# Patient Record
Sex: MALE | Race: OTHER | NOT HISPANIC OR LATINO | URBAN - METROPOLITAN AREA
[De-identification: names, ages, dates, MRNs, and addresses within clinical notes are randomized per-mention and may not be internally consistent; named-entity substitution may affect disease eponyms.]

---

## 2021-03-08 ENCOUNTER — INPATIENT (INPATIENT)
Facility: HOSPITAL | Age: 66
LOS: 8 days | Discharge: HOME | DRG: 234 | End: 2021-03-17
Attending: THORACIC SURGERY (CARDIOTHORACIC VASCULAR SURGERY) | Admitting: INTERNAL MEDICINE
Payer: COMMERCIAL

## 2021-03-08 VITALS
RESPIRATION RATE: 18 BRPM | OXYGEN SATURATION: 98 % | DIASTOLIC BLOOD PRESSURE: 83 MMHG | HEIGHT: 69 IN | SYSTOLIC BLOOD PRESSURE: 172 MMHG | TEMPERATURE: 98 F | WEIGHT: 184.97 LBS | HEART RATE: 81 BPM

## 2021-03-08 LAB
ALBUMIN SERPL ELPH-MCNC: 4.5 G/DL — SIGNIFICANT CHANGE UP (ref 3.3–5)
ALP SERPL-CCNC: 125 U/L — HIGH (ref 40–120)
ALT FLD-CCNC: 32 U/L — SIGNIFICANT CHANGE UP (ref 10–45)
ANION GAP SERPL CALC-SCNC: 12 MMOL/L — SIGNIFICANT CHANGE UP (ref 5–17)
APTT BLD: 31 SEC — SIGNIFICANT CHANGE UP (ref 27.5–35.5)
AST SERPL-CCNC: 22 U/L — SIGNIFICANT CHANGE UP (ref 10–40)
BASOPHILS # BLD AUTO: 0.06 K/UL — SIGNIFICANT CHANGE UP (ref 0–0.2)
BASOPHILS NFR BLD AUTO: 0.5 % — SIGNIFICANT CHANGE UP (ref 0–2)
BILIRUB SERPL-MCNC: 0.3 MG/DL — SIGNIFICANT CHANGE UP (ref 0.2–1.2)
BUN SERPL-MCNC: 12 MG/DL — SIGNIFICANT CHANGE UP (ref 7–23)
CALCIUM SERPL-MCNC: 10.2 MG/DL — SIGNIFICANT CHANGE UP (ref 8.4–10.5)
CHLORIDE SERPL-SCNC: 102 MMOL/L — SIGNIFICANT CHANGE UP (ref 96–108)
CO2 SERPL-SCNC: 27 MMOL/L — SIGNIFICANT CHANGE UP (ref 22–31)
CREAT SERPL-MCNC: 0.82 MG/DL — SIGNIFICANT CHANGE UP (ref 0.5–1.3)
EOSINOPHIL # BLD AUTO: 0.42 K/UL — SIGNIFICANT CHANGE UP (ref 0–0.5)
EOSINOPHIL NFR BLD AUTO: 3.5 % — SIGNIFICANT CHANGE UP (ref 0–6)
GLUCOSE SERPL-MCNC: 219 MG/DL — HIGH (ref 70–99)
HCT VFR BLD CALC: 46.8 % — SIGNIFICANT CHANGE UP (ref 39–50)
HGB BLD-MCNC: 15.1 G/DL — SIGNIFICANT CHANGE UP (ref 13–17)
IMM GRANULOCYTES NFR BLD AUTO: 0.4 % — SIGNIFICANT CHANGE UP (ref 0–1.5)
INR BLD: 0.93 RATIO — SIGNIFICANT CHANGE UP (ref 0.88–1.16)
LYMPHOCYTES # BLD AUTO: 35.4 % — SIGNIFICANT CHANGE UP (ref 13–44)
LYMPHOCYTES # BLD AUTO: 4.25 K/UL — HIGH (ref 1–3.3)
MAGNESIUM SERPL-MCNC: 2.3 MG/DL — SIGNIFICANT CHANGE UP (ref 1.6–2.6)
MCHC RBC-ENTMCNC: 27.3 PG — SIGNIFICANT CHANGE UP (ref 27–34)
MCHC RBC-ENTMCNC: 32.3 GM/DL — SIGNIFICANT CHANGE UP (ref 32–36)
MCV RBC AUTO: 84.6 FL — SIGNIFICANT CHANGE UP (ref 80–100)
MONOCYTES # BLD AUTO: 0.66 K/UL — SIGNIFICANT CHANGE UP (ref 0–0.9)
MONOCYTES NFR BLD AUTO: 5.5 % — SIGNIFICANT CHANGE UP (ref 2–14)
NEUTROPHILS # BLD AUTO: 6.55 K/UL — SIGNIFICANT CHANGE UP (ref 1.8–7.4)
NEUTROPHILS NFR BLD AUTO: 54.7 % — SIGNIFICANT CHANGE UP (ref 43–77)
NRBC # BLD: 0 /100 WBCS — SIGNIFICANT CHANGE UP (ref 0–0)
NT-PROBNP SERPL-SCNC: 150 PG/ML — SIGNIFICANT CHANGE UP (ref 0–300)
PHOSPHATE SERPL-MCNC: 3.5 MG/DL — SIGNIFICANT CHANGE UP (ref 2.5–4.5)
PLATELET # BLD AUTO: 279 K/UL — SIGNIFICANT CHANGE UP (ref 150–400)
POTASSIUM SERPL-MCNC: 4.5 MMOL/L — SIGNIFICANT CHANGE UP (ref 3.5–5.3)
POTASSIUM SERPL-SCNC: 4.5 MMOL/L — SIGNIFICANT CHANGE UP (ref 3.5–5.3)
PROT SERPL-MCNC: 7.7 G/DL — SIGNIFICANT CHANGE UP (ref 6–8.3)
PROTHROM AB SERPL-ACNC: 11.2 SEC — SIGNIFICANT CHANGE UP (ref 10.6–13.6)
RBC # BLD: 5.53 M/UL — SIGNIFICANT CHANGE UP (ref 4.2–5.8)
RBC # FLD: 13.3 % — SIGNIFICANT CHANGE UP (ref 10.3–14.5)
SODIUM SERPL-SCNC: 141 MMOL/L — SIGNIFICANT CHANGE UP (ref 135–145)
TROPONIN T, HIGH SENSITIVITY RESULT: 36 NG/L — SIGNIFICANT CHANGE UP (ref 0–51)
WBC # BLD: 11.99 K/UL — HIGH (ref 3.8–10.5)
WBC # FLD AUTO: 11.99 K/UL — HIGH (ref 3.8–10.5)

## 2021-03-08 PROCEDURE — 71045 X-RAY EXAM CHEST 1 VIEW: CPT | Mod: 26

## 2021-03-08 PROCEDURE — 99291 CRITICAL CARE FIRST HOUR: CPT

## 2021-03-08 PROCEDURE — 93010 ELECTROCARDIOGRAM REPORT: CPT

## 2021-03-08 RX ORDER — HEPARIN SODIUM 5000 [USP'U]/ML
5000 INJECTION INTRAVENOUS; SUBCUTANEOUS EVERY 6 HOURS
Refills: 0 | Status: DISCONTINUED | OUTPATIENT
Start: 2021-03-08 | End: 2021-03-09

## 2021-03-08 RX ORDER — TICAGRELOR 90 MG/1
180 TABLET ORAL ONCE
Refills: 0 | Status: COMPLETED | OUTPATIENT
Start: 2021-03-08 | End: 2021-03-08

## 2021-03-08 RX ORDER — FENTANYL CITRATE 50 UG/ML
50 INJECTION INTRAVENOUS ONCE
Refills: 0 | Status: DISCONTINUED | OUTPATIENT
Start: 2021-03-08 | End: 2021-03-08

## 2021-03-08 RX ORDER — NITROGLYCERIN 6.5 MG
0.4 CAPSULE, EXTENDED RELEASE ORAL ONCE
Refills: 0 | Status: COMPLETED | OUTPATIENT
Start: 2021-03-08 | End: 2021-03-08

## 2021-03-08 RX ORDER — HEPARIN SODIUM 5000 [USP'U]/ML
5000 INJECTION INTRAVENOUS; SUBCUTANEOUS ONCE
Refills: 0 | Status: COMPLETED | OUTPATIENT
Start: 2021-03-08 | End: 2021-03-08

## 2021-03-08 RX ORDER — ASPIRIN/CALCIUM CARB/MAGNESIUM 324 MG
243 TABLET ORAL ONCE
Refills: 0 | Status: COMPLETED | OUTPATIENT
Start: 2021-03-08 | End: 2021-03-08

## 2021-03-08 RX ORDER — HEPARIN SODIUM 5000 [USP'U]/ML
INJECTION INTRAVENOUS; SUBCUTANEOUS
Qty: 25000 | Refills: 0 | Status: DISCONTINUED | OUTPATIENT
Start: 2021-03-08 | End: 2021-03-09

## 2021-03-08 RX ADMIN — Medication 243 MILLIGRAM(S): at 22:50

## 2021-03-08 RX ADMIN — HEPARIN SODIUM 1000 UNIT(S)/HR: 5000 INJECTION INTRAVENOUS; SUBCUTANEOUS at 23:07

## 2021-03-08 RX ADMIN — HEPARIN SODIUM 5000 UNIT(S): 5000 INJECTION INTRAVENOUS; SUBCUTANEOUS at 23:07

## 2021-03-08 RX ADMIN — TICAGRELOR 180 MILLIGRAM(S): 90 TABLET ORAL at 23:07

## 2021-03-08 RX ADMIN — Medication 0.4 MILLIGRAM(S): at 22:50

## 2021-03-08 NOTE — CONSULT NOTE ADULT - SUBJECTIVE AND OBJECTIVE BOX
Patient seen and evaluated at bedside    Chief Complaint: chest pain    HPI: Patient is a 65y M w/ PMH HTN, HLD, DM, hypothyroidismm, chronic pancreatitis presenting w/ CP x 2wks. CP transient, assoc w/ exertion, described as a burning sensation, radiating to the jaw occasionally, assoc w/ occasional dyspnea. Pt went to PCP, on TTE, found w/ inferolateral hypokinesis and referred to ED. Denies f/c/n/v/d/c, dysuria, orthopnea, LE edema, palpitations, presyncope, syncope, sick contacts.    In the ED, VS T: 98, P: 81, BP: 172/83, RR: 18, O2: 98%RA. Patient currently reporting CP, denying dyspnea, palpitations, presyncope, syncope, HA, ab pain.    PMHx:   Chronic pancreatitis    Hypothyroidism    HLD (hyperlipidemia)    HTN (hypertension)    DM (diabetes mellitus)      PSHx:   No significant past surgical history      FAMILY HISTORY:    Allergies:  morphine (Urticaria)    Home Medications:    Current Medications:   heparin   Injectable 5000 Unit(s) IV Push once  heparin   Injectable 5000 Unit(s) IV Push every 6 hours PRN  heparin  Infusion.  Unit(s)/Hr IV Continuous <Continuous>  ticagrelor 180 milliGRAM(s) Oral Once    Social History  Smoking History: denies  Alcohol Use: denies  Drug Use: denies    REVIEW OF SYSTEMS:  Constitutional:     [X] negative [ ] fevers [ ] chills [ ] weight loss [ ] weight gain  HEENT:                  [X] negative [ ] dry eyes [ ] eye irritation [ ] postnasal drip [ ] nasal congestion  CV:                         [X] negative  [ ] chest pain [ ] orthopnea [ ] palpitations [ ] murmur  Resp:                     [X] negative [ ] cough [ ] shortness of breath [ ] dyspnea [ ] wheezing [ ] sputum [ ] hemoptysis  GI:                          [X] negative [ ] nausea [ ] vomiting [ ] diarrhea [ ] constipation [ ] abd pain [ ] dysphagia   :                        [X] negative [ ] dysuria [ ] nocturia [ ] hematuria [ ] increased urinary frequency  MSK:                      [X] negative [ ] back pain [ ] myalgias [ ] arthralgias [ ] fracture  Skin:                       [X] negative [ ] rash [ ] itch  Neuro:                   [X] negative [ ] headache [ ] dizziness [ ] syncope [ ] weakness [ ] numbness  Psych:                    [X] negative [ ] anxiety [ ] depression  Endo:                     [X] negative [ ] diabetes [ ] thyroid problem  Heme/Lymph:      [X] negative [ ] anemia [ ] bleeding problem  Allergic/Immune: [X] negative [ ] itchy eyes [ ] nasal discharge [ ] hives [ ] angioedema    [X] All other systems negative or otherwise described above.  [ ] Unable to assess ROS because ________.    ICU Vital Signs Last 24 Hrs  T(C): 36.4 (08 Mar 2021 21:01), Max: 36.7 (08 Mar 2021 19:11)  T(F): 97.5 (08 Mar 2021 21:01), Max: 98 (08 Mar 2021 19:11)  HR: 75 (08 Mar 2021 22:53) (75 - 81)  BP: 135/64 (08 Mar 2021 22:53) (135/64 - 172/83)  BP(mean): --  ABP: --  ABP(mean): --  RR: 17 (08 Mar 2021 22:53) (17 - 18)  SpO2: 100% (08 Mar 2021 22:53) (98% - 100%)    Daily Height in cm: 175.26 (08 Mar 2021 19:11)    Daily     Physical Exam:  GENERAL: No acute distress, well-developed  HEAD:  Atraumatic, Normocephalic  ENT: EOMI, conjunctiva and sclera clear, Neck supple, No JVD, moist mucosa  CHEST/LUNG: Clear to auscultation bilaterally; No wheeze, equal breath sounds bilaterally   BACK: No spinal tenderness  HEART: Regular rate and rhythm; No murmurs, rubs, or gallops, radial and DP 2+ b/l, euvolemic  ABDOMEN: Soft, Nontender, Nondistended  EXTREMITIES:  No clubbing, cyanosis, or edema  PSYCH: Nl behavior, nl affect  NEUROLOGY: AAOx3, non-focal  SKIN: Normal color, No rashes or lesions  LINES: no central lines present    Cardiovascular Diagnostic Testing    ECG: Personally reviewed    Echo: Personally reviewed    Stress Testing: none    Cath: none    Imaging: none    CXR: Personally reviewed    Labs: Personally reviewed                        15.1   11.99 )-----------( 279      ( 08 Mar 2021 21:31 )             46.8     03-08    141  |  102  |  12  ----------------------------<  219<H>  4.5   |  27  |  0.82    Ca    10.2      08 Mar 2021 21:31  Phos  3.5     03-08  Mg     2.3     03-08    TPro  7.7  /  Alb  4.5  /  TBili  0.3  /  DBili  x   /  AST  22  /  ALT  32  /  AlkPhos  125<H>  03-08    PT/INR - ( 08 Mar 2021 21:31 )   PT: 11.2 sec;   INR: 0.93 ratio         PTT - ( 08 Mar 2021 21:31 )  PTT:31.0 sec  Serum Pro-Brain Natriuretic Peptide: 150 pg/mL (03-08 @ 21:31)         Patient seen and evaluated at bedside    Chief Complaint: chest pain    HPI: Patient is a 65y M w/ PMH HTN, HLD, DM, hypothyroidismm, chronic pancreatitis presenting w/ CP x 2wks. CP transient, assoc w/ exertion, described as a burning sensation, radiating to the jaw occasionally, assoc w/ occasional dyspnea. Pt went to PCP, on TTE, found w/ inferolateral hypokinesis and referred to ED. Denies f/c/n/v/d/c, dysuria, orthopnea, LE edema, palpitations, presyncope, syncope, sick contacts.    In the ED, VS T: 98, P: 81, BP: 172/83, RR: 18, O2: 98%RA. Patient currently reporting CP, ab pain, denying dyspnea, palpitations, presyncope, syncope, HA.    PMHx:   Chronic pancreatitis    Hypothyroidism    HLD (hyperlipidemia)    HTN (hypertension)    DM (diabetes mellitus)      PSHx:   No significant past surgical history      FAMILY HISTORY: noncontributory    Allergies:  morphine (Urticaria)    Home Medications: unable to report    Current Medications:   heparin   Injectable 5000 Unit(s) IV Push once  heparin   Injectable 5000 Unit(s) IV Push every 6 hours PRN  heparin  Infusion.  Unit(s)/Hr IV Continuous <Continuous>  ticagrelor 180 milliGRAM(s) Oral Once    Social History  Smoking History: former  Alcohol Use: denies  Drug Use: denies    REVIEW OF SYSTEMS:  Constitutional:     [X] negative [ ] fevers [ ] chills [ ] weight loss [ ] weight gain  HEENT:                  [X] negative [ ] dry eyes [ ] eye irritation [ ] postnasal drip [ ] nasal congestion  CV:                         [ ] negative  [X ] chest pain [ ] orthopnea [ ] palpitations [ ] murmur  Resp:                     [ ] negative [ ] cough [X] shortness of breath [ ] wheezing [ ] sputum [ ] hemoptysis  GI:                          [X] negative [ ] nausea [ ] vomiting [ ] diarrhea [ ] constipation [ ] abd pain [ ] dysphagia   :                        [X] negative [ ] dysuria [ ] nocturia [ ] hematuria [ ] increased urinary frequency  MSK:                      [X] negative [ ] back pain [ ] myalgias [ ] arthralgias [ ] fracture  Skin:                       [X] negative [ ] rash [ ] itch  Neuro:                   [X] negative [ ] headache [ ] dizziness [ ] syncope [ ] weakness [ ] numbness  Psych:                    [X] negative [ ] anxiety [ ] depression  Endo:                     [X] negative [ ] diabetes [ ] thyroid problem  Heme/Lymph:      [X] negative [ ] anemia [ ] bleeding problem  Allergic/Immune: [X] negative [ ] itchy eyes [ ] nasal discharge [ ] hives [ ] angioedema    [X] All other systems negative or otherwise described above.  [ ] Unable to assess ROS because ________.    ICU Vital Signs Last 24 Hrs  T(C): 36.4 (08 Mar 2021 21:01), Max: 36.7 (08 Mar 2021 19:11)  T(F): 97.5 (08 Mar 2021 21:01), Max: 98 (08 Mar 2021 19:11)  HR: 75 (08 Mar 2021 22:53) (75 - 81)  BP: 135/64 (08 Mar 2021 22:53) (135/64 - 172/83)  BP(mean): --  ABP: --  ABP(mean): --  RR: 17 (08 Mar 2021 22:53) (17 - 18)  SpO2: 100% (08 Mar 2021 22:53) (98% - 100%)    Daily Height in cm: 175.26 (08 Mar 2021 19:11)    Daily     Physical Exam:  GENERAL: No acute distress, well-developed  HEAD:  Atraumatic, Normocephalic  ENT: EOMI, conjunctiva and sclera clear, Neck supple, No JVD, moist mucosa  CHEST/LUNG: Clear to auscultation bilaterally; No wheeze, equal breath sounds bilaterally   BACK: No spinal tenderness  HEART: Regular rate and rhythm; No murmurs, rubs, or gallops, radial and DP 2+ b/l, euvolemic  ABDOMEN: TTP, distended  EXTREMITIES:  No clubbing, cyanosis, or edema  PSYCH: Nl behavior, nl affect  NEUROLOGY: AAOx3, non-focal  SKIN: Normal color, No rashes or lesions  LINES: no central lines present    Cardiovascular Diagnostic Testing    ECG: Personally reviewed; inferior TWI, TW flattening in V5-V6    Echo: none    Stress Testing: none    Cath: none    Imaging: none    CXR: Personally reviewed    Labs: Personally reviewed                        15.1   11.99 )-----------( 279      ( 08 Mar 2021 21:31 )             46.8     03-08    141  |  102  |  12  ----------------------------<  219<H>  4.5   |  27  |  0.82    Ca    10.2      08 Mar 2021 21:31  Phos  3.5     03-08  Mg     2.3     03-08    TPro  7.7  /  Alb  4.5  /  TBili  0.3  /  DBili  x   /  AST  22  /  ALT  32  /  AlkPhos  125<H>  03-08    PT/INR - ( 08 Mar 2021 21:31 )   PT: 11.2 sec;   INR: 0.93 ratio         PTT - ( 08 Mar 2021 21:31 )  PTT:31.0 sec  Serum Pro-Brain Natriuretic Peptide: 150 pg/mL (03-08 @ 21:31)    Troponin T, High Sensitivity (03.08.21 @ 21:31)    Troponin T, High Sensitivity Result: 36: Specimen not hemolyzed  *  *  Rapid upward or downward changes in high-sensitivity troponin levels  suggest acute myocardial injury. Renal impairment may cause sustained  troponin elevations.  Normal: <6 - 14 ng/L  Indeterminate: 15-51 ng/L  Elevated: > 51 ng/L  See [email or web address]/test/[email or web address] on the [facility] intranet for more  information ng/L

## 2021-03-08 NOTE — ED PROVIDER NOTE - ST/T WAVE
TWI in III, aVF, TW flat in V5, V6 TWI in III and aVF, TW upright in V5-V6 pseudonormalization T waves laterally

## 2021-03-08 NOTE — ED PROVIDER NOTE - PROGRESS NOTE DETAILS
Candie Robbins MD: pseudonormalization of T waves in V5 to V6 in setting of persistent CP currently. Cardiology consulted. Will give asa, nitro, start heparin. Attending MD Llanos: cardiology fellow has evaluated patient, recommends DAPT loading with aspirin (given) and ticagrelor (ordered). Heparin gtt infusing. Will follow pt's response to medical therapies for now.

## 2021-03-08 NOTE — ED PROVIDER NOTE - RAPID ASSESSMENT
65y M c/o CP, SOB x 1 week that got worse today. He saw his cardiologist today and was told that he has a blockage and to go to the ED. On ASA. No Plavix or Brilinta. Denies any h/o heart disease.   Cardiologist: Gideon Marx (Scribe), have documented this rapid assessment note under the dictation of Dr. Alex Valencia, which has been reviewed and affirmed to be accurate.  Patient was seen as a tele QDOC patient. The patient will be seen and further worked up in the main emergency department and their care will be completed by the main emergency department team along with a thorough physical exam. Receiving team will follow up on labs, analgesia, any clinical imaging, reassess and disposition as clinically indicated, all decisions regarding the progression of care will be made at their discretion.    Scribe Statement: Gideon SHAIKH, attest that this documentation has been prepared under the direction and in the presence of Alex Preciado (MD) 65y M c/o CP, SOB x 1 week that got worse today. He saw his cardiologist today and was told that he has a blockage and to go to the ED. On ASA. No Plavix or Brilinta. Denies any h/o heart disease.   Cardiologist: Dr. Hitesh Rodriguez I, Gideon Gonzalez (Scribe), have documented this rapid assessment note under the dictation of Dr. Alex Valencia, which has been reviewed and affirmed to be accurate.  Patient was seen as a tele QDOC patient. The patient will be seen and further worked up in the main emergency department and their care will be completed by the main emergency department team along with a thorough physical exam. Receiving team will follow up on labs, analgesia, any clinical imaging, reassess and disposition as clinically indicated, all decisions regarding the progression of care will be made at their discretion.    Scribe Statement: I, Gideon Gonzalez, attest that this documentation has been prepared under the direction and in the presence of Alex Preciado (MD)    RENATE Valencia: I saw this patient as quick assessment doc, I did not perform a physical examination. I may have ordered labs/imaging/low acuity meds with the understanding that the patient is to be fully assessed in the main area of the emergency department by a certified emergency medicine provider. A scribe performed the documentation for me.

## 2021-03-08 NOTE — ED PROVIDER NOTE - NS ED ROS FT
General: denies fever, chills  HENT: denies nasal congestion, sore throat, rhinorrhea  Eyes: denies vision changes  CV: +chest pain  Resp: + difficulty breathing, denies cough  Abdominal: denies nausea, vomiting, diarrhea, abdominal pain, blood in stool, dark stool  : denies pain with urination  MSK: denies recent trauma  Neuro: denies headaches, numbness, tingling, dizziness, lightheadedness.  Skin: denies new rashes  Endocrine: denies recent weight loss

## 2021-03-08 NOTE — ED ADULT NURSE NOTE - OBJECTIVE STATEMENT
65 year old Male, PMH: DM,. HTN, HLD, hypothyroid, chronic pancreatitis. Patient comes in from home for x2 weeks of chest pain. Patient reports dyspnea on exertion, but more recently sob and chest pain while sitting down watching tv. Pain is described as burning radiating up to throat regardless of PO intake. Patient reports 7/10 pain upon arrival. A&Ox4, breathing spontaneous and unlabored, ambulates independently, sinus rhythm on cardiac monitor, well appearing, palpable pulses, skin color normal for ethnicity, no swelling in bilateral lower extremities. Denies sob at this time, fever, chills, nausea, vomiting, diarrhea. Bed locked and in lowest position for safety, call bell at bedside.

## 2021-03-08 NOTE — ED PROVIDER NOTE - PMH
Chronic pancreatitis    DM (diabetes mellitus)    HLD (hyperlipidemia)    HTN (hypertension)    Hypothyroidism

## 2021-03-08 NOTE — CONSULT NOTE ADULT - ASSESSMENT
Patient is a 65y M w/ PMH HTN, HLD, DM, hypothyroidismm, chronic pancreatitis presenting w/ CP x 2wks. Found w/ inferolateral hypokinesis by PCP, concern for ACS in the setting of ongoing CP.    Plan  - Admit to tele  - SLN prn for CP for now; will continue to reevaulate CP   - trend hsTrop to peak; can add on CKMB; repeat ECG   - c/w heparin gtt  - s/p ASA and brilinta load; c/w ASA 81mg qd, brilinta 90mg bid  - start atorvastatin 80mg qd, lopressor 25mg bid  - obtain lipid panel, TSH, a1c  - obtain TTE  - DASH/DM/low fat diet  - NPO for likely cath tomorrow  - ab pain present of exam; likely 2/2 chronic pancreatitis but may consider abdominal imaging. If indicated, caution w/ IV contrast as patient to likely undergo cath tomorrow    Mode Herrera MD  Cardiology Fellow - F1  Text or Call: [phone]  For all New Consults and Questions:  [email or web address]   Login: [email or web address]         Patient is a 65y M w/ PMH HTN, HLD, DM, hypothyroidismm, chronic pancreatitis presenting w/ CP x 2wks. Found w/ inferolateral hypokinesis by PCP, concern for ACS in the setting of ongoing CP.    Plan  - Admit to tele  - SLN prn for CP for now; will continue to reevaulate CP   - trend hsTrop to peak; can add on CKMB; repeat ECG   - c/w heparin gtt  - s/p ASA and brilinta load; c/w ASA 81mg qd, brilinta 90mg bid  - start atorvastatin 80mg qd, lopressor 25mg bid  - obtain lipid panel, TSH, a1c  - obtain TTE  - DASH/DM/low fat diet  - NPO for likely cath tomorrow  - ab pain present of exam; likely 2/2 chronic pancreatitis but may consider abdominal imaging. If indicated, caution w/ IV contrast as patient to likely undergo cath tomorrow  - discussed w/ interventionist; will continue to monitor CP, trops, and ECG    Mode Herrera MD  Cardiology Fellow - F1  Text or Call: [phone]  For all New Consults and Questions:  [email or web address]   Login: [email or web address]         Patient is a 65y M w/ PMH HTN, HLD, DM, hypothyroidismm, chronic pancreatitis presenting w/ CP x 2wks. Found w/ inferolateral hypokinesis by PCP, concern for ACS in the setting of ongoing CP.    Plan  - Admit to tele  - SLN prn for CP for now; will continue to reevaulate CP   - trend hsTrop to peak; can add on CKMB; repeat ECG   - c/w heparin gtt  - s/p ASA and brilinta load; c/w ASA 81mg qd, brilinta 90mg bid  - start atorvastatin 80mg qd, lopressor 25mg bid  - obtain lipid panel, TSH, a1c  - obtain TTE  - DASH/DM/low fat diet  - NPO for likely cath tomorrow  - ab pain present of exam; likely 2/2 chronic pancreatitis but may consider abdominal imaging. If indicated, caution w/ IV contrast as patient to likely undergo cath tomorrow  - discussed w/ interventionist; will continue to monitor CP, trops, and ECG  - MAIRA score: 72pts 1.6 % Probability of death from admission to 6 months     Mode Herrera MD  Cardiology Fellow - F1  Text or Call: [phone]  For all New Consults and Questions:  [email or web address]   Login: [email or web address]

## 2021-03-08 NOTE — ED PROVIDER NOTE - OBJECTIVE STATEMENT
Candie Robbins MD: Agree with rapid assessment above. Sent in by Dr. Rodriguez for planned cardiac cath tomorrow. Echo report shows inferior lateral hypokinesis, EKG with inferior lateral T wave changes. Pt states he is still having burning L sided chest pain with exertional dyspnea. Took 81mg asa today. No family hx of cardiac disease.

## 2021-03-08 NOTE — ED PROVIDER NOTE - CLINICAL SUMMARY MEDICAL DECISION MAKING FREE TEXT BOX
Attending MD Llanos:  65M with DM presenting with exertional chest burning x 2 weeks, paperwork with patient states new inferolateral TWI on outpatient ECG and TTE with inferolateral hypokinesis (presumed new). ECG from triage with no diagnostic ischemic changes but patient developed recurrent chest pain, will repeat ECG, load with aspirin, repeat ECG, admission for unstable angina    *The above represents an initial assessment/impression. Please refer to progress notes for potential changes in patient clinical course*

## 2021-03-08 NOTE — ED ADULT TRIAGE NOTE - CHIEF COMPLAINT QUOTE
as per grandmother , child was complaining of abdominal pain and has not been eating since yesterday
pt c/o "intermittent CP x 3-4 weeks"

## 2021-03-09 DIAGNOSIS — R09.89 OTHER SPECIFIED SYMPTOMS AND SIGNS INVOLVING THE CIRCULATORY AND RESPIRATORY SYSTEMS: ICD-10-CM

## 2021-03-09 DIAGNOSIS — I20.0 UNSTABLE ANGINA: ICD-10-CM

## 2021-03-09 DIAGNOSIS — E03.9 HYPOTHYROIDISM, UNSPECIFIED: ICD-10-CM

## 2021-03-09 DIAGNOSIS — E78.5 HYPERLIPIDEMIA, UNSPECIFIED: ICD-10-CM

## 2021-03-09 DIAGNOSIS — K86.1 OTHER CHRONIC PANCREATITIS: ICD-10-CM

## 2021-03-09 DIAGNOSIS — I10 ESSENTIAL (PRIMARY) HYPERTENSION: ICD-10-CM

## 2021-03-09 DIAGNOSIS — Z79.899 OTHER LONG TERM (CURRENT) DRUG THERAPY: ICD-10-CM

## 2021-03-09 DIAGNOSIS — E11.65 TYPE 2 DIABETES MELLITUS WITH HYPERGLYCEMIA: ICD-10-CM

## 2021-03-09 DIAGNOSIS — E11.9 TYPE 2 DIABETES MELLITUS WITHOUT COMPLICATIONS: ICD-10-CM

## 2021-03-09 LAB
A1C WITH ESTIMATED AVERAGE GLUCOSE RESULT: 8.2 % — HIGH (ref 4–5.6)
ANION GAP SERPL CALC-SCNC: 11 MMOL/L — SIGNIFICANT CHANGE UP (ref 5–17)
APTT BLD: 70.1 SEC — HIGH (ref 27.5–35.5)
BLD GP AB SCN SERPL QL: NEGATIVE — SIGNIFICANT CHANGE UP
BUN SERPL-MCNC: 12 MG/DL — SIGNIFICANT CHANGE UP (ref 7–23)
CALCIUM SERPL-MCNC: 9.9 MG/DL — SIGNIFICANT CHANGE UP (ref 8.4–10.5)
CHLORIDE SERPL-SCNC: 104 MMOL/L — SIGNIFICANT CHANGE UP (ref 96–108)
CHOLEST SERPL-MCNC: 111 MG/DL — SIGNIFICANT CHANGE UP
CK MB BLD-MCNC: 2 % — SIGNIFICANT CHANGE UP (ref 0–3.5)
CK MB BLD-MCNC: 2 % — SIGNIFICANT CHANGE UP (ref 0–3.5)
CK MB CFR SERPL CALC: 2.4 NG/ML — SIGNIFICANT CHANGE UP (ref 0–6.7)
CK MB CFR SERPL CALC: 2.5 NG/ML — SIGNIFICANT CHANGE UP (ref 0–6.7)
CK SERPL-CCNC: 118 U/L — SIGNIFICANT CHANGE UP (ref 30–200)
CK SERPL-CCNC: 127 U/L — SIGNIFICANT CHANGE UP (ref 30–200)
CO2 SERPL-SCNC: 28 MMOL/L — SIGNIFICANT CHANGE UP (ref 22–31)
CREAT SERPL-MCNC: 0.83 MG/DL — SIGNIFICANT CHANGE UP (ref 0.5–1.3)
ESTIMATED AVERAGE GLUCOSE: 189 MG/DL — HIGH (ref 68–114)
GLUCOSE BLDC GLUCOMTR-MCNC: 128 MG/DL — HIGH (ref 70–99)
GLUCOSE BLDC GLUCOMTR-MCNC: 154 MG/DL — HIGH (ref 70–99)
GLUCOSE BLDC GLUCOMTR-MCNC: 278 MG/DL — HIGH (ref 70–99)
GLUCOSE BLDC GLUCOMTR-MCNC: 315 MG/DL — HIGH (ref 70–99)
GLUCOSE BLDC GLUCOMTR-MCNC: 97 MG/DL — SIGNIFICANT CHANGE UP (ref 70–99)
GLUCOSE SERPL-MCNC: 78 MG/DL — SIGNIFICANT CHANGE UP (ref 70–99)
HCT VFR BLD CALC: 45.3 % — SIGNIFICANT CHANGE UP (ref 39–50)
HCV AB S/CO SERPL IA: 0.4 S/CO — SIGNIFICANT CHANGE UP (ref 0–0.99)
HCV AB SERPL-IMP: SIGNIFICANT CHANGE UP
HDLC SERPL-MCNC: 36 MG/DL — LOW
HGB BLD-MCNC: 14.5 G/DL — SIGNIFICANT CHANGE UP (ref 13–17)
LIPID PNL WITH DIRECT LDL SERPL: 55 MG/DL — SIGNIFICANT CHANGE UP
MCHC RBC-ENTMCNC: 27.1 PG — SIGNIFICANT CHANGE UP (ref 27–34)
MCHC RBC-ENTMCNC: 32 GM/DL — SIGNIFICANT CHANGE UP (ref 32–36)
MCV RBC AUTO: 84.5 FL — SIGNIFICANT CHANGE UP (ref 80–100)
NON HDL CHOLESTEROL: 75 MG/DL — SIGNIFICANT CHANGE UP
NRBC # BLD: 0 /100 WBCS — SIGNIFICANT CHANGE UP (ref 0–0)
PLATELET # BLD AUTO: 275 K/UL — SIGNIFICANT CHANGE UP (ref 150–400)
POTASSIUM SERPL-MCNC: 3.9 MMOL/L — SIGNIFICANT CHANGE UP (ref 3.5–5.3)
POTASSIUM SERPL-SCNC: 3.9 MMOL/L — SIGNIFICANT CHANGE UP (ref 3.5–5.3)
RBC # BLD: 5.36 M/UL — SIGNIFICANT CHANGE UP (ref 4.2–5.8)
RBC # FLD: 13.4 % — SIGNIFICANT CHANGE UP (ref 10.3–14.5)
RH IG SCN BLD-IMP: POSITIVE — SIGNIFICANT CHANGE UP
SARS-COV-2 IGG SERPL QL IA: NEGATIVE — SIGNIFICANT CHANGE UP
SARS-COV-2 IGM SERPL IA-ACNC: 0.07 INDEX — SIGNIFICANT CHANGE UP
SARS-COV-2 RNA SPEC QL NAA+PROBE: SIGNIFICANT CHANGE UP
SODIUM SERPL-SCNC: 143 MMOL/L — SIGNIFICANT CHANGE UP (ref 135–145)
TRIGL SERPL-MCNC: 101 MG/DL — SIGNIFICANT CHANGE UP
TROPONIN T, HIGH SENSITIVITY RESULT: 43 NG/L — SIGNIFICANT CHANGE UP (ref 0–51)
TROPONIN T, HIGH SENSITIVITY RESULT: 51 NG/L — SIGNIFICANT CHANGE UP (ref 0–51)
TSH SERPL-MCNC: 3.45 UIU/ML — SIGNIFICANT CHANGE UP (ref 0.27–4.2)
WBC # BLD: 11.3 K/UL — HIGH (ref 3.8–10.5)
WBC # FLD AUTO: 11.3 K/UL — HIGH (ref 3.8–10.5)

## 2021-03-09 PROCEDURE — 33967 INSERT I-AORT PERCUT DEVICE: CPT

## 2021-03-09 PROCEDURE — 93010 ELECTROCARDIOGRAM REPORT: CPT | Mod: 76

## 2021-03-09 PROCEDURE — 93458 L HRT ARTERY/VENTRICLE ANGIO: CPT | Mod: 26

## 2021-03-09 PROCEDURE — 71045 X-RAY EXAM CHEST 1 VIEW: CPT | Mod: 26

## 2021-03-09 PROCEDURE — 99255 IP/OBS CONSLTJ NEW/EST HI 80: CPT

## 2021-03-09 PROCEDURE — 99223 1ST HOSP IP/OBS HIGH 75: CPT

## 2021-03-09 PROCEDURE — 99152 MOD SED SAME PHYS/QHP 5/>YRS: CPT

## 2021-03-09 PROCEDURE — 99291 CRITICAL CARE FIRST HOUR: CPT

## 2021-03-09 RX ORDER — NITROGLYCERIN 6.5 MG
0.4 CAPSULE, EXTENDED RELEASE ORAL
Refills: 0 | Status: DISCONTINUED | OUTPATIENT
Start: 2021-03-09 | End: 2021-03-09

## 2021-03-09 RX ORDER — NITROGLYCERIN 6.5 MG
20 CAPSULE, EXTENDED RELEASE ORAL
Qty: 50 | Refills: 0 | Status: DISCONTINUED | OUTPATIENT
Start: 2021-03-09 | End: 2021-03-11

## 2021-03-09 RX ORDER — ASPIRIN/CALCIUM CARB/MAGNESIUM 324 MG
81 TABLET ORAL DAILY
Refills: 0 | Status: DISCONTINUED | OUTPATIENT
Start: 2021-03-09 | End: 2021-03-09

## 2021-03-09 RX ORDER — CHLORHEXIDINE GLUCONATE 213 G/1000ML
1 SOLUTION TOPICAL
Refills: 0 | Status: DISCONTINUED | OUTPATIENT
Start: 2021-03-09 | End: 2021-03-12

## 2021-03-09 RX ORDER — DEXTROSE 50 % IN WATER 50 %
25 SYRINGE (ML) INTRAVENOUS ONCE
Refills: 0 | Status: DISCONTINUED | OUTPATIENT
Start: 2021-03-09 | End: 2021-03-10

## 2021-03-09 RX ORDER — LEVOTHYROXINE SODIUM 125 MCG
2 TABLET ORAL
Qty: 0 | Refills: 0 | DISCHARGE

## 2021-03-09 RX ORDER — ACETAMINOPHEN 500 MG
1000 TABLET ORAL ONCE
Refills: 0 | Status: COMPLETED | OUTPATIENT
Start: 2021-03-09 | End: 2021-03-09

## 2021-03-09 RX ORDER — INSULIN NPH HUM/REG INSULIN HM 70-30/ML
60 VIAL (ML) SUBCUTANEOUS
Qty: 0 | Refills: 0 | DISCHARGE

## 2021-03-09 RX ORDER — DEXTROSE 50 % IN WATER 50 %
15 SYRINGE (ML) INTRAVENOUS ONCE
Refills: 0 | Status: DISCONTINUED | OUTPATIENT
Start: 2021-03-09 | End: 2021-03-10

## 2021-03-09 RX ORDER — HEPARIN SODIUM 5000 [USP'U]/ML
1000 INJECTION INTRAVENOUS; SUBCUTANEOUS
Qty: 25000 | Refills: 0 | Status: DISCONTINUED | OUTPATIENT
Start: 2021-03-09 | End: 2021-03-12

## 2021-03-09 RX ORDER — HEPARIN SODIUM 5000 [USP'U]/ML
INJECTION INTRAVENOUS; SUBCUTANEOUS
Qty: 25000 | Refills: 0 | Status: DISCONTINUED | OUTPATIENT
Start: 2021-03-09 | End: 2021-03-09

## 2021-03-09 RX ORDER — ACETAMINOPHEN 500 MG
650 TABLET ORAL ONCE
Refills: 0 | Status: DISCONTINUED | OUTPATIENT
Start: 2021-03-09 | End: 2021-03-09

## 2021-03-09 RX ORDER — DEXTROSE 50 % IN WATER 50 %
12.5 SYRINGE (ML) INTRAVENOUS ONCE
Refills: 0 | Status: DISCONTINUED | OUTPATIENT
Start: 2021-03-09 | End: 2021-03-10

## 2021-03-09 RX ORDER — ATORVASTATIN CALCIUM 80 MG/1
80 TABLET, FILM COATED ORAL AT BEDTIME
Refills: 0 | Status: DISCONTINUED | OUTPATIENT
Start: 2021-03-09 | End: 2021-03-12

## 2021-03-09 RX ORDER — LATANOPROST 0.05 MG/ML
1 SOLUTION/ DROPS OPHTHALMIC; TOPICAL AT BEDTIME
Refills: 0 | Status: DISCONTINUED | OUTPATIENT
Start: 2021-03-09 | End: 2021-03-12

## 2021-03-09 RX ORDER — LEVOTHYROXINE SODIUM 125 MCG
100 TABLET ORAL DAILY
Refills: 0 | Status: DISCONTINUED | OUTPATIENT
Start: 2021-03-09 | End: 2021-03-12

## 2021-03-09 RX ORDER — INSULIN LISPRO 100/ML
VIAL (ML) SUBCUTANEOUS
Refills: 0 | Status: DISCONTINUED | OUTPATIENT
Start: 2021-03-09 | End: 2021-03-10

## 2021-03-09 RX ORDER — GLUCAGON INJECTION, SOLUTION 0.5 MG/.1ML
1 INJECTION, SOLUTION SUBCUTANEOUS ONCE
Refills: 0 | Status: DISCONTINUED | OUTPATIENT
Start: 2021-03-09 | End: 2021-03-10

## 2021-03-09 RX ORDER — FENTANYL CITRATE 50 UG/ML
25 INJECTION INTRAVENOUS ONCE
Refills: 0 | Status: DISCONTINUED | OUTPATIENT
Start: 2021-03-09 | End: 2021-03-09

## 2021-03-09 RX ORDER — ACETAMINOPHEN WITH CODEINE 300MG-30MG
1 TABLET ORAL EVERY 4 HOURS
Refills: 0 | Status: DISCONTINUED | OUTPATIENT
Start: 2021-03-09 | End: 2021-03-09

## 2021-03-09 RX ORDER — INSULIN GLARGINE 100 [IU]/ML
40 INJECTION, SOLUTION SUBCUTANEOUS AT BEDTIME
Refills: 0 | Status: DISCONTINUED | OUTPATIENT
Start: 2021-03-09 | End: 2021-03-10

## 2021-03-09 RX ORDER — SODIUM CHLORIDE 9 MG/ML
1000 INJECTION, SOLUTION INTRAVENOUS
Refills: 0 | Status: DISCONTINUED | OUTPATIENT
Start: 2021-03-09 | End: 2021-03-10

## 2021-03-09 RX ORDER — ASPIRIN/CALCIUM CARB/MAGNESIUM 324 MG
81 TABLET ORAL DAILY
Refills: 0 | Status: DISCONTINUED | OUTPATIENT
Start: 2021-03-09 | End: 2021-03-12

## 2021-03-09 RX ORDER — LEVOTHYROXINE SODIUM 125 MCG
1 TABLET ORAL
Qty: 0 | Refills: 0 | DISCHARGE

## 2021-03-09 RX ORDER — INSULIN LISPRO 100/ML
10 VIAL (ML) SUBCUTANEOUS
Refills: 0 | Status: DISCONTINUED | OUTPATIENT
Start: 2021-03-09 | End: 2021-03-10

## 2021-03-09 RX ORDER — INSULIN LISPRO 100/ML
VIAL (ML) SUBCUTANEOUS AT BEDTIME
Refills: 0 | Status: DISCONTINUED | OUTPATIENT
Start: 2021-03-09 | End: 2021-03-10

## 2021-03-09 RX ORDER — LOSARTAN POTASSIUM 100 MG/1
100 TABLET, FILM COATED ORAL DAILY
Refills: 0 | Status: DISCONTINUED | OUTPATIENT
Start: 2021-03-09 | End: 2021-03-09

## 2021-03-09 RX ORDER — METOPROLOL TARTRATE 50 MG
25 TABLET ORAL
Refills: 0 | Status: DISCONTINUED | OUTPATIENT
Start: 2021-03-09 | End: 2021-03-10

## 2021-03-09 RX ORDER — TICAGRELOR 90 MG/1
90 TABLET ORAL EVERY 12 HOURS
Refills: 0 | Status: DISCONTINUED | OUTPATIENT
Start: 2021-03-09 | End: 2021-03-09

## 2021-03-09 RX ADMIN — Medication 25 MILLIGRAM(S): at 16:15

## 2021-03-09 RX ADMIN — Medication 400 MILLIGRAM(S): at 06:32

## 2021-03-09 RX ADMIN — INSULIN GLARGINE 40 UNIT(S): 100 INJECTION, SOLUTION SUBCUTANEOUS at 22:41

## 2021-03-09 RX ADMIN — LOSARTAN POTASSIUM 100 MILLIGRAM(S): 100 TABLET, FILM COATED ORAL at 17:18

## 2021-03-09 RX ADMIN — FENTANYL CITRATE 25 MICROGRAM(S): 50 INJECTION INTRAVENOUS at 02:22

## 2021-03-09 RX ADMIN — Medication 1 TABLET(S): at 16:14

## 2021-03-09 RX ADMIN — HEPARIN SODIUM 1000 UNIT(S)/HR: 5000 INJECTION INTRAVENOUS; SUBCUTANEOUS at 06:33

## 2021-03-09 RX ADMIN — Medication 4: at 22:43

## 2021-03-09 RX ADMIN — Medication 81 MILLIGRAM(S): at 08:04

## 2021-03-09 RX ADMIN — CHLORHEXIDINE GLUCONATE 1 APPLICATION(S): 213 SOLUTION TOPICAL at 22:44

## 2021-03-09 RX ADMIN — Medication 6: at 17:14

## 2021-03-09 RX ADMIN — ATORVASTATIN CALCIUM 80 MILLIGRAM(S): 80 TABLET, FILM COATED ORAL at 22:43

## 2021-03-09 RX ADMIN — Medication 25 MILLIGRAM(S): at 05:32

## 2021-03-09 RX ADMIN — Medication 0.4 MILLIGRAM(S): at 16:16

## 2021-03-09 RX ADMIN — HEPARIN SODIUM 1000 UNIT(S)/HR: 5000 INJECTION INTRAVENOUS; SUBCUTANEOUS at 20:01

## 2021-03-09 RX ADMIN — FENTANYL CITRATE 50 MICROGRAM(S): 50 INJECTION INTRAVENOUS at 00:03

## 2021-03-09 RX ADMIN — Medication 6 MICROGRAM(S)/MIN: at 20:01

## 2021-03-09 RX ADMIN — TICAGRELOR 90 MILLIGRAM(S): 90 TABLET ORAL at 05:32

## 2021-03-09 RX ADMIN — LATANOPROST 1 DROP(S): 0.05 SOLUTION/ DROPS OPHTHALMIC; TOPICAL at 22:45

## 2021-03-09 RX ADMIN — Medication 0.4 MILLIGRAM(S): at 05:32

## 2021-03-09 RX ADMIN — Medication 30 MILLILITER(S): at 00:03

## 2021-03-09 NOTE — H&P ADULT - PROBLEM SELECTOR PLAN 2
resume home medications   monitor routinely clarify home medications in AM  start lopressor 25mg BID for now   monitor routinely

## 2021-03-09 NOTE — H&P ADULT - PROBLEM SELECTOR PROBLEM 2
Medication is being filled for 1 time refill only due to:  Patient needs to be seen because needs follow up and phq-9.   Je Lagunas RN       Essential hypertension

## 2021-03-09 NOTE — H&P ADULT - NSICDXPASTMEDICALHX_GEN_ALL_CORE_FT
PAST MEDICAL HISTORY:  Chronic pancreatitis     DM (diabetes mellitus)     HLD (hyperlipidemia)     HTN (hypertension)     Hypothyroidism

## 2021-03-09 NOTE — H&P ADULT - NSHPPHYSICALEXAM_GEN_ALL_CORE
Vital Signs Last 24 Hrs  T(C): 36.4 (08 Mar 2021 21:01), Max: 36.7 (08 Mar 2021 19:11)  T(F): 97.5 (08 Mar 2021 21:01), Max: 98 (08 Mar 2021 19:11)  HR: 75 (08 Mar 2021 22:53) (75 - 81)  BP: 135/64 (08 Mar 2021 22:53) (135/64 - 172/83)  BP(mean): --  RR: 17 (08 Mar 2021 22:53) (17 - 18)  SpO2: 100% (08 Mar 2021 22:53) (98% - 100%)    PHYSICAL EXAM:  GENERAL: NAD, well-developed  HEAD:  Atraumatic, normocephalic  EYES: EOMI, PERRLA, conjunctiva and sclera clear  NECK: Supple, no JVD  CHEST/LUNG: Clear to auscultation bilaterally; no wheezing or rales  HEART: Regular rate and rhythm; no murmurs  ABDOMEN: Soft, nontender, nondistended; bowel sounds present  EXTREMITIES:  2+ Peripheral Pulses, no clubbing, cyanosis, or edema  PSYCH: calm affect, not anxious  NEUROLOGY: non-focal, AAOx3  SKIN: No rashes or lesions  MUSCULOSKELETAL: no back pain, moving all extremities Vital Signs Last 24 Hrs  T(C): 36.4 (08 Mar 2021 21:01), Max: 36.7 (08 Mar 2021 19:11)  T(F): 97.5 (08 Mar 2021 21:01), Max: 98 (08 Mar 2021 19:11)  HR: 75 (08 Mar 2021 22:53) (75 - 81)  BP: 135/64 (08 Mar 2021 22:53) (135/64 - 172/83)  BP(mean): --  RR: 17 (08 Mar 2021 22:53) (17 - 18)  SpO2: 100% (08 Mar 2021 22:53) (98% - 100%)    PHYSICAL EXAM:  GENERAL: NAD, well-developed  HEAD:  Atraumatic, normocephalic  EYES: EOMI, PERRLA, conjunctiva and sclera clear  NECK: Supple, no JVD  CHEST/LUNG: Clear to auscultation bilaterally; no wheezing or rales  HEART: Regular rate and rhythm; no murmurs  ABDOMEN: Soft, +epigastric tenderness, +midline surgical scar, nondistended; bowel sounds present  EXTREMITIES:  2+ Peripheral Pulses, no edema  PSYCH: calm affect, not anxious  NEUROLOGY: non-focal, AAOx3  SKIN: No rashes or lesions  MUSCULOSKELETAL: no back pain, moving all extremities

## 2021-03-09 NOTE — CONSULT NOTE ADULT - SUBJECTIVE AND OBJECTIVE BOX
CHIEF COMPLAINT: cp    HISTORY OF PRESENT ILLNESS:  65M with PMH of HTN, T2DM, HLD, hypothyroidism, chronic pancreatitis p/w chest pain. Pt states he has been having CP for past 2 weeks - pain was initially only with exertion, located midsternal/epigastric region, burning pain with occasional radiation to the jaw and associated with DUARTE. Pain would improve after about 5 minutes of rest. However, in past few days pt has started having pain even at rest. Denies any associated nausea/vomiting, diaphoresis, palpitations, syncope/lightheadedness. Pt went to see cardiologist for these symptoms and TTE showed inferolateral wall hypokinesis and referred to ED for possible cath. Pt had a normal stress test 2 years ago for pore-op for RLE arthrocentesis. ROS positive for chronic abd pain 2/2 chronic pancreatitis. Prior smoker, quit 1992.     Allergies    morphine (Urticaria)    Intolerances    	    MEDICATIONS:  aspirin  chewable 81 milliGRAM(s) Oral daily  heparin   Injectable 5000 Unit(s) IV Push every 6 hours PRN  heparin  Infusion.  Unit(s)/Hr IV Continuous <Continuous>  metoprolol tartrate 25 milliGRAM(s) Oral two times a day  nitroglycerin     SubLingual 0.4 milliGRAM(s) SubLingual every 5 minutes PRN  ticagrelor 90 milliGRAM(s) Oral every 12 hours  acetaminophen 300 mG/codeine 30 mG 1 Tablet(s) Oral every 4 hours PRN  atorvastatin 80 milliGRAM(s) Oral at bedtime  dextrose 40% Gel 15 Gram(s) Oral once  dextrose 50% Injectable 25 Gram(s) IV Push once  dextrose 50% Injectable 12.5 Gram(s) IV Push once  dextrose 50% Injectable 25 Gram(s) IV Push once  glucagon  Injectable 1 milliGRAM(s) IntraMuscular once  insulin glargine Injectable (LANTUS) 40 Unit(s) SubCutaneous at bedtime  insulin lispro (ADMELOG) corrective regimen sliding scale   SubCutaneous three times a day before meals  insulin lispro (ADMELOG) corrective regimen sliding scale   SubCutaneous at bedtime    dextrose 5%. 1000 milliLiter(s) IV Continuous <Continuous>  dextrose 5%. 1000 milliLiter(s) IV Continuous <Continuous>      PAST MEDICAL & SURGICAL HISTORY:  Chronic pancreatitis    Hypothyroidism    HLD (hyperlipidemia)    HTN (hypertension)    DM (diabetes mellitus)    No significant past surgical history        FAMILY HISTORY:  No pertinent family history in first degree relatives        SOCIAL HISTORY:    former smoker. quit 1992      REVIEW OF SYSTEMS:  See HPI, otherwise complete 10 point review of systems negative    [ ] All others negative	      PHYSICAL EXAM:  T(C): 36.6 (03-09-21 @ 05:35), Max: 36.7 (03-08-21 @ 19:11)  HR: 60 (03-09-21 @ 08:05) (60 - 81)  BP: 135/70 (03-09-21 @ 08:05) (135/64 - 172/83)  RR: 18 (03-09-21 @ 08:05) (17 - 20)  SpO2: 98% (03-09-21 @ 08:05) (97% - 100%)  Wt(kg): --  I&O's Summary      Appearance: No Acute Distress	  HEENT:  Normal oral mucosa, PERRL, EOMI	  Cardiovascular: Normal S1 S2, No JVD, No murmurs/rubs/gallops  Respiratory: Lungs clear to auscultation bilaterally  Gastrointestinal:  Soft, Non-tender, + BS	  Skin: No rashes, No ecchymoses, No cyanosis	  Neurologic: Non-focal  Extremities: No clubbing, cyanosis or edema  Vascular: Peripheral pulses palpable 2+ bilaterally  Psychiatry: A & O x 3, Mood & affect appropriate    Laboratory Data:	 	    CBC Full  -  ( 09 Mar 2021 05:18 )  WBC Count : 11.30 K/uL  Hemoglobin : 14.5 g/dL  Hematocrit : 45.3 %  Platelet Count - Automated : 275 K/uL  Mean Cell Volume : 84.5 fl  Mean Cell Hemoglobin : 27.1 pg  Mean Cell Hemoglobin Concentration : 32.0 gm/dL  Auto Neutrophil # : x  Auto Lymphocyte # : x  Auto Monocyte # : x  Auto Eosinophil # : x  Auto Basophil # : x  Auto Neutrophil % : x  Auto Lymphocyte % : x  Auto Monocyte % : x  Auto Eosinophil % : x  Auto Basophil % : x    03-09    143  |  104  |  12  ----------------------------<  78  3.9   |  28  |  0.83  03-08    141  |  102  |  12  ----------------------------<  219<H>  4.5   |  27  |  0.82    Ca    9.9      09 Mar 2021 05:15  Ca    10.2      08 Mar 2021 21:31  Phos  3.5     03-08  Mg     2.3     03-08    TPro  7.7  /  Alb  4.5  /  TBili  0.3  /  DBili  x   /  AST  22  /  ALT  32  /  AlkPhos  125<H>  03-08      proBNP: Serum Pro-Brain Natriuretic Peptide: 150 pg/mL (03-08 @ 21:31)        Interpretation of Telemetry: nsr 	    ECG:  	nsr inferolateral twi      Assessment:  -unstable angina  -htn  -hld  -dm  -chronic pancreatitis    Recs:  remains on asa, brilinta, statin and hep gtt for unstable angina. awaiting LHC with dr villagomez today. c/w sl nt prn. no role for gp2b3a inhibitor at present  beta blockers as tolerates  trend cardiac enzymes. serial ecgs  tele monitoring          Greater than 60 minutes spent on total encounter; more than 50% of the visit was spent counseling and/or coordinating care by the attending physician.   	  Alexei Fraga MD   Cardiovascular Diseases  [phone]

## 2021-03-09 NOTE — CHART NOTE - NSCHARTNOTEFT_GEN_A_CORE
Reference #: [id number]    Others' Prescriptions  Patient Name: Guillermo Camargo Date: 1955  Address: [address] [place] [id number]: Male  Rx Written	Rx Dispensed	Drug	Quantity	Days Supply	Prescriber Name	Payment Method	Dispenser  12/11/2020	12/14/2020	acetaminophen-cod #3 tablet	150	25	RakelAlex MD	Insurance	[facility] [facility]  11/13/2020	11/16/2020	acetaminophen-cod #3 tablet	150	25	RakelAlex MD	Insurance	[employer] [facility]  10/14/2020	10/16/2020	acetaminophen-cod #3 tablet	150	25	RakelAlex MD	Insurance	[employer] [facility]  09/11/2020	09/14/2020	acetaminophen-cod #3 tablet	150	25	RakelAlex MD	Insurance	[employer] [facility]  08/10/2020	08/11/2020	acetaminophen-cod #3 tablet	150	25	RakelAlex MD	Insurance	[employer] [facility]  07/10/2020	07/13/2020	acetaminophen-cod #3 tablet	150	25	RakelAlex MD	Insurance	[employer] [facility]  06/10/2020	06/11/2020	acetaminophen-cod #3 tablet	150	25	RakelAlex MD	Insurance	[employer] [facility]

## 2021-03-09 NOTE — CHART NOTE - NSCHARTNOTEFT_GEN_A_CORE
Admitted overnight by hospitalist service   chart reviewed/ patient examined  Cath pending   Sriram Flores MD pager [phone]

## 2021-03-09 NOTE — CHART NOTE - NSCHARTNOTEFT_GEN_A_CORE
CC: Chest pain    HPI: Called by RN to evaluate patient for . Patient seen and examined at the bedside. Denies fevers/chills, weakness, diaphoresis, headaches, dizziness, syncope, paresthesias, chest pain, palpitations, dyspnea, abdominal pain, N/V/D.     Vital Signs Last 24 Hrs  T(C): 36.4 (09 Mar 2021 02:30), Max: 36.7 (08 Mar 2021 19:11)  T(F): 97.6 (09 Mar 2021 02:30), Max: 98 (08 Mar 2021 19:11)  HR: 75 (09 Mar 2021 04:20) (72 - 81)  BP: 152/85 (09 Mar 2021 04:20) (135/64 - 172/83)  BP(mean): --  RR: 18 (09 Mar 2021 04:20) (17 - 18)  SpO2: 97% (09 Mar 2021 04:20) (97% - 100%)    PHYSICAL EXAM:  General: NAD, A&Ox3  Respiratory: Lungs clear to auscultation bilaterally. No wheezes, rales, rhonchi. Normal respiratory effort.   Cardiovascular: S1, S2 present. Regular rate and rhythm. No murmurs, rubs, or gallops  Gastrointestinal: BS x4 normoactive. Soft, non-tender, non-distended.   Extremities: 2+ peripheral pulses. Warm to touch. No edema, cyanosis.    A/P  HPI:  65M with PMH of HTN, T2DM, HLD, hypothyroidism, chronic pancreatitis p/w chest pain. Pt states he has been having CP for past 2 weeks - pain was initially only with exertion, located midsternal/epigastric region, burning pain with occasional radiation to the jaw and associated with DUARTE. Pain would improve after about 5 minutes of rest. However, in past few days pt has started having pain even at rest. Denies any associated nausea/vomiting, diaphoresis, palpitations, syncope/lightheadedness. Pt went to see cardiologist for these symptoms and TTE showed inferolateral wall hypokinesis and referred to ED for possible cath. Pt had a normal stress test 2 years ago for pore-op for RLE arthrocentesis. ROS positive for chronic abd pain 2/2 chronic pancreatitis. Prior smoker, quit 1992.     Pt does not know home medications.  (09 Mar 2021 02:17)      #  -Repeat vitals were stable  -Discussed with RN  -Will continue to monitor  -Will endorse to AM team      Cheri Parker PA-C  # CC: Chest pain    HPI: Called by RN to evaluate patient for chest pain. Cardiology is following, and is planning for cath in AM  Patient seen and examined at the bedside. Patient reports chest pain is similar to what he originally presented with. Chest pain is described as a sharp 8/10 pain that radiates to the left arm and abdomen. Denies weakness, diaphoresis, headaches, dizziness, syncope, visual changes, paresthesias, palpitations, dyspnea, abdominal pain, N/V/D.     Vital Signs Last 24 Hrs  T(C): 36.4 (09 Mar 2021 02:30), Max: 36.7 (08 Mar 2021 19:11)  T(F): 97.6 (09 Mar 2021 02:30), Max: 98 (08 Mar 2021 19:11)  HR: 75 (09 Mar 2021 04:20) (72 - 81)  BP: 152/85 (09 Mar 2021 04:20) (135/64 - 172/83)  RR: 18 (09 Mar 2021 04:20) (17 - 18)  SpO2: 97% (09 Mar 2021 04:20) (97% - 100%)    PHYSICAL EXAM:  General: NAD, A&Ox3  Respiratory: Lungs clear to auscultation bilaterally. No wheezes, rales, rhonchi. Normal respiratory effort.   Cardiovascular: S1, S2 present. Regular rate and rhythm. No murmurs, rubs, or gallops  Gastrointestinal: BS x4 normoactive. Distended. Soft, non-tender  Extremities: 2+ peripheral pulses. Warm to touch. No edema, cyanosis.    LABS:  Troponin T, High Sensitivity (03.09.21 @ 02:12)    Troponin T, High Sensitivity Result: 43: Specimen not hemolyzed  *  *  Rapid upward or downward changes in high-sensitivity troponin levels  suggest acute myocardial injury. Renal impairment may cause sustained  troponin elevations.  Normal: <6 - 14 ng/L  Indeterminate: 15-51 ng/L  Elevated: > 51 ng/L  See http://labs/test/[email or web address] on the [facility] intranet for more  information ng/L    Troponin T, High Sensitivity (03.08.21 @ 21:31)    Troponin T, High Sensitivity Result: 36: Specimen not hemolyzed  *  *  Rapid upward or downward changes in high-sensitivity troponin levels  suggest acute myocardial injury. Renal impairment may cause sustained  troponin elevations.  Normal: <6 - 14 ng/L  Indeterminate: 15-51 ng/L  Elevated: > 51 ng/L  See http://labs/test/[email or web address] on the [facility] intranet for more  information ng/L    CARDIAC MARKERS ( 09 Mar 2021 02:12 )  x     / x     / 127 U/L / x     / 2.5 ng/mL      A/P  65M with PMH of HTN, T2DM, HLD, hypothyroidism, chronic pancreatitis p/w chest pain x 2 weeks, found with inferolateral wall hypokinesis with inferior TWI on EKG as outpt, admitted for c/f ACS.   Now, patient with chest pain, similar to what he originally presented with. Chest pain is described as a sharp 8/10 pain that radiates to the left arm and abdomen. Denies weakness, diaphoresis, headaches, dizziness, syncope, visual changes, paresthesias, palpitations, dyspnea, abdominal pain, N/V/D.  Cardiology is following, and is planning for cath in AM    #Chest pain, likely ACS  -Repeat vitals were stable  -STAT EKG with NSR (HR: 65) and known TWI in II-III  -F/u STAT cardiac enzymes  -PRN SL nitro given  -Cath in AM  -Discussed with RN  -Will continue to monitor  -Will endorse to AM team      Cheri Parker PA-C  #[id number]        **ADDENDUM @ 06:07**  Troponin T, High Sensitivity (03.09.21 @ 05:15)    Troponin T, High Sensitivity Result: 51: Specimen not hemolyzed  *  *  Rapid upward or downward changes in high-sensitivity troponin levels  suggest acute myocardial injury. Renal impairment may cause sustained  troponin elevations.  Normal: <6 - 14 ng/L  Indeterminate: 15-51 ng/L  Elevated: > 51 ng/L  See http://labs/test/[email or web address] on the [facility] intranet for more  information ng/L    CARDIAC MARKERS ( 09 Mar 2021 05:15 )  x     / x     / 118 U/L / x     / 2.4 ng/mL  CARDIAC MARKERS ( 09 Mar 2021 02:12 )  x     / x     / 127 U/L / x     / 2.5 ng/mL      -Up-trending trops (36 -> 43 -> 51)  -Chest pain still present despite SL nitro -> IV tylenol 1gm x 1 STAT given  -Cath in AM  -Discussed with RN  -Will continue to monitor  -Will endorse to AM team      Cheri Parker PA-C  #[id number]

## 2021-03-09 NOTE — H&P ADULT - ASSESSMENT
65M with PMH of HTN, T2DM, HLD, hypothyroidism, chronic pancreatitis p/w chest pain x 2 weeks, found with inferolateral wall hypokinesis with inferior TWI on EKG as outpt, admitted for c/f ACS.

## 2021-03-09 NOTE — CONSULT NOTE ADULT - ASSESSMENT
65M with PMH of HTN, T2DM, HLD, hypothyroidism, chronic pancreatitis p/w chest pain. S/p LHC revealing oLAD 90%, mLAD 60%, OM1 70%, OM2 90%, mRCA 95%, LV gram 45%, EDP 14. s/p Brillinta load 3/8/21. CT surgery consulted for CABG eval. Pt with unstable angina, pending IABP and tx to CCU.     - hep gtt  - asa , statin, betablockers as tolerated  - hold brillinta in anticipation of cardiac surgery  - check p2y12 in AM   - VA carotid dopplers   - TTE  - OR date TBD

## 2021-03-09 NOTE — PROVIDER CONTACT NOTE (OTHER) - BACKGROUND
Pt with HX: HTN, T2DM, HLD, hypothyroidism, chronic pancreatitis p/w chest pain.
s/p diagnostic cath 3/9 +TVD

## 2021-03-09 NOTE — H&P ADULT - PROBLEM SELECTOR PROBLEM 3
DM (diabetes mellitus) Type 2 diabetes mellitus with hyperglycemia, with long-term current use of insulin

## 2021-03-09 NOTE — PROVIDER CONTACT NOTE (OTHER) - ACTION/TREATMENT ORDERED:
tylenol #3 PO, Nitro as ordered x 2 with some relief, EKG done stat, NP to clarify Heparin gtt. will cont to monitor tylenol #3 PO, Nitro as ordered x 1 with some relief, EKG done stat, NP to clarify Heparin gtt. will cont to monitor

## 2021-03-09 NOTE — H&P ADULT - PROBLEM SELECTOR PLAN 1
CP x 2 weeks, inferolateral wall hypokinesis with inferior TWI on EKG. High risk given hx of HTN, T2DM, HLD.   - s/p ASA and Brilinta load, started on heparin gtt   - hs trop 36; will repeat trop/CK/CKMB, monitor on tele   - c/w ASA 81mg and Brilinta 90mg BID  - start on high dose lipitor and lopressor 25mg BID   - check TSH, lipid, A1c  - check TTE  - monitor on tele   - possible cath in AM    - SLN for CP as needed CP x 2 weeks, inferolateral wall hypokinesis with inferior TWI on EKG. High risk given hx of HTN, T2DM, HLD, former smoker.    - s/p ASA and Brilinta load, started on heparin gtt   - hs trop 36; will repeat trop/CK/CKMB, monitor on tele   - c/w ASA 81mg and Brilinta 90mg BID, c/w hep gtt   - start on high dose lipitor and lopressor 25mg BID   - check TSH, lipid, A1c  - check TTE  - monitor on tele   - possible cath in AM    - SLN for CP as needed

## 2021-03-09 NOTE — H&P ADULT - NSHPREVIEWOFSYSTEMS_GEN_ALL_CORE
REVIEW OF SYSTEMS:    CONSTITUTIONAL: No fevers, chills  EYES/ENT: No visual changes;  no throat pain   NECK: No pain or stiffness  RESPIRATORY: No cough, + occ shortness of breath  CARDIOVASCULAR: +chest pain, no palpitations  GASTROINTESTINAL: no nausea, vomiting, no abdominal pain, no BRBPR  GENITOURINARY: no polyuria, no dysuria  NEUROLOGICAL: no numbness, no headaches, no confusion   MUSCULOSKELETAL: no back pain, no weakness   SKIN: No itching, burning, rashes, or lesions   PSYCH: no anxiety, depression  HEME: no gum bleeding, no bruising REVIEW OF SYSTEMS:    CONSTITUTIONAL: No fevers, chills  EYES/ENT: No visual changes;  no throat pain   NECK: No pain or stiffness  RESPIRATORY: No cough, + occ shortness of breath  CARDIOVASCULAR: +chest pain, no palpitations  GASTROINTESTINAL: no nausea, vomiting, +chronic abdominal pain, no BRBPR  GENITOURINARY: no polyuria, no dysuria  NEUROLOGICAL: no numbness, no headaches, no confusion   MUSCULOSKELETAL: no back pain, no weakness   SKIN: No itching, burning, rashes, or lesions   PSYCH: no anxiety, depression  HEME: no gum bleeding, no bruising

## 2021-03-09 NOTE — PROVIDER CONTACT NOTE (OTHER) - SITUATION
pt. c/o of chest pain level 7 and rt groin pain
pt. with continued CP, NP SHAILA Hoskins from CTS at bedside
Pt c/o CP 8/10

## 2021-03-09 NOTE — H&P ADULT - NSHPLABSRESULTS_GEN_ALL_CORE
Labs, imaging and EKG personally reviewed and interpreted by me.                           15.1   11.99 )-----------( 279      ( 08 Mar 2021 21:31 )             46.8     03-08    141  |  102  |  12  ----------------------------<  219<H>  4.5   |  27  |  0.82    Ca    10.2      08 Mar 2021 21:31  Phos  3.5     03-08  Mg     2.3     03-08    TPro  7.7  /  Alb  4.5  /  TBili  0.3  /  DBili  x   /  AST  22  /  ALT  32  /  AlkPhos  125<H>  03-08        PT/INR - ( 08 Mar 2021 21:31 )   PT: 11.2 sec;   INR: 0.93 ratio         PTT - ( 08 Mar 2021 21:31 )  PTT:31.0 sec          < from: Xray Chest 1 View- PORTABLE-Urgent (Xray Chest 1 View- PORTABLE-Urgent .) (03.08.21 @ 22:54) >  ******PRELIMINARY REPORT******    ******PRELIMINARY REPORT******          INTERPRETATION:  Clear lungs    < end of copied text > Labs, imaging and EKG personally reviewed and interpreted by me. EKG sinus, TWI in inferior leads, no HANSEL.                           15.1   11.99 )-----------( 279      ( 08 Mar 2021 21:31 )             46.8     03-08    141  |  102  |  12  ----------------------------<  219<H>  4.5   |  27  |  0.82    Ca    10.2      08 Mar 2021 21:31  Phos  3.5     03-08  Mg     2.3     03-08    TPro  7.7  /  Alb  4.5  /  TBili  0.3  /  DBili  x   /  AST  22  /  ALT  32  /  AlkPhos  125<H>  03-08        PT/INR - ( 08 Mar 2021 21:31 )   PT: 11.2 sec;   INR: 0.93 ratio         PTT - ( 08 Mar 2021 21:31 )  PTT:31.0 sec          < from: Xray Chest 1 View- PORTABLE-Urgent (Xray Chest 1 View- PORTABLE-Urgent .) (03.08.21 @ 22:54) >  ******PRELIMINARY REPORT******    ******PRELIMINARY REPORT******          INTERPRETATION:  Clear lungs    < end of copied text >

## 2021-03-09 NOTE — H&P ADULT - HISTORY OF PRESENT ILLNESS
65M with PMH of HTN, T2DM, HLD, hypothyroidism, chronic pancreatitis p/w chest pain. Pt states he has been having CP for past 2 weeks, midsternal, burning pain, with occasional radiation to the jaw, occasionally associated with DUARTE. Pt went to his cardiologist for these symptoms and TTE showed inferolateral wall hypokinesis and referred to ED for possible cath.  65M with PMH of HTN, T2DM, HLD, hypothyroidism, chronic pancreatitis p/w chest pain. Pt states he has been having CP for past 2 weeks - pain was initially only with exertion, located midsternal/epigastric region, burning pain with occasional radiation to the jaw and associated with DUARTE. Pain would improve after about 5 minutes of rest. However, in past few days pt has started having pain even at rest. Denies any associated nausea/vomiting, diaphoresis, palpitations, syncope/lightheadedness. Pt went to see cardiologist for these symptoms and TTE showed inferolateral wall hypokinesis and referred to ED for possible cath. Pt had a normal stress test 2 years ago for pore-op for RLE arthrocentesis. ROS positive for chronic abd pain 2/2 chronic pancreatitis. Prior smoker, quit 1992.     Pt does not know home medications.

## 2021-03-09 NOTE — PROVIDER CONTACT NOTE (OTHER) - ASSESSMENT
Pt denies SOB, BP: 152/85, HR: 75, RR: 20, O2 sat: 97% on RA.
/79 ST tele 
pt. A+O x 4 V/S [facility] -

## 2021-03-09 NOTE — CONSULT NOTE ADULT - SUBJECTIVE AND OBJECTIVE BOX
Consult NOTE  CTS     History of Present Illness:  per HPI:  65M with PMH of HTN, T2DM, HLD, hypothyroidism, chronic pancreatitis p/w chest pain. Pt states he has been having CP for past 2 weeks - pain was initially only with exertion, located midsternal/epigastric region, burning pain with occasional radiation to the jaw and associated with DUARTE. Pain would improve after about 5 minutes of rest. However, in past few days pt has started having pain even at rest. Denies any associated nausea/vomiting, diaphoresis, palpitations, syncope/lightheadedness. Pt went to see cardiologist for these symptoms and TTE showed inferolateral wall hypokinesis and referred to ED for possible cath. Pt had a normal stress test 2 years ago for pore-op for RLE arthrocentesis. ROS positive for chronic abd pain 2/2 chronic pancreatitis. Prior smoker, quit 1992.     Pt does not know home medications.  (09 Mar 2021 02:17)    CT Surgery consulted for chest pain and cabg eval     Past Medical History  Chronic pancreatitis    Hypothyroidism    HLD (hyperlipidemia)    HTN (hypertension)    DM (diabetes mellitus)        Past Surgical History  No significant past surgical history        MEDICATIONS  (STANDING):  aspirin  chewable 81 milliGRAM(s) Oral daily  atorvastatin 80 milliGRAM(s) Oral at bedtime  chlorhexidine 2% Cloths 1 Application(s) Topical <User Schedule>  dextrose 40% Gel 15 Gram(s) Oral once  dextrose 5%. 1000 milliLiter(s) (50 mL/Hr) IV Continuous <Continuous>  dextrose 5%. 1000 milliLiter(s) (100 mL/Hr) IV Continuous <Continuous>  dextrose 50% Injectable 25 Gram(s) IV Push once  dextrose 50% Injectable 12.5 Gram(s) IV Push once  dextrose 50% Injectable 25 Gram(s) IV Push once  glucagon  Injectable 1 milliGRAM(s) IntraMuscular once  heparin  Infusion. 1000 Unit(s)/Hr (10 mL/Hr) IV Continuous <Continuous>  insulin glargine Injectable (LANTUS) 40 Unit(s) SubCutaneous at bedtime  insulin lispro (ADMELOG) corrective regimen sliding scale   SubCutaneous three times a day before meals  insulin lispro (ADMELOG) corrective regimen sliding scale   SubCutaneous at bedtime  latanoprost 0.005% Ophthalmic Solution 1 Drop(s) Both EYES at bedtime  levothyroxine 100 MICROGram(s) Oral daily  losartan 100 milliGRAM(s) Oral daily  metoprolol tartrate 25 milliGRAM(s) Oral two times a day    MEDICATIONS  (PRN):  acetaminophen 300 mG/codeine 30 mG 1 Tablet(s) Oral every 4 hours PRN moderate and severe pain      Vital Signs Last 24 Hrs  T(C): 36.8 (03-09-21 @ 16:02), Max: 36.8 (03-09-21 @ 16:02)  T(F): 98.3 (03-09-21 @ 16:02), Max: 98.3 (03-09-21 @ 16:02)  HR: 93 (03-09-21 @ 16:54) (58 - 100)  BP: 148/72 (03-09-21 @ 16:54) (126/79 - 194/79)  RR: 16 (03-09-21 @ 16:02) (15 - 20)  SpO2: 98% (03-09-21 @ 16:02) (94% - 100%)           Daily Height in cm: 175.26 (09 Mar 2021 08:27)    Daily   Admit Wt: Drug Dosing Weight  Height (cm): 175.3 (09 Mar 2021 08:27)  Weight (kg): 88.5 (09 Mar 2021 08:27)  BMI (kg/m2): 28.8 (09 Mar 2021 08:27)  BSA (m2): 2.04 (09 Mar 2021 08:27)    Allergies: morphine (Urticaria)      SOCIAL HISTORY:  Smoker: [ ] Yes  [ x] No        PACK YEARS:                         WHEN QUIT? quit 1992   ETOH use: [ ] Yes  [ x] No              FREQUENCY / QUANTITY:  Ilicit Drug use:  [ ] Yes  [x ] No      Relevant Family History  FAMILY HISTORY:  No pertinent family history in first degree relatives        Review of Systems  GENERAL:  no weakness, fatigue, fevers or chills  NEURO: no dizziness, numbness, tingling or weakness  SKIN: no itching, burning, rashes, or lesions   HEENT: no visual changes;  no headache, no vertigo, no recent colds  RESPIRATORY: no shortness of breath, no cough, sputum, wheezing, hemoptysis  CARDIOVASCULAR:  +chest pain   GI: +chronic abd pain   PERIPHERAL VASCULAR: no swelling, no tenderness, no erythema, no varicose veins.     PHYSICAL EXAM  General: Well nourished, well developed, NAD.                                              Neuro: Normal exam oriented to person/place & time with no focal motor or sensory  deficits                   Chest: Normal lung exam with good air movement absence of wheezes, rales, or rhonchi                                                                       CV:  Auscultation: normal S1S2, regular  NO  Murmurs                                                                     GI: Normal exam of abdomen with no noted masses or tenderness. +[id number]                                                                                            Extremities: Normal no evidence of cyanosis or deformity, Edema: none  Lower Extremity Pulses: Right[x ] Left[x ]  SKIN : Normal exam to inspection & palpation                                                          LABS:                        14.5   11.30 )-----------( 275      ( 09 Mar 2021 05:18 )             45.3     03-09    143  |  104  |  12  ----------------------------<  78  3.9   |  28  |  0.83    Ca    9.9      09 Mar 2021 05:15  Phos  3.5     03-08  Mg     2.3     03-08    TPro  7.7  /  Alb  4.5  /  TBili  0.3  /  DBili  x   /  AST  22  /  ALT  32  /  AlkPhos  125<H>  03-08    PT/INR - ( 08 Mar 2021 21:31 )   PT: 11.2 sec;   INR: 0.93 ratio         PTT - ( 09 Mar 2021 05:15 )  PTT:70.1 sec      Cardiac Cath:  < from: Cardiac Cath Lab - Adult (03.09.21 @ 11:22) >  VENTRICLES: Analysis of regional contractile function demonstrated mild  diaphragmatic hypokinesis and severe posterobasal hypokinesis. EF  estimated was 45 %.  VALVES: MITRAL VALVE: The mitral valve exhibited no regurgitation.  CORONARY VESSELS: The coronary circulation is right dominant.  LM:   --  LM: Angiography showed minor luminal irregularities with no flow  limiting lesions.  LAD:   --  Ostial LAD: There was a 90 % stenosis. e ccentric  --  Mid LAD: There was a 60 % stenosis.  CX:   --  OM1: There was a 70 % stenosis.  --  OM2: There was a 90 % stenosis in the proximal third of the vessel  segment.  RCA: --  Mid RCA: There was a tubular 95 % stenosis. The lesion was  eccentric and moderately calcified. Tortuous  COMPLICATIONS: There were no complications.  DIAGNOSTIC IMPRESSIONS: Patient has inferobasal hypokinesia with severe mid  RCA disease and severe ostial LAD disease and severe disease of om and om  DIAGNOSTIC RECOMMENDATIONS: CT surgical consultation re: urgent CABG  Prepared and signed by  Butch Hines M.D.  Signed 03/09/2021 12:10:39    < end of copied text >      TTE / ROSIO:  pending

## 2021-03-09 NOTE — PROGRESS NOTE ADULT - SUBJECTIVE AND OBJECTIVE BOX
SELENE BE  MRN-[id number]  Patient is a 65y old  Male who presents with a chief complaint of chest pain (09 Mar 2021 18:10)    HPI:  65M with PMH of HTN, T2DM, HLD, hypothyroidism, chronic pancreatitis p/w chest pain. Pt states he has been having CP for past 2 weeks - pain was initially only with exertion, located midsternal/epigastric region, burning pain with occasional radiation to the jaw and associated with DUARTE. Pain would improve after about 5 minutes of rest. However, in past few days pt has started having pain even at rest. Denies any associated nausea/vomiting, diaphoresis, palpitations, syncope/lightheadedness. Pt went to see cardiologist for these symptoms and TTE showed inferolateral wall hypokinesis and referred to ED for possible cath. Pt had a normal stress test 2 years ago for pore-op for RLE arthrocentesis. ROS positive for chronic abd pain 2/2 chronic pancreatitis. Prior smoker, quit 1992.     Pt does not know home medications.  (09 Mar 2021 02:17)      Hospital Course:  3/9 Admitted to CICU for ACS. S/P LHC: multivessel CAD.    24 HOUR EVENTS:    REVIEW OF SYSTEMS:   Gen: No fever  EYES/ENT: No visual changes;  No vertigo or throat pain   NECK: No pain   RES:  No shortness of breath or Cough  CARD: No chest pain   GI: No abdominal pain  : No dysuria  NEURO: No weakness  SKIN: No itching, rashes     ICU Vital Signs Last 24 Hrs  T(C): 36.7 (09 Mar 2021 19:30), Max: 36.8 (09 Mar 2021 16:02)  T(F): 98.1 (09 Mar 2021 19:30), Max: 98.3 (09 Mar 2021 16:02)  HR: 74 (09 Mar 2021 21:00) (58 - 100)  BP: 150/84 (09 Mar 2021 19:30) (92/49 - 194/79)  BP(mean): 115 (09 Mar 2021 19:30) (87 - 115)  ABP: --  ABP(mean): --  RR: 20 (09 Mar 2021 21:00) (14 - 22)  SpO2: 96% (09 Mar 2021 21:00) (94% - 100%)    POCT Blood Glucose.: 278 mg/dL (09 Mar 2021 16:39)      PHYSICAL EXAM:   General: No acute distress  Eyes: EOMI, PERRLA, conjunctiva and sclera clear  Chest/Lung: CTAB, no wheezes, rales, or rhonchi  Heart: Regular rate, regular rhythm. Normal S1/S2. No murmurs, rubs, or gallops.  Abdomen: Soft, nontender, nondistended. Normal bowel sounds.  Extremites: 2+ peripheral pulses B/L. No clubbing, cyanosis, or edema.  Neurology: A&O x3, no focal deficits  Skin: No rashes or lesions  ============================I/O===========================   I&O's Detail    ============================ LABS =========================                        14.5   11.30 )-----------( 275      ( 09 Mar 2021 05:18 )             45.3     03-09    143  |  104  |  12  ----------------------------<  78  3.9   |  28  |  0.83    Ca    9.9      09 Mar 2021 05:15  Phos  3.5     03-08  Mg     2.3     03-08    TPro  7.7  /  Alb  4.5  /  TBili  0.3  /  DBili  x   /  AST  22  /  ALT  32  /  AlkPhos  125<H>  03-08    Troponin T, High Sensitivity Result: 51 ng/L (03-09-21 @ 05:15)  Troponin T, High Sensitivity Result: 43 ng/L (03-09-21 @ 02:12)  Troponin T, High Sensitivity Result: 36 ng/L (03-08-21 @ 21:31)    CKMB Units: 2.4 ng/mL (03-09-21 @ 05:15)  CKMB Units: 2.5 ng/mL (03-09-21 @ 02:12)    Creatine Kinase, Serum: 118 U/L (03-09-21 @ 05:15)  Creatine Kinase, Serum: 127 U/L (03-09-21 @ 02:12)    CPK Mass Assay %: 2.0 % (03-09-21 @ 05:15)  CPK Mass Assay %: 2.0 % (03-09-21 @ 02:12)        LIVER FUNCTIONS - ( 08 Mar 2021 21:31 )  Alb: 4.5 g/dL / Pro: 7.7 g/dL / ALK PHOS: 125 U/L / ALT: 32 U/L / AST: 22 U/L / GGT: x           PT/INR - ( 08 Mar 2021 21:31 )   PT: 11.2 sec;   INR: 0.93 ratio         PTT - ( 09 Mar 2021 05:15 )  PTT:70.1 sec        ======================Micro/Rad/Cardio=================  Telemetry: Reviewed   EKG: Reviewed  CXR: Reviewed  Echo: Reviewed  Cath: Reviewed  ======================================================  PAST MEDICAL & SURGICAL HISTORY:  Chronic pancreatitis    Hypothyroidism    HLD (hyperlipidemia)    HTN (hypertension)    DM (diabetes mellitus)    No significant past surgical history      ====================ASSESSMENT ==============  S/P LHC on 3/9: Multivessel CAD  DMT2 w/ Hyperglycemia  Hypothyroidism      Plan:  ====================== NEUROLOGY=====================  A&O x3  - Nonfocal, continue to monitor neuro status per ICU protocol.   - Tylenol PRN for analgesia     acetaminophen 300 mG/codeine 30 mG 1 Tablet(s) Oral every 4 hours PRN moderate and severe pain  ==================== RESPIRATORY======================  Comfortable on room air, SpO2 96%  - Continue to monitor SpO2 via pulse oximetry  - Encourage bedside spirometry     ====================CARDIOVASCULAR==================  Multivessel CAD  - S/P ASA and Brilinta load, started on heparin gtt   - S/P LHC on 3/9, CTS consulted for possible CABG  - C/w afterload reduction with Lopressor   - C/w NTG gtt for CP  - C/w ASA 81mg and Brilinta 90mg BID, c/w hep gtt   - Start on high dose lipitor   - check TSH, lipid, A1c  - check TTE  - monitor on tele    metoprolol tartrate 25 milliGRAM(s) Oral two times a day  nitroglycerin  Infusion 20 MICROgram(s)/Min (6 mL/Hr) IV Continuous <Continuous>    ===================HEMATOLOGIC/ONC ===================  H/H stable.   - Continue to monitor hemoglobin and hematocrit levels.     VTE prophylaxis   - Continue Heparin for venous thromboembolism prophylaxis.     heparin  Infusion. 1000 Unit(s)/Hr (10 mL/Hr) IV Continuous <Continuous>  ===================== RENAL =========================  No active issues   - Continue to monitor I/Os, BUN/Creatinine, and urine output.   - Goal net negative fluid balance. Replete lytes PRN. Keep K> 4 and Mg >2.   ==================== GASTROINTESTINAL===================  Tolerating PO DASH/TLC diet.     dextrose 5%. 1000 milliLiter(s) (50 mL/Hr) IV Continuous <Continuous>  dextrose 5%. 1000 milliLiter(s) (100 mL/Hr) IV Continuous <Continuous>  =======================    ENDOCRINE  =====================  Hx of DMT2  - Glycemic control with Admelog sliding scale, Lantus, Glucagon PRN.   - Monitor blood glucose levels.     Hypothyroidism  - C/w Synthroid  - Monitor TFTs    dextrose 40% Gel 15 Gram(s) Oral once  dextrose 50% Injectable 25 Gram(s) IV Push once  dextrose 50% Injectable 12.5 Gram(s) IV Push once  dextrose 50% Injectable 25 Gram(s) IV Push once  glucagon  Injectable 1 milliGRAM(s) IntraMuscular once  insulin glargine Injectable (LANTUS) 40 Unit(s) SubCutaneous at bedtime  insulin lispro (ADMELOG) corrective regimen sliding scale   SubCutaneous three times a day before meals  insulin lispro (ADMELOG) corrective regimen sliding scale   SubCutaneous at bedtime  levothyroxine 100 MICROGram(s) Oral daily    ========================INFECTIOUS DISEASE================  WBC 11.30, afebrile   - Likely reactive  - Monitor temperature and trend WBC.   - Monitor off abx.     Patient requires continuous monitoring with bedside rhythm monitoring, pulse ox monitoring, and intermittent blood gas analysis. Care plan discussed with ICU care team. Patient remained critical and at risk for life threatening decompensation.  Patient seen, examined and plan discussed with CCU team during rounds.     I have personally provided 35 minutes of critical care time excluding time spent on separate procedures.    By signing my name below, I, Landy Bryant, attest that this documentation has been prepared under the direction and in the presence of KRISTINE Salazar.  Electronically signed: Alonso Duarte, 03-09-21 @ 21:10    I, KRISTINE Salazar, personally performed the services described in this documentation. all medical record entries made by the behzadibjosh were at my direction and in my presence. I have reviewed the chart and agree that the record reflects my personal performance and is accurate and complete  Electronically signed: KRISTINE Salazar.       SELENE BE  MRN-[id number]  Patient is a 65y old  Male who presents with a chief complaint of chest pain (09 Mar 2021 18:10)    HPI:  65M with PMH of HTN, T2DM, HLD, hypothyroidism, chronic pancreatitis p/w chest pain. Pt states he has been having CP for past 2 weeks - pain was initially only with exertion, located midsternal/epigastric region, burning pain with occasional radiation to the jaw and associated with DUARTE. Pain would improve after about 5 minutes of rest. However, in past few days pt has started having pain even at rest. Denies any associated nausea/vomiting, diaphoresis, palpitations, syncope/lightheadedness. Pt went to see cardiologist for these symptoms and TTE showed inferolateral wall hypokinesis and referred to ED for possible cath. Pt had a normal stress test 2 years ago for pore-op for RLE arthrocentesis. ROS positive for chronic abd pain 2/2 chronic pancreatitis. Prior smoker, quit 1992.     Pt does not know home medications.  (09 Mar 2021 02:17)      Hospital Course:  3/9 Admitted to CICU for ACS. S/P LHC: multivessel CAD.    24 HOUR EVENTS:    REVIEW OF SYSTEMS:   Gen: No fever  EYES/ENT: No visual changes;  No vertigo or throat pain   NECK: No pain   RES:  No shortness of breath or Cough  CARD: No chest pain   GI: No abdominal pain  : No dysuria  NEURO: No weakness  SKIN: No itching, rashes     ICU Vital Signs Last 24 Hrs  T(C): 36.7 (09 Mar 2021 19:30), Max: 36.8 (09 Mar 2021 16:02)  T(F): 98.1 (09 Mar 2021 19:30), Max: 98.3 (09 Mar 2021 16:02)  HR: 74 (09 Mar 2021 21:00) (58 - 100)  BP: 150/84 (09 Mar 2021 19:30) (92/49 - 194/79)  BP(mean): 115 (09 Mar 2021 19:30) (87 - 115)  ABP: --  ABP(mean): --  RR: 20 (09 Mar 2021 21:00) (14 - 22)  SpO2: 96% (09 Mar 2021 21:00) (94% - 100%)    POCT Blood Glucose.: 278 mg/dL (09 Mar 2021 16:39)      PHYSICAL EXAM:   General: No acute distress  Eyes: EOMI, PERRLA, conjunctiva and sclera clear  Chest/Lung: CTAB, no wheezes, rales, or rhonchi  Heart: Regular rate, regular rhythm. Normal S1/S2. No murmurs, rubs, or gallops.  Abdomen: Soft, nontender, nondistended. Normal bowel sounds.  Extremites: 2+ peripheral pulses B/L. No clubbing, cyanosis, or edema.  Neurology: A&O x3, no focal deficits  Skin: No rashes or lesions  ============================I/O===========================   I&O's Detail    ============================ LABS =========================                        14.5   11.30 )-----------( 275      ( 09 Mar 2021 05:18 )             45.3     03-09    143  |  104  |  12  ----------------------------<  78  3.9   |  28  |  0.83    Ca    9.9      09 Mar 2021 05:15  Phos  3.5     03-08  Mg     2.3     03-08    TPro  7.7  /  Alb  4.5  /  TBili  0.3  /  DBili  x   /  AST  22  /  ALT  32  /  AlkPhos  125<H>  03-08    Troponin T, High Sensitivity Result: 51 ng/L (03-09-21 @ 05:15)  Troponin T, High Sensitivity Result: 43 ng/L (03-09-21 @ 02:12)  Troponin T, High Sensitivity Result: 36 ng/L (03-08-21 @ 21:31)    CKMB Units: 2.4 ng/mL (03-09-21 @ 05:15)  CKMB Units: 2.5 ng/mL (03-09-21 @ 02:12)    Creatine Kinase, Serum: 118 U/L (03-09-21 @ 05:15)  Creatine Kinase, Serum: 127 U/L (03-09-21 @ 02:12)    CPK Mass Assay %: 2.0 % (03-09-21 @ 05:15)  CPK Mass Assay %: 2.0 % (03-09-21 @ 02:12)        LIVER FUNCTIONS - ( 08 Mar 2021 21:31 )  Alb: 4.5 g/dL / Pro: 7.7 g/dL / ALK PHOS: 125 U/L / ALT: 32 U/L / AST: 22 U/L / GGT: x           PT/INR - ( 08 Mar 2021 21:31 )   PT: 11.2 sec;   INR: 0.93 ratio         PTT - ( 09 Mar 2021 05:15 )  PTT:70.1 sec        ======================Micro/Rad/Cardio=================  Telemetry: Reviewed   EKG: Reviewed  CXR: Reviewed  Cath: Reviewed  ======================================================  PAST MEDICAL & SURGICAL HISTORY:  Chronic pancreatitis    Hypothyroidism    HLD (hyperlipidemia)    HTN (hypertension)    DM (diabetes mellitus)    No significant past surgical history      ====================ASSESSMENT ==============  S/P LHC on 3/9: Multivessel CAD  DMT2 w/ Hyperglycemia  Hypothyroidism      Plan:  ====================== NEUROLOGY=====================  A&O x3  - Nonfocal, continue to monitor neuro status per ICU protocol.   - Tylenol PRN for analgesia     acetaminophen 300 mG/codeine 30 mG 1 Tablet(s) Oral every 4 hours PRN moderate and severe pain  ==================== RESPIRATORY======================  Comfortable on room air, SpO2 96%  - Continue to monitor SpO2 via pulse oximetry  - Encourage bedside spirometry     ====================CARDIOVASCULAR==================  Multivessel CAD  - S/P ASA and Brilinta load, started on heparin gtt   - S/P LHC on 3/9, CTS consulted for possible CABG  - C/w afterload reduction with Lopressor   - C/w NTG gtt for CP  - C/w ASA 81mg and Brilinta 90mg BID, c/w hep gtt   - Start on high dose lipitor   - check TSH, lipid, A1c  - check TTE  - monitor on tele    metoprolol tartrate 25 milliGRAM(s) Oral two times a day  nitroglycerin  Infusion 20 MICROgram(s)/Min (6 mL/Hr) IV Continuous <Continuous>    ===================HEMATOLOGIC/ONC ===================  H/H stable.   - Continue to monitor hemoglobin and hematocrit levels.     VTE prophylaxis   - Continue Heparin for venous thromboembolism prophylaxis.     heparin  Infusion. 1000 Unit(s)/Hr (10 mL/Hr) IV Continuous <Continuous>  ===================== RENAL =========================  No active issues   - Continue to monitor I/Os, BUN/Creatinine, and urine output.   - Goal net negative fluid balance. Replete lytes PRN. Keep K> 4 and Mg >2.   ==================== GASTROINTESTINAL===================  Tolerating PO DASH/TLC diet.     dextrose 5%. 1000 milliLiter(s) (50 mL/Hr) IV Continuous <Continuous>  dextrose 5%. 1000 milliLiter(s) (100 mL/Hr) IV Continuous <Continuous>  =======================    ENDOCRINE  =====================  Hx of DMT2  - Glycemic control with Admelog sliding scale, Lantus, Glucagon PRN.   - Monitor blood glucose levels.     Hypothyroidism  - C/w Synthroid  - Monitor TFTs    dextrose 40% Gel 15 Gram(s) Oral once  dextrose 50% Injectable 25 Gram(s) IV Push once  dextrose 50% Injectable 12.5 Gram(s) IV Push once  dextrose 50% Injectable 25 Gram(s) IV Push once  glucagon  Injectable 1 milliGRAM(s) IntraMuscular once  insulin glargine Injectable (LANTUS) 40 Unit(s) SubCutaneous at bedtime  insulin lispro (ADMELOG) corrective regimen sliding scale   SubCutaneous three times a day before meals  insulin lispro (ADMELOG) corrective regimen sliding scale   SubCutaneous at bedtime  levothyroxine 100 MICROGram(s) Oral daily    ========================INFECTIOUS DISEASE================  WBC 11.30, afebrile   - Likely reactive  - Monitor temperature and trend WBC.   - Monitor off abx.     Patient requires continuous monitoring with bedside rhythm monitoring, pulse ox monitoring, and intermittent blood gas analysis. Care plan discussed with ICU care team. Patient remained critical and at risk for life threatening decompensation.  Patient seen, examined and plan discussed with CCU team during rounds.     I have personally provided 35 minutes of critical care time excluding time spent on separate procedures.    By signing my name below, I, Landy Bryant, attest that this documentation has been prepared under the direction and in the presence of KRISTINE Salazar.  Electronically signed: Alonso Duarte, 03-09-21 @ 21:10    I, KRISTINE Salazar, personally performed the services described in this documentation. all medical record entries made by the behzadibjosh were at my direction and in my presence. I have reviewed the chart and agree that the record reflects my personal performance and is accurate and complete  Electronically signed: KRISTINE Salazar.       SELENE BE  MRN-[id number]  Patient is a 65y old  Male who presents with a chief complaint of chest pain (09 Mar 2021 18:10)    HPI:  65M with PMH of HTN, T2DM, HLD, hypothyroidism, chronic pancreatitis p/w chest pain. Pt states he has been having CP for past 2 weeks - pain was initially only with exertion, located midsternal/epigastric region, burning pain with occasional radiation to the jaw and associated with DUARTE. Pain would improve after about 5 minutes of rest. However, in past few days pt has started having pain even at rest. Denies any associated nausea/vomiting, diaphoresis, palpitations, syncope/lightheadedness. Pt went to see cardiologist for these symptoms and TTE showed inferolateral wall hypokinesis and referred to ED for possible cath. Pt had a normal stress test 2 years ago for pore-op for RLE arthrocentesis. ROS positive for chronic abd pain 2/2 chronic pancreatitis. Prior smoker, quit 1992.     Pt does not know home medications.  (09 Mar 2021 02:17)      Hospital Course:  3/9 Admitted to CICU for S/P LHC: multivessel CAD w/ IABP     24 HOUR EVENTS:    REVIEW OF SYSTEMS:   Gen: No fever  EYES/ENT: No visual changes;  No vertigo or throat pain   NECK: No pain   RES:  No shortness of breath or Cough  CARD: No chest pain   GI: No abdominal pain  : No dysuria  NEURO: No weakness  SKIN: No itching, rashes     ICU Vital Signs Last 24 Hrs  T(C): 36.7 (09 Mar 2021 19:30), Max: 36.8 (09 Mar 2021 16:02)  T(F): 98.1 (09 Mar 2021 19:30), Max: 98.3 (09 Mar 2021 16:02)  HR: 74 (09 Mar 2021 21:00) (58 - 100)  BP: 150/84 (09 Mar 2021 19:30) (92/49 - 194/79)  BP(mean): 115 (09 Mar 2021 19:30) (87 - 115)  ABP: --  ABP(mean): --  RR: 20 (09 Mar 2021 21:00) (14 - 22)  SpO2: 96% (09 Mar 2021 21:00) (94% - 100%)    POCT Blood Glucose.: 278 mg/dL (09 Mar 2021 16:39)      PHYSICAL EXAM:   General: No acute distress  Eyes: EOMI, PERRLA, conjunctiva and sclera clear  Chest/Lung: CTAB, no wheezes, rales, or rhonchi  Heart: Regular rate, regular rhythm. Normal S1/S2. No murmurs, rubs, or gallops.  Abdomen: Soft, nontender, nondistended. Normal bowel sounds.  Extremites: 2+ peripheral pulses B/L. No clubbing, cyanosis, or edema.  Neurology: A&O x3, no focal deficits  Skin: No rashes or lesions  Lines: L Fem A. IABP 3/9 c/d/i no hematoma   ============================I/O===========================   I&O's Detail    ============================ LABS =========================                        14.5   11.30 )-----------( 275      ( 09 Mar 2021 05:18 )             45.3     03-09    143  |  104  |  12  ----------------------------<  78  3.9   |  28  |  0.83    Ca    9.9      09 Mar 2021 05:15  Phos  3.5     03-08  Mg     2.3     03-08    TPro  7.7  /  Alb  4.5  /  TBili  0.3  /  DBili  x   /  AST  22  /  ALT  32  /  AlkPhos  125<H>  03-08    Troponin T, High Sensitivity Result: 51 ng/L (03-09-21 @ 05:15)  Troponin T, High Sensitivity Result: 43 ng/L (03-09-21 @ 02:12)  Troponin T, High Sensitivity Result: 36 ng/L (03-08-21 @ 21:31)    CKMB Units: 2.4 ng/mL (03-09-21 @ 05:15)  CKMB Units: 2.5 ng/mL (03-09-21 @ 02:12)    Creatine Kinase, Serum: 118 U/L (03-09-21 @ 05:15)  Creatine Kinase, Serum: 127 U/L (03-09-21 @ 02:12)    CPK Mass Assay %: 2.0 % (03-09-21 @ 05:15)  CPK Mass Assay %: 2.0 % (03-09-21 @ 02:12)        LIVER FUNCTIONS - ( 08 Mar 2021 21:31 )  Alb: 4.5 g/dL / Pro: 7.7 g/dL / ALK PHOS: 125 U/L / ALT: 32 U/L / AST: 22 U/L / GGT: x           PT/INR - ( 08 Mar 2021 21:31 )   PT: 11.2 sec;   INR: 0.93 ratio         PTT - ( 09 Mar 2021 05:15 )  PTT:70.1 sec        ======================Micro/Rad/Cardio=================  Telemetry: Reviewed   EKG: Reviewed  CXR: Reviewed  Cath: Reviewed  ======================================================  PAST MEDICAL & SURGICAL HISTORY:  Chronic pancreatitis    Hypothyroidism    HLD (hyperlipidemia)    HTN (hypertension)    DM (diabetes mellitus)    No significant past surgical history      ====================ASSESSMENT ==============  S/P LHC on 3/9: Multivessel CAD  DMT2 w/ Hyperglycemia  Hypothyroidism      Plan:  ====================== NEUROLOGY=====================  A&O x3  - Nonfocal, continue to monitor neuro status per ICU protocol.     ==================== RESPIRATORY======================  Comfortable on room air, SpO2 96%  - Continue to monitor SpO2 via pulse oximetry  - Encourage bedside spirometry     ====================CARDIOVASCULAR==================  Multivessel CAD  - S/P ASA and Brilinta load, started on heparin gtt- Brilinta being held for CTS w/u for CABG  - S/P LHC on 3/9: oLAD 90%, mLAD 60%, OM1 70%, OM2 90%, mRCA 95%. No PCI + IABP  - Pt with active CP and HTN augmented diastolic pressures 160s   - C/w NTG gtt for CP resolution and maintain ADp > 90  - Lopressor 25 BID   - Lipitor 80 bedtime   - c/w Hep gtt on IABP   - check TTE, pre-CABG     metoprolol tartrate 25 milliGRAM(s) Oral two times a day  nitroglycerin  Infusion 20 MICROgram(s)/Min (6 mL/Hr) IV Continuous <Continuous>    ===================HEMATOLOGIC/ONC ===================  H/H stable.   - Continue to monitor hemoglobin and hematocrit levels.     VTE prophylaxis   - Continue Heparin for venous thromboembolism prophylaxis and for IABP     heparin  Infusion. 1000 Unit(s)/Hr (10 mL/Hr) IV Continuous <Continuous>  ===================== RENAL =========================  No active issues   - Continue to monitor I/Os, BUN/Creatinine, and urine output.   - Goal net negative fluid balance. Replete lytes PRN. Keep K> 4 and Mg >2.   ==================== GASTROINTESTINAL===================  Tolerating PO DASH/TLC diet.     dextrose 5%. 1000 milliLiter(s) (50 mL/Hr) IV Continuous <Continuous>  dextrose 5%. 1000 milliLiter(s) (100 mL/Hr) IV Continuous <Continuous>  =======================    ENDOCRINE  =====================  Hx of DMT2  - Glycemic control with Admelog sliding scale, Lantus  - Monitor blood glucose levels.     Hypothyroidism  - C/w Synthroid  - Monitor TFTs    dextrose 40% Gel 15 Gram(s) Oral once  dextrose 50% Injectable 25 Gram(s) IV Push once  dextrose 50% Injectable 12.5 Gram(s) IV Push once  dextrose 50% Injectable 25 Gram(s) IV Push once  glucagon  Injectable 1 milliGRAM(s) IntraMuscular once  insulin glargine Injectable (LANTUS) 40 Unit(s) SubCutaneous at bedtime  insulin lispro (ADMELOG) corrective regimen sliding scale   SubCutaneous three times a day before meals  insulin lispro (ADMELOG) corrective regimen sliding scale   SubCutaneous at bedtime  levothyroxine 100 MICROGram(s) Oral daily    ========================INFECTIOUS DISEASE================  WBC 11.30, afebrile   - Likely reactive  - Monitor temperature and trend WBC.   - Monitor off abx.     Patient requires continuous monitoring with bedside rhythm monitoring, pulse ox monitoring, and intermittent blood gas analysis. Care plan discussed with ICU care team. Patient remained critical and at risk for life threatening decompensation.  Patient seen, examined and plan discussed with CCU team during rounds.     I have personally provided 35 minutes of critical care time excluding time spent on separate procedures.    By signing my name below, I, Landy Bryant, attest that this documentation has been prepared under the direction and in the presence of KRISTINE Salazar.  Electronically signed: Alonso Duarte, 03-09-21 @ 21:10    I, KRISTINE Salazar, personally performed the services described in this documentation. all medical record entries made by the behzadibjosh were at my direction and in my presence. I have reviewed the chart and agree that the record reflects my personal performance and is accurate and complete  Electronically signed: KRISTINE Salazar.

## 2021-03-09 NOTE — H&P ADULT - ATTENDING COMMENTS
Patient assigned to me by night hospitalist in charge for management and care for patient for this evening only. Care to be resumed by day hospitalist (Dr Flores) in the morning and thereafter.

## 2021-03-09 NOTE — H&P ADULT - PROBLEM SELECTOR PLAN 5
pt with chronic abd pain, lipase mildly elevated  c/w monitor pt with chronic abd pain, lipase mildly elevated  c/t monitor  c/w pain control

## 2021-03-09 NOTE — CHART NOTE - NSCHARTNOTEFT_GEN_A_CORE
CCU Accept Note    Transfer from: (  ) Medicine    ( X ) Telemetry     (   ) RCU        (    ) Palliative         (   ) Stroke Unit          (   ) __________________    Accepting physician: Dr. Santamaria    HPI: 65M with PMH of HTN, T2DM, HLD, hypothyroidism, chronic pancreatitis p/w chest pain. Pt states he has been having CP for past 2 weeks - pain was initially only with exertion, located midsternal/epigastric region, burning pain with occasional radiation to the jaw and associated with DUARTE. Pain would improve after about 5 minutes of rest. However, in past few days pt has started having pain even at rest. Denies any associated nausea/vomiting, diaphoresis, palpitations, syncope/lightheadedness. Pt went to see cardiologist for these symptoms and TTE showed inferolateral wall hypokinesis and referred to ED for possible cath. Pt had a normal stress test 2 years ago for pore-op for RLE arthrocentesis. ROS positive for chronic abd pain 2/2 chronic pancreatitis. Prior smoker, quit 1992.     Hospital course  3/9 LHC: TVD pending CABG c/b persistent chest pain s/p IABP, admit to CICU    Vital Signs Last 24 Hrs  T(C): 36.8 (09 Mar 2021 16:02), Max: 36.8 (09 Mar 2021 16:02)  T(F): 98.3 (09 Mar 2021 16:02), Max: 98.3 (09 Mar 2021 16:02)  HR: 93 (09 Mar 2021 16:54) (58 - 100)  BP: 148/72 (09 Mar 2021 16:54) (126/79 - 194/79)  RR: 16 (09 Mar 2021 16:02) (15 - 20)  SpO2: 98% (09 Mar 2021 16:02) (94% - 100%)    Allergies: Morphine     MEDICATIONS  (STANDING):  aspirin  chewable 81 milliGRAM(s) Oral daily  atorvastatin 80 milliGRAM(s) Oral at bedtime  chlorhexidine 2% Cloths 1 Application(s) Topical <User Schedule>  dextrose 40% Gel 15 Gram(s) Oral once  dextrose 5%. 1000 milliLiter(s) (50 mL/Hr) IV Continuous <Continuous>  dextrose 5%. 1000 milliLiter(s) (100 mL/Hr) IV Continuous <Continuous>  dextrose 50% Injectable 25 Gram(s) IV Push once  dextrose 50% Injectable 12.5 Gram(s) IV Push once  dextrose 50% Injectable 25 Gram(s) IV Push once  glucagon  Injectable 1 milliGRAM(s) IntraMuscular once  heparin  Infusion. 1000 Unit(s)/Hr (10 mL/Hr) IV Continuous <Continuous>  insulin glargine Injectable (LANTUS) 40 Unit(s) SubCutaneous at bedtime  insulin lispro (ADMELOG) corrective regimen sliding scale   SubCutaneous three times a day before meals  insulin lispro (ADMELOG) corrective regimen sliding scale   SubCutaneous at bedtime  latanoprost 0.005% Ophthalmic Solution 1 Drop(s) Both EYES at bedtime  levothyroxine 100 MICROGram(s) Oral daily  losartan 100 milliGRAM(s) Oral daily  metoprolol tartrate 25 milliGRAM(s) Oral two times a day    MEDICATIONS  (PRN):  acetaminophen 300 mG/codeine 30 mG 1 Tablet(s) Oral every 4 hours PRN moderate and severe pain    LABS                                            14.5                  Neurophils% (auto):   x      (03-09 @ 05:18):    11.30)-----------(275          Lymphocytes% (auto):  x                                             45.3                   Eosinphils% (auto):   x        Manual%: Neutrophils x    ; Lymphocytes x    ; Eosinophils x    ; Bands%: x    ; Blasts x                                    143    |  104    |  12                  Calcium: 9.9   / iCa: x      (03-09 @ 05:15)    ----------------------------<  78        Magnesium: x                                3.9     |  28     |  0.83             Phosphorous: x        TPro  7.7    /  Alb  4.5    /  TBili  0.3    /  DBili  x      /  AST  22     /  ALT  32     /  AlkPhos  125    08 Mar 2021 21:31    ( 03-09 @ 05:15 )   PT: x    ;   INR: x      aPTT: 70.1 sec      A/P: 65M with PMH of HTN, T2DM, HLD, hypothyroidism, chronic pancreatitis p/w NSTEMI s/p LHC revealing TVD pending CABG c/b persistent chest pain requiring IABP.    #NSTEMI s/p LHC: TVD pending CABG  - LHC (3/9): oLAD 90%, mLAD 60%, OM1 70%, OM2 90%, mRCA 95%, LV gram 45%, EDP 14  - S/P IABP for mechanical support given persistent chest pain post LHC  - IABP 1:1 on systemic AC, Heparin gtt per protocol, trend coags   - c/w ASA, trend P2y12 (holding Brilinta)  - c/w high intensity statin, BB, ARB  - Obtain TTE, PFTs, Caroid duplex   - Pending CABG, f/u CTS team      Cayla Moralez [facility] [phone]

## 2021-03-09 NOTE — H&P ADULT - NSHPSOCIALHISTORY_GEN_ALL_CORE
former smoker and etoh abuse - quit 1992, no drugs   lives with his wife  independent ADLs/ambulation, works as a

## 2021-03-09 NOTE — H&P ADULT - PROBLEM SELECTOR PLAN 3
check A1c  hold oral home medications  low ISS and monitor FS ac and hs check A1c  start on lantus 40units and 10units with meals   titrate based on FS   low ISS and monitor FS ac and hs

## 2021-03-10 LAB
ALBUMIN SERPL ELPH-MCNC: 3.8 G/DL — SIGNIFICANT CHANGE UP (ref 3.3–5)
ALP SERPL-CCNC: 106 U/L — SIGNIFICANT CHANGE UP (ref 40–120)
ALT FLD-CCNC: 33 U/L — SIGNIFICANT CHANGE UP (ref 10–45)
ANION GAP SERPL CALC-SCNC: 11 MMOL/L — SIGNIFICANT CHANGE UP (ref 5–17)
APTT BLD: 44.8 SEC — HIGH (ref 27.5–35.5)
APTT BLD: 59.7 SEC — HIGH (ref 27.5–35.5)
APTT BLD: 72.5 SEC — HIGH (ref 27.5–35.5)
AST SERPL-CCNC: 26 U/L — SIGNIFICANT CHANGE UP (ref 10–40)
BASOPHILS # BLD AUTO: 0.06 K/UL — SIGNIFICANT CHANGE UP (ref 0–0.2)
BASOPHILS NFR BLD AUTO: 0.6 % — SIGNIFICANT CHANGE UP (ref 0–2)
BILIRUB SERPL-MCNC: 0.5 MG/DL — SIGNIFICANT CHANGE UP (ref 0.2–1.2)
BUN SERPL-MCNC: 18 MG/DL — SIGNIFICANT CHANGE UP (ref 7–23)
CALCIUM SERPL-MCNC: 9.2 MG/DL — SIGNIFICANT CHANGE UP (ref 8.4–10.5)
CHLORIDE SERPL-SCNC: 102 MMOL/L — SIGNIFICANT CHANGE UP (ref 96–108)
CO2 SERPL-SCNC: 22 MMOL/L — SIGNIFICANT CHANGE UP (ref 22–31)
CREAT SERPL-MCNC: 0.93 MG/DL — SIGNIFICANT CHANGE UP (ref 0.5–1.3)
EOSINOPHIL # BLD AUTO: 0.24 K/UL — SIGNIFICANT CHANGE UP (ref 0–0.5)
EOSINOPHIL NFR BLD AUTO: 2.2 % — SIGNIFICANT CHANGE UP (ref 0–6)
GLUCOSE BLDC GLUCOMTR-MCNC: 105 MG/DL — HIGH (ref 70–99)
GLUCOSE BLDC GLUCOMTR-MCNC: 120 MG/DL — HIGH (ref 70–99)
GLUCOSE BLDC GLUCOMTR-MCNC: 121 MG/DL — HIGH (ref 70–99)
GLUCOSE BLDC GLUCOMTR-MCNC: 126 MG/DL — HIGH (ref 70–99)
GLUCOSE BLDC GLUCOMTR-MCNC: 133 MG/DL — HIGH (ref 70–99)
GLUCOSE BLDC GLUCOMTR-MCNC: 144 MG/DL — HIGH (ref 70–99)
GLUCOSE BLDC GLUCOMTR-MCNC: 163 MG/DL — HIGH (ref 70–99)
GLUCOSE BLDC GLUCOMTR-MCNC: 212 MG/DL — HIGH (ref 70–99)
GLUCOSE BLDC GLUCOMTR-MCNC: 244 MG/DL — HIGH (ref 70–99)
GLUCOSE BLDC GLUCOMTR-MCNC: 252 MG/DL — HIGH (ref 70–99)
GLUCOSE BLDC GLUCOMTR-MCNC: 277 MG/DL — HIGH (ref 70–99)
GLUCOSE BLDC GLUCOMTR-MCNC: 96 MG/DL — SIGNIFICANT CHANGE UP (ref 70–99)
GLUCOSE SERPL-MCNC: 315 MG/DL — HIGH (ref 70–99)
HCT VFR BLD CALC: 43.2 % — SIGNIFICANT CHANGE UP (ref 39–50)
HGB BLD-MCNC: 14.2 G/DL — SIGNIFICANT CHANGE UP (ref 13–17)
IMM GRANULOCYTES NFR BLD AUTO: 0.5 % — SIGNIFICANT CHANGE UP (ref 0–1.5)
INR BLD: 0.98 RATIO — SIGNIFICANT CHANGE UP (ref 0.88–1.16)
LIDOCAIN IGE QN: 24 U/L — SIGNIFICANT CHANGE UP (ref 7–60)
LYMPHOCYTES # BLD AUTO: 2.9 K/UL — SIGNIFICANT CHANGE UP (ref 1–3.3)
LYMPHOCYTES # BLD AUTO: 27.1 % — SIGNIFICANT CHANGE UP (ref 13–44)
MAGNESIUM SERPL-MCNC: 2.2 MG/DL — SIGNIFICANT CHANGE UP (ref 1.6–2.6)
MCHC RBC-ENTMCNC: 27.9 PG — SIGNIFICANT CHANGE UP (ref 27–34)
MCHC RBC-ENTMCNC: 32.9 GM/DL — SIGNIFICANT CHANGE UP (ref 32–36)
MCV RBC AUTO: 84.9 FL — SIGNIFICANT CHANGE UP (ref 80–100)
MONOCYTES # BLD AUTO: 0.77 K/UL — SIGNIFICANT CHANGE UP (ref 0–0.9)
MONOCYTES NFR BLD AUTO: 7.2 % — SIGNIFICANT CHANGE UP (ref 2–14)
NEUTROPHILS # BLD AUTO: 6.7 K/UL — SIGNIFICANT CHANGE UP (ref 1.8–7.4)
NEUTROPHILS NFR BLD AUTO: 62.4 % — SIGNIFICANT CHANGE UP (ref 43–77)
NRBC # BLD: 0 /100 WBCS — SIGNIFICANT CHANGE UP (ref 0–0)
PA ADP PRP-ACNC: 100 PRU — LOW (ref 194–417)
PA ADP PRP-ACNC: 192 PRU — LOW (ref 194–417)
PHOSPHATE SERPL-MCNC: 2.8 MG/DL — SIGNIFICANT CHANGE UP (ref 2.5–4.5)
PLATELET # BLD AUTO: 250 K/UL — SIGNIFICANT CHANGE UP (ref 150–400)
POTASSIUM SERPL-MCNC: 4.4 MMOL/L — SIGNIFICANT CHANGE UP (ref 3.5–5.3)
POTASSIUM SERPL-SCNC: 4.4 MMOL/L — SIGNIFICANT CHANGE UP (ref 3.5–5.3)
PROT SERPL-MCNC: 6.7 G/DL — SIGNIFICANT CHANGE UP (ref 6–8.3)
PROTHROM AB SERPL-ACNC: 11.8 SEC — SIGNIFICANT CHANGE UP (ref 10.6–13.6)
RBC # BLD: 5.09 M/UL — SIGNIFICANT CHANGE UP (ref 4.2–5.8)
RBC # FLD: 13.7 % — SIGNIFICANT CHANGE UP (ref 10.3–14.5)
SODIUM SERPL-SCNC: 135 MMOL/L — SIGNIFICANT CHANGE UP (ref 135–145)
WBC # BLD: 10.72 K/UL — HIGH (ref 3.8–10.5)
WBC # FLD AUTO: 10.72 K/UL — HIGH (ref 3.8–10.5)

## 2021-03-10 PROCEDURE — 71045 X-RAY EXAM CHEST 1 VIEW: CPT | Mod: 26

## 2021-03-10 PROCEDURE — 94010 BREATHING CAPACITY TEST: CPT | Mod: 26

## 2021-03-10 PROCEDURE — 93010 ELECTROCARDIOGRAM REPORT: CPT

## 2021-03-10 PROCEDURE — 93880 EXTRACRANIAL BILAT STUDY: CPT | Mod: 26

## 2021-03-10 PROCEDURE — 93010 ELECTROCARDIOGRAM REPORT: CPT | Mod: 77

## 2021-03-10 PROCEDURE — 99291 CRITICAL CARE FIRST HOUR: CPT | Mod: 25

## 2021-03-10 PROCEDURE — 99291 CRITICAL CARE FIRST HOUR: CPT

## 2021-03-10 PROCEDURE — 93306 TTE W/DOPPLER COMPLETE: CPT | Mod: 26

## 2021-03-10 PROCEDURE — 99292 CRITICAL CARE ADDL 30 MIN: CPT | Mod: 25

## 2021-03-10 RX ORDER — HYDROMORPHONE HYDROCHLORIDE 2 MG/ML
0.5 INJECTION INTRAMUSCULAR; INTRAVENOUS; SUBCUTANEOUS ONCE
Refills: 0 | Status: DISCONTINUED | OUTPATIENT
Start: 2021-03-10 | End: 2021-03-10

## 2021-03-10 RX ORDER — INSULIN GLARGINE 100 [IU]/ML
40 INJECTION, SOLUTION SUBCUTANEOUS AT BEDTIME
Refills: 0 | Status: DISCONTINUED | OUTPATIENT
Start: 2021-03-10 | End: 2021-03-12

## 2021-03-10 RX ORDER — DEXTROSE 50 % IN WATER 50 %
15 SYRINGE (ML) INTRAVENOUS ONCE
Refills: 0 | Status: DISCONTINUED | OUTPATIENT
Start: 2021-03-10 | End: 2021-03-12

## 2021-03-10 RX ORDER — METOPROLOL TARTRATE 50 MG
25 TABLET ORAL EVERY 8 HOURS
Refills: 0 | Status: DISCONTINUED | OUTPATIENT
Start: 2021-03-10 | End: 2021-03-11

## 2021-03-10 RX ORDER — DEXTROSE 50 % IN WATER 50 %
50 SYRINGE (ML) INTRAVENOUS
Refills: 0 | Status: DISCONTINUED | OUTPATIENT
Start: 2021-03-10 | End: 2021-03-12

## 2021-03-10 RX ORDER — SODIUM CHLORIDE 9 MG/ML
1000 INJECTION, SOLUTION INTRAVENOUS
Refills: 0 | Status: DISCONTINUED | OUTPATIENT
Start: 2021-03-10 | End: 2021-03-12

## 2021-03-10 RX ORDER — BACITRACIN ZINC 500 UNIT/G
1 OINTMENT IN PACKET (EA) TOPICAL EVERY 12 HOURS
Refills: 0 | Status: DISCONTINUED | OUTPATIENT
Start: 2021-03-10 | End: 2021-03-12

## 2021-03-10 RX ORDER — INSULIN HUMAN 100 [IU]/ML
3 INJECTION, SOLUTION SUBCUTANEOUS ONCE
Refills: 0 | Status: COMPLETED | OUTPATIENT
Start: 2021-03-10 | End: 2021-03-10

## 2021-03-10 RX ORDER — INSULIN LISPRO 100/ML
10 VIAL (ML) SUBCUTANEOUS
Refills: 0 | Status: DISCONTINUED | OUTPATIENT
Start: 2021-03-10 | End: 2021-03-12

## 2021-03-10 RX ORDER — INSULIN HUMAN 100 [IU]/ML
3 INJECTION, SOLUTION SUBCUTANEOUS
Qty: 100 | Refills: 0 | Status: DISCONTINUED | OUTPATIENT
Start: 2021-03-10 | End: 2021-03-10

## 2021-03-10 RX ORDER — INSULIN LISPRO 100/ML
VIAL (ML) SUBCUTANEOUS
Refills: 0 | Status: DISCONTINUED | OUTPATIENT
Start: 2021-03-10 | End: 2021-03-12

## 2021-03-10 RX ORDER — GLUCAGON INJECTION, SOLUTION 0.5 MG/.1ML
1 INJECTION, SOLUTION SUBCUTANEOUS ONCE
Refills: 0 | Status: DISCONTINUED | OUTPATIENT
Start: 2021-03-10 | End: 2021-03-12

## 2021-03-10 RX ORDER — INSULIN LISPRO 100/ML
VIAL (ML) SUBCUTANEOUS AT BEDTIME
Refills: 0 | Status: DISCONTINUED | OUTPATIENT
Start: 2021-03-10 | End: 2021-03-12

## 2021-03-10 RX ORDER — ACETAMINOPHEN WITH CODEINE 300MG-30MG
1 TABLET ORAL EVERY 4 HOURS
Refills: 0 | Status: DISCONTINUED | OUTPATIENT
Start: 2021-03-10 | End: 2021-03-12

## 2021-03-10 RX ADMIN — INSULIN HUMAN 3 UNIT(S)/HR: 100 INJECTION, SOLUTION SUBCUTANEOUS at 09:25

## 2021-03-10 RX ADMIN — Medication 1 TABLET(S): at 17:01

## 2021-03-10 RX ADMIN — HYDROMORPHONE HYDROCHLORIDE 0.5 MILLIGRAM(S): 2 INJECTION INTRAMUSCULAR; INTRAVENOUS; SUBCUTANEOUS at 08:19

## 2021-03-10 RX ADMIN — HYDROMORPHONE HYDROCHLORIDE 0.5 MILLIGRAM(S): 2 INJECTION INTRAMUSCULAR; INTRAVENOUS; SUBCUTANEOUS at 19:57

## 2021-03-10 RX ADMIN — HEPARIN SODIUM 1200 UNIT(S)/HR: 5000 INJECTION INTRAVENOUS; SUBCUTANEOUS at 05:57

## 2021-03-10 RX ADMIN — Medication 6 MICROGRAM(S)/MIN: at 19:11

## 2021-03-10 RX ADMIN — HEPARIN SODIUM 1200 UNIT(S)/HR: 5000 INJECTION INTRAVENOUS; SUBCUTANEOUS at 14:10

## 2021-03-10 RX ADMIN — INSULIN GLARGINE 40 UNIT(S): 100 INJECTION, SOLUTION SUBCUTANEOUS at 21:16

## 2021-03-10 RX ADMIN — Medication 1: at 21:20

## 2021-03-10 RX ADMIN — LATANOPROST 1 DROP(S): 0.05 SOLUTION/ DROPS OPHTHALMIC; TOPICAL at 21:22

## 2021-03-10 RX ADMIN — INSULIN HUMAN 3 UNIT(S): 100 INJECTION, SOLUTION SUBCUTANEOUS at 09:27

## 2021-03-10 RX ADMIN — Medication 100 MICROGRAM(S): at 05:57

## 2021-03-10 RX ADMIN — Medication 25 MILLIGRAM(S): at 21:21

## 2021-03-10 RX ADMIN — Medication 25 MILLIGRAM(S): at 05:57

## 2021-03-10 RX ADMIN — ATORVASTATIN CALCIUM 80 MILLIGRAM(S): 80 TABLET, FILM COATED ORAL at 21:21

## 2021-03-10 RX ADMIN — CHLORHEXIDINE GLUCONATE 1 APPLICATION(S): 213 SOLUTION TOPICAL at 05:59

## 2021-03-10 RX ADMIN — HYDROMORPHONE HYDROCHLORIDE 0.5 MILLIGRAM(S): 2 INJECTION INTRAMUSCULAR; INTRAVENOUS; SUBCUTANEOUS at 20:15

## 2021-03-10 RX ADMIN — Medication 25 MILLIGRAM(S): at 17:01

## 2021-03-10 RX ADMIN — HEPARIN SODIUM 1200 UNIT(S)/HR: 5000 INJECTION INTRAVENOUS; SUBCUTANEOUS at 20:21

## 2021-03-10 RX ADMIN — Medication 25 MILLIGRAM(S): at 12:25

## 2021-03-10 RX ADMIN — Medication 81 MILLIGRAM(S): at 12:25

## 2021-03-10 RX ADMIN — HYDROMORPHONE HYDROCHLORIDE 0.5 MILLIGRAM(S): 2 INJECTION INTRAMUSCULAR; INTRAVENOUS; SUBCUTANEOUS at 08:49

## 2021-03-10 RX ADMIN — Medication 10 UNIT(S): at 20:10

## 2021-03-10 RX ADMIN — Medication 1 APPLICATION(S): at 21:21

## 2021-03-10 NOTE — PROGRESS NOTE ADULT - ASSESSMENT
A/P: 65M with PMH of HTN, T2DM, HLD, hypothyroidism, chronic pancreatitis p/w NSTEMI s/p LHC revealing TVD pending CABG c/b persistent chest pain requiring IABP.    NEUROLOGY  A&O x3  - Nonfocal, continue to monitor neuro status per ICU protocol.     RESPIRATORY  Comfortable on room air, SpO2 96%  - Continue to monitor SpO2 via pulse oximetry  - Encourage bedside spirometry     CARDIOVASCULAR  Multivessel CAD  - S/P ASA and Brilinta load, started on heparin gtt- Brilinta being held for CTS w/u for CABG  - S/P LHC on 3/9: oLAD 90%, mLAD 60%, OM1 70%, OM2 90%, mRCA 95%. No PCI + IABP  - Pt with active CP and HTN augmented diastolic pressures 160s   - C/w NTG gtt for CP resolution and maintain ADp > 90  - Lopressor 25 BID   - Lipitor 80 bedtime   - c/w Hep gtt on IABP   - check TTE, pre-CABG     metoprolol tartrate 25 milliGRAM(s) Oral two times a day  nitroglycerin  Infusion 20 MICROgram(s)/Min (6 mL/Hr) IV Continuous <Continuous>    HEMATOLOGIC/ONC  H/H stable.   - Continue to monitor hemoglobin and hematocrit levels.     VTE prophylaxis   - Continue Heparin for venous thromboembolism prophylaxis and for IABP     heparin  Infusion. 1000 Unit(s)/Hr (10 mL/Hr) IV Continuous <Continuous>    RENAL   No active issues   - Continue to monitor I/Os, BUN/Creatinine, and urine output.   - Goal net negative fluid balance. Replete lytes PRN. Keep K> 4 and Mg >2.     GASTROINTESTINAL  Tolerating PO DASH/TLC diet.     dextrose 5%. 1000 milliLiter(s) (50 mL/Hr) IV Continuous <Continuous>  dextrose 5%. 1000 milliLiter(s) (100 mL/Hr) IV Continuous <Continuous>    ENDOCRINE   Hx of DMT2  - Glycemic control with Admelog sliding scale, Lantus  - Monitor blood glucose levels.     Hypothyroidism  - C/w Synthroid  - Monitor TFTs    dextrose 40% Gel 15 Gram(s) Oral once  dextrose 50% Injectable 25 Gram(s) IV Push once  dextrose 50% Injectable 12.5 Gram(s) IV Push once  dextrose 50% Injectable 25 Gram(s) IV Push once  glucagon  Injectable 1 milliGRAM(s) IntraMuscular once  insulin glargine Injectable (LANTUS) 40 Unit(s) SubCutaneous at bedtime  insulin lispro (ADMELOG) corrective regimen sliding scale   SubCutaneous three times a day before meals  insulin lispro (ADMELOG) corrective regimen sliding scale   SubCutaneous at bedtime  levothyroxine 100 MICROGram(s) Oral daily    INFECTIOUS DISEASE  WBC 11.30, afebrile   - Likely reactive  - Monitor temperature and trend WBC.   - Monitor off abx.    A/P: 65M with PMH of HTN, T2DM, HLD, hypothyroidism, chronic pancreatitis p/w NSTEMI s/p LHC revealing TVD pending CABG c/b persistent chest pain requiring IABP.    NEUROLOGY  A&O x3  - Nonfocal, continue to monitor neuro status per ICU protocol.     RESPIRATORY  Comfortable on room air, SpO2 96%  - Continue to monitor SpO2 via pulse oximetry  - Encourage bedside spirometry     CARDIOVASCULAR  Multivessel CAD  - S/P ASA and Brilinta load, started on heparin gtt- Brilinta being held for CTS w/u for CABG  - S/P LHC on 3/9: oLAD 90%, mLAD 60%, OM1 70%, OM2 90%, mRCA 95%. No PCI + IABP  - Pt with active CP and HTN augmented diastolic pressures 160s   - C/w NTG gtt for CP resolution and maintain ADp > 90  - Lopressor 25 TID to optimize cardiac demand and output mismatch with goals to minimize angina   - Lipitor 80 bedtime   - c/w Hep gtt on IABP   - check TTE, pre-CABG   - per CT surgery, obtain PFTs and carotid duplex prior to op     HEMATOLOGIC/ONC  H/H stable.   - Continue to monitor hemoglobin and hematocrit levels.     VTE prophylaxis   - Continue Heparin for venous thromboembolism prophylaxis and for IABP     RENAL   No active issues   - Continue to monitor I/Os, BUN/Creatinine, and urine output.   - Goal net negative fluid balance. Replete lytes PRN. Keep K> 4 and Mg >2.     GASTROINTESTINAL  Tolerating PO DASH/TLC diet.     ENDOCRINE   Hx of DMT2  - Glycemic control with Admelog sliding scale, Lantus  - Monitor blood glucose levels  - F/u A1C     Hypothyroidism  - C/w Synthroid  - Monitor TFTs    INFECTIOUS DISEASE  WBC 11.30, afebrile   - Likely reactive  - Monitor temperature and trend WBC.   - Monitor off abx.

## 2021-03-10 NOTE — PROGRESS NOTE ADULT - SUBJECTIVE AND OBJECTIVE BOX
Cardiovascular Disease Progress Note    Overnight events: No acute events overnight.  sp iabp. still with ongoing chest pains at times. no sob/palps/dizziness  Otherwise review of systems negative    Objective Findings:  T(C): 37 (03-10-21 @ 05:00), Max: 37 (03-10-21 @ 05:00)  HR: 84 (03-10-21 @ 06:00) (58 - 100)  BP: 150/84 (03-09-21 @ 19:30) (92/49 - 194/79)  RR: 15 (03-10-21 @ 06:00) (10 - 25)  SpO2: 93% (03-10-21 @ 06:00) (91% - 98%)  Wt(kg): --  Daily Height in cm: 175.26 (09 Mar 2021 08:27)    Daily       Physical Exam:  Gen: NAD  HEENT: EOMI  CV: RRR, normal S1 + S2, no m/r/g  Lungs: CTAB  Abd: soft, non-tender  Ext: No edema. groin c/d/i    Telemetry: nsr    Laboratory Data:                        14.2   10.72 )-----------( 250      ( 10 Mar 2021 03:57 )             43.2     03-10    135  |  102  |  18  ----------------------------<  315<H>  4.4   |  22  |  0.93    Ca    9.2      10 Mar 2021 03:57  Phos  2.8     03-10  Mg     2.2     03-10    TPro  6.7  /  Alb  3.8  /  TBili  0.5  /  DBili  x   /  AST  26  /  ALT  33  /  AlkPhos  106  03-10    PT/INR - ( 08 Mar 2021 21:31 )   PT: 11.2 sec;   INR: 0.93 ratio         PTT - ( 10 Mar 2021 03:57 )  PTT:44.8 sec  CARDIAC MARKERS ( 09 Mar 2021 05:15 )  x     / x     / 118 U/L / x     / 2.4 ng/mL  CARDIAC MARKERS ( 09 Mar 2021 02:12 )  x     / x     / 127 U/L / x     / 2.5 ng/mL          Inpatient Medications:  MEDICATIONS  (STANDING):  aspirin  chewable 81 milliGRAM(s) Oral daily  atorvastatin 80 milliGRAM(s) Oral at bedtime  chlorhexidine 2% Cloths 1 Application(s) Topical <User Schedule>  dextrose 40% Gel 15 Gram(s) Oral once  dextrose 5%. 1000 milliLiter(s) (50 mL/Hr) IV Continuous <Continuous>  dextrose 5%. 1000 milliLiter(s) (100 mL/Hr) IV Continuous <Continuous>  dextrose 50% Injectable 25 Gram(s) IV Push once  dextrose 50% Injectable 12.5 Gram(s) IV Push once  dextrose 50% Injectable 25 Gram(s) IV Push once  glucagon  Injectable 1 milliGRAM(s) IntraMuscular once  heparin  Infusion. 1000 Unit(s)/Hr (10 mL/Hr) IV Continuous <Continuous>  insulin glargine Injectable (LANTUS) 40 Unit(s) SubCutaneous at bedtime  insulin lispro (ADMELOG) corrective regimen sliding scale   SubCutaneous three times a day before meals  insulin lispro (ADMELOG) corrective regimen sliding scale   SubCutaneous at bedtime  insulin lispro Injectable (ADMELOG) 10 Unit(s) SubCutaneous three times a day before meals  latanoprost 0.005% Ophthalmic Solution 1 Drop(s) Both EYES at bedtime  levothyroxine 100 MICROGram(s) Oral daily  metoprolol tartrate 25 milliGRAM(s) Oral two times a day  nitroglycerin  Infusion 20 MICROgram(s)/Min (6 mL/Hr) IV Continuous <Continuous>      Assessment:  -unstable angina  -mvd  -htn  -hld  -dm  -chronic pancreatitis    Recs:  s/p lhc with mvd, awating cabg with dr martinez  iabp 1:1 for coronary perfusion, would start low dose nitro gtt given ongoing chest pains  hold brilinta, f/u p2y12 assay  cw asa, statin beta blockers and hep gtt  f/u tte  fu cts recs  care per ccu        Over 35 minutes spent on total encounter; more than 50% of the visit was spent counseling and/or coordinating care by the attending physician. Case discussed with CCU resident     Alexei Fraga MD   Cardiovascular Disease  [phone] Cardiovascular Disease Progress Note    Overnight events: No acute events overnight.  sp iabp. still with ongoing chest pains at times. no sob/palps/dizziness  Otherwise review of systems negative    Objective Findings:  T(C): 37 (03-10-21 @ 05:00), Max: 37 (03-10-21 @ 05:00)  HR: 84 (03-10-21 @ 06:00) (58 - 100)  BP: 150/84 (03-09-21 @ 19:30) (92/49 - 194/79)  RR: 15 (03-10-21 @ 06:00) (10 - 25)  SpO2: 93% (03-10-21 @ 06:00) (91% - 98%)  Wt(kg): --  Daily Height in cm: 175.26 (09 Mar 2021 08:27)    Daily       Physical Exam:  Gen: NAD  HEENT: EOMI  CV: RRR, normal S1 + S2, no m/r/g  Lungs: CTAB  Abd: soft, non-tender  Ext: No edema. groin c/d/i    Telemetry: nsr    Laboratory Data:                        14.2   10.72 )-----------( 250      ( 10 Mar 2021 03:57 )             43.2     03-10    135  |  102  |  18  ----------------------------<  315<H>  4.4   |  22  |  0.93    Ca    9.2      10 Mar 2021 03:57  Phos  2.8     03-10  Mg     2.2     03-10    TPro  6.7  /  Alb  3.8  /  TBili  0.5  /  DBili  x   /  AST  26  /  ALT  33  /  AlkPhos  106  03-10    PT/INR - ( 08 Mar 2021 21:31 )   PT: 11.2 sec;   INR: 0.93 ratio         PTT - ( 10 Mar 2021 03:57 )  PTT:44.8 sec  CARDIAC MARKERS ( 09 Mar 2021 05:15 )  x     / x     / 118 U/L / x     / 2.4 ng/mL  CARDIAC MARKERS ( 09 Mar 2021 02:12 )  x     / x     / 127 U/L / x     / 2.5 ng/mL          Inpatient Medications:  MEDICATIONS  (STANDING):  aspirin  chewable 81 milliGRAM(s) Oral daily  atorvastatin 80 milliGRAM(s) Oral at bedtime  chlorhexidine 2% Cloths 1 Application(s) Topical <User Schedule>  dextrose 40% Gel 15 Gram(s) Oral once  dextrose 5%. 1000 milliLiter(s) (50 mL/Hr) IV Continuous <Continuous>  dextrose 5%. 1000 milliLiter(s) (100 mL/Hr) IV Continuous <Continuous>  dextrose 50% Injectable 25 Gram(s) IV Push once  dextrose 50% Injectable 12.5 Gram(s) IV Push once  dextrose 50% Injectable 25 Gram(s) IV Push once  glucagon  Injectable 1 milliGRAM(s) IntraMuscular once  heparin  Infusion. 1000 Unit(s)/Hr (10 mL/Hr) IV Continuous <Continuous>  insulin glargine Injectable (LANTUS) 40 Unit(s) SubCutaneous at bedtime  insulin lispro (ADMELOG) corrective regimen sliding scale   SubCutaneous three times a day before meals  insulin lispro (ADMELOG) corrective regimen sliding scale   SubCutaneous at bedtime  insulin lispro Injectable (ADMELOG) 10 Unit(s) SubCutaneous three times a day before meals  latanoprost 0.005% Ophthalmic Solution 1 Drop(s) Both EYES at bedtime  levothyroxine 100 MICROGram(s) Oral daily  metoprolol tartrate 25 milliGRAM(s) Oral two times a day  nitroglycerin  Infusion 20 MICROgram(s)/Min (6 mL/Hr) IV Continuous <Continuous>      Assessment:  -unstable angina  -mvd  -htn  -hld  -dm  -chronic pancreatitis    Recs:  s/p lhc with mvd, awating cabg with dr martinez  iabp 1:1 for coronary perfusion, uptitrate nitro gtt as hemos allow given ongoing chest pains  hold brilinta, f/u p2y12 assay  cw asa, statin beta blockers and hep gtt  f/u tte  fu cts recs  care per ccu        Over 35 minutes spent on total encounter; more than 50% of the visit was spent counseling and/or coordinating care by the attending physician. Case discussed with CCU resident     Alexei Fraga MD   Cardiovascular Disease  [phone]

## 2021-03-10 NOTE — PROGRESS NOTE ADULT - ASSESSMENT
65M with PMH of HTN, T2DM, HLD, hypothyroidism, chronic pancreatitis p/w chest pain x 2 weeks, found with inferolateral wall hypokinesis with inferior TWI on EKG as outpt, admitted for c/f ACS.     CAD/  Unstable angina.   - s/p ASA and Brilinta load, started on heparin gtt Hold Brilinta for CABG   - BB  - Lipitor  - SLN for CP as needed.- TTE  - IABP    Essential hypertension.   - start lopressor 25mg BID for now   - monitor routinely.    Type 2 diabetes mellitus with hyperglycemia, with long-term current use of insulin.  - start on lantus 40units and 10units with meals   - titrate based on FS   - low ISS and monitor FS ac and hs.  Hypothyroidism.    - continue Synthroid  - follow TSH.  Chronic pancreatitis.   - chronic abd pain, lipase mildly elevated  - c/t monitor  - c/w pain control.  HLD (hyperlipidemia).   - high intensity statin.    CCU care    Sriram Flores MD pager [phone]

## 2021-03-10 NOTE — PROGRESS NOTE ADULT - ATTENDING COMMENTS
I have personally seen, examined and participated in the care of this patient. I have reviewed all pertinent clinical information, including history, physical exam, plan and the resident's note. I agree with the resident's note with the following additions:    Chronic pancreatitis was found to have multi vessel coronary disease with ongoing chest pain requiring IABP  Nitroglycerin infusion for chest pain, now resolved, and hypertension  ASA, holding P2Y12 inhibitor pending CABG  Slightly hypertensive, HR 60s-70s on beta blocker - titrate up as tolerated  O2 sats mid to high 90s on nasal cannula  DASH/diabetic diet  Normal renal function, compensated on exam  H/H acceptable on Heparin drip  Afebrile, no antibiotics  Sugars poorly controlled - start Insulin drip  IABP 3/9    The patient required critical care management and I personally provided 35 minutes of non-continuous care to the patient concurrently with the resident/fellow/nurse practitioner, excluding separate procedures and time spent teaching, in addition to discussing the patient and plan at length with the CICU staff and helping coordinate care. I have personally seen, examined and participated in the care of this patient. I have reviewed all pertinent clinical information, including history, physical exam, plan and the resident's note. I agree with the resident's note with the following additions:    Chronic pancreatitis was found to have multi vessel coronary disease with ongoing chest pain requiring IABP  Nitroglycerin infusion for chest pain, now resolved, and hypertension  ASA, holding P2Y12 inhibitor pending CABG  Slightly hypertensive, HR 60s-70s on beta blocker - titrate up as tolerated  O2 sats mid to high 90s on nasal cannula  DASH/diabetic diet  Normal renal function, compensated on exam  H/H acceptable on Heparin drip  Afebrile, no antibiotics  Sugars poorly controlled - start Insulin drip  Restart outpatient Tylenol with Codeine for chronic pancreatitis  IABP 3/9    The patient required critical care management and I personally provided 35 minutes of non-continuous care to the patient concurrently with the resident/fellow/nurse practitioner, excluding separate procedures and time spent teaching, in addition to discussing the patient and plan at length with the CICU staff and helping coordinate care. I have personally seen, examined and participated in the care of this patient. I have reviewed all pertinent clinical information, including history, physical exam, plan and the resident's note. I agree with the resident's note with the following additions:    Chronic pancreatitis was found to have multi vessel coronary disease with ongoing chest pain requiring IABP  Nitroglycerin infusion for chest pain, now resolved, and hypertension  ASA, holding P2Y12 inhibitor pending CABG  CT Surgery has been consulted  Slightly hypertensive, HR 60s-70s on beta blocker - titrate up as tolerated  O2 sats mid to high 90s on nasal cannula  DASH/diabetic diet  Normal renal function, compensated on exam  H/H acceptable on Heparin drip  Afebrile, no antibiotics  Sugars poorly controlled - start Insulin drip  Restart outpatient Tylenol with Codeine for chronic pancreatitis  IABP 3/9    The patient required critical care management and I personally provided 75 minutes of non-continuous care to the patient concurrently with the resident/fellow/nurse practitioner, excluding separate procedures and time spent teaching, in addition to discussing the patient and plan at length with the CICU staff and helping coordinate care.

## 2021-03-10 NOTE — PROGRESS NOTE ADULT - SUBJECTIVE AND OBJECTIVE BOX
SELENE BE  MRN-[id number]  Patient is a 65y old  Male who presents with a chief complaint of chest pain (10 Mar 2021 07:12)    HPI:  65M with PMH of HTN, T2DM, HLD, hypothyroidism, chronic pancreatitis p/w chest pain. Pt states he has been having CP for past 2 weeks - pain was initially only with exertion, located midsternal/epigastric region, burning pain with occasional radiation to the jaw and associated with DUARTE. Pain would improve after about 5 minutes of rest. However, in past few days pt has started having pain even at rest. Denies any associated nausea/vomiting, diaphoresis, palpitations, syncope/lightheadedness. Pt went to see cardiologist for these symptoms and TTE showed inferolateral wall hypokinesis and referred to ED for possible cath. Pt had a normal stress test 2 years ago for pore-op for RLE arthrocentesis. ROS positive for chronic abd pain 2/2 chronic pancreatitis. Prior smoker, quit 1992.     Pt does not know home medications.  (09 Mar 2021 02:17)      Hospital Course:  3/9 Admitted to CICU for S/P LHC: multivessel CAD w/ IABP     24 HOUR EVENTS:    REVIEW OF SYSTEMS:   Constitutional: No weakness, fevers, or chills  Eyes/ENT: No visual changes  Respiratory: No cough, wheezing, hemoptysis  Cardiovascular: No chest pain, no palpitations  Gastrointestinal: No abdominal pain. No nausea, vomiting, hematemesis.   Genitourinary: No dysuria  Neurological: No numbness, no weakness  Skin: No itching, rashes    ICU Vital Signs Last 24 Hrs  T(C): 36.8 (10 Mar 2021 19:00), Max: 37 (10 Mar 2021 05:00)  T(F): 98.2 (10 Mar 2021 19:00), Max: 98.6 (10 Mar 2021 05:00)  HR: 92 (10 Mar 2021 20:00) (66 - 92)  BP: --  BP(mean): --  ABP: --  ABP(mean): --  RR: 29 (10 Mar 2021 20:00) (10 - 29)  SpO2: 94% (10 Mar 2021 20:00) (91% - 97%)      I&O's Summary    09 Mar 2021 07:01  -  10 Mar 2021 07:00  --------------------------------------------------------  IN: 228 mL / OUT: 275 mL / NET: -47 mL    10 Mar 2021 07:01  -  10 Mar 2021 20:40  --------------------------------------------------------  IN: 960 mL / OUT: 730 mL / NET: 230 mL      POCT Blood Glucose.: 144 mg/dL (10 Mar 2021 20:03)      PHYSICAL EXAM:   General: No acute distress  Eyes: EOMI, PERRLA, conjunctiva and sclera clear  Chest/Lung: CTAB, no wheezes, rales, or rhonchi  Heart: Regular rate, regular rhythm. Normal S1/S2. No murmurs, rubs, or gallops.  Abdomen: Soft, nontender, nondistended. Normal bowel sounds.  Extremites: 2+ peripheral pulses B/L. No clubbing, cyanosis, or edema.  Neurology: A&O x3, no focal deficits  Skin: No rashes or lesions  Lines: L Fem A. IABP 3/9 c/d/i no hematoma     ============================I/O===========================   I&O's Detail    09 Mar 2021 07:01  -  10 Mar 2021 07:00  --------------------------------------------------------  IN:    Heparin Infusion: 120 mL    Nitroglycerin: 108 mL  Total IN: 228 mL    OUT:    Voided (mL): 275 mL  Total OUT: 275 mL    Total NET: -47 mL      10 Mar 2021 07:01  -  10 Mar 2021 20:40  --------------------------------------------------------  IN:    Heparin Infusion: 156 mL    Insulin: 34 mL    Nitroglycerin: 60 mL    Oral Fluid: 710 mL  Total IN: 960 mL    OUT:    Voided (mL): 730 mL  Total OUT: 730 mL    Total NET: 230 mL        ============================ LABS =========================                        14.2   10.72 )-----------( 250      ( 10 Mar 2021 03:57 )             43.2     03-10    135  |  102  |  18  ----------------------------<  315<H>  4.4   |  22  |  0.93    Ca    9.2      10 Mar 2021 03:57  Phos  2.8     03-10  Mg     2.2     03-10    TPro  6.7  /  Alb  3.8  /  TBili  0.5  /  DBili  x   /  AST  26  /  ALT  33  /  AlkPhos  106  03-10    Troponin T, High Sensitivity Result: 51 ng/L (03-09-21 @ 05:15)  Troponin T, High Sensitivity Result: 43 ng/L (03-09-21 @ 02:12)  Troponin T, High Sensitivity Result: 36 ng/L (03-08-21 @ 21:31)    CKMB Units: 2.4 ng/mL (03-09-21 @ 05:15)  CKMB Units: 2.5 ng/mL (03-09-21 @ 02:12)    Creatine Kinase, Serum: 118 U/L (03-09-21 @ 05:15)  Creatine Kinase, Serum: 127 U/L (03-09-21 @ 02:12)    CPK Mass Assay %: 2.0 % (03-09-21 @ 05:15)  CPK Mass Assay %: 2.0 % (03-09-21 @ 02:12)      P2Y12 Plt Reactivity: 192 PRU (03-10-21 @ 16:21)  P2Y12 Plt Reactivity: 100 PRU (03-10-21 @ 03:57)    LIVER FUNCTIONS - ( 10 Mar 2021 03:57 )  Alb: 3.8 g/dL / Pro: 6.7 g/dL / ALK PHOS: 106 U/L / ALT: 33 U/L / AST: 26 U/L / GGT: x           PT/INR - ( 10 Mar 2021 19:54 )   PT: 11.8 sec;   INR: 0.98 ratio         PTT - ( 10 Mar 2021 19:54 )  PTT:59.7 sec        ======================Micro/Rad/Cardio=================  Telemetry: Reviewed   EKG: Reviewed  CXR: Reviewed  Echo: Reviewed  Cath: Reviewed  ======================================================  PAST MEDICAL & SURGICAL HISTORY:  Chronic pancreatitis    Hypothyroidism    HLD (hyperlipidemia)    HTN (hypertension)    DM (diabetes mellitus)    No significant past surgical history      ====================ASSESSMENT ==============  S/P LHC on 3/9: Multivessel CAD  DMT2 w/ Hyperglycemia  Hypothyroidism    Plan:  ====================== NEUROLOGY=====================  A&O x3  - Nonfocal, continue to monitor neuro status per ICU protocol.   - Tylenol/Codeine for analgesia     acetaminophen 300 mG/codeine 30 mG 1 Tablet(s) Oral every 4 hours PRN Moderate Pain (4 - 6)  ==================== RESPIRATORY======================  Comfortable on room air, SpO2 96%  - Continue to monitor SpO2 via pulse oximetry  - Encourage bedside spirometry     ====================CARDIOVASCULAR==================  Multivessel CAD  - S/P ASA and Brilinta load, started on heparin gtt- Brilinta being held for CTS w/u for CABG  - S/P LHC on 3/9: oLAD 90%, mLAD 60%, OM1 70%, OM2 90%, mRCA 95%. No PCI + IABP  - Pt with active CP and HTN augmented diastolic pressures 160s   - C/w NTG gtt for CP resolution and maintain ADp > 90   - C/w Lipitor 80mg QHS w/ ASA  - Lopressor 25 BID  - c/w Hep gtt on IABP   - check TTE, pre-CABG     metoprolol tartrate 25 milliGRAM(s) Oral every 8 hours  nitroglycerin  Infusion 20 MICROgram(s)/Min (6 mL/Hr) IV Continuous <Continuous>  aspirin  chewable 81 milliGRAM(s) Oral daily  ===================HEMATOLOGIC/ONC ===================  H/H stable.   - Continue to monitor hemoglobin and hematocrit levels.     VTE prophylaxis   - Continue Heparin for venous thromboembolism prophylaxis and for IABP     heparin  Infusion. 1000 Unit(s)/Hr (10 mL/Hr) IV Continuous <Continuous>  ===================== RENAL =========================  No active issues   - Continue to monitor I/Os, BUN/Creatinine, and urine output.   - Goal net negative fluid balance. Replete lytes PRN. Keep K> 4 and Mg >2.     ==================== GASTROINTESTINAL===================  Tolerating PO DASH/TLC diet.     dextrose 5%. 1000 milliLiter(s) (50 mL/Hr) IV Continuous <Continuous>  dextrose 5%. 1000 milliLiter(s) (100 mL/Hr) IV Continuous <Continuous>  =======================    ENDOCRINE  =====================  Hx of DMT2  - Glycemic control with Admelog sliding scale, Lantus, and Glucagon PRN.  - Monitor blood glucose levels.     Hypothyroidism  - C/w Synthroid  - Monitor TFTs    atorvastatin 80 milliGRAM(s) Oral at bedtime  dextrose 40% Gel 15 Gram(s) Oral once  dextrose 50% Injectable 50 milliLiter(s) IV Push every 15 minutes  glucagon  Injectable 1 milliGRAM(s) IntraMuscular once  insulin glargine Injectable (LANTUS) 40 Unit(s) SubCutaneous at bedtime  insulin lispro (ADMELOG) corrective regimen sliding scale   SubCutaneous three times a day before meals  insulin lispro (ADMELOG) corrective regimen sliding scale   SubCutaneous at bedtime  insulin lispro Injectable (ADMELOG) 10 Unit(s) SubCutaneous three times a day before meals  levothyroxine 100 MICROGram(s) Oral daily  ========================INFECTIOUS DISEASE================  Afebrile, WBC within normal limits.   - Monitor temperature and trend WBC. Monitor off abx.       Patient requires continuous monitoring with bedside rhythm monitoring, pulse ox monitoring, and intermittent blood gas analysis. Care plan discussed with ICU care team. Patient remained critical and at risk for life threatening decompensation.  Patient seen, examined and plan discussed with CCU team during rounds.     I have personally provided 35 minutes of critical care time excluding time spent on separate procedures.    By signing my name below, I, Landy Bryant, attest that this documentation has been prepared under the direction and in the presence of KRISTINE Coulter.  Electronically signed: Alonso Duarte, 03-10-21 @ 20:40    I, KRISTINE Coulter, personally performed the services described in this documentation. all medical record entries made by the behzadibjosh were at my direction and in my presence. I have reviewed the chart and agree that the record reflects my personal performance and is accurate and complete  Electronically signed: KRISTINE Coulter.     SELENE BE  MRN-[id number]  Patient is a 65y old  Male who presents with a chief complaint of chest pain (10 Mar 2021 07:12)    HPI:  65M with PMH of HTN, T2DM, HLD, hypothyroidism, chronic pancreatitis p/w chest pain. Pt states he has been having CP for past 2 weeks - pain was initially only with exertion, located midsternal/epigastric region, burning pain with occasional radiation to the jaw and associated with DUARTE. Pain would improve after about 5 minutes of rest. However, in past few days pt has started having pain even at rest. Denies any associated nausea/vomiting, diaphoresis, palpitations, syncope/lightheadedness. Pt went to see cardiologist for these symptoms and TTE showed inferolateral wall hypokinesis and referred to ED for possible cath. Pt had a normal stress test 2 years ago for pore-op for RLE arthrocentesis. ROS positive for chronic abd pain 2/2 chronic pancreatitis. Prior smoker, quit 1992.     Pt does not know home medications.  (09 Mar 2021 02:17)      Hospital Course:  3/9 Admitted to CICU for S/P LHC: multivessel CAD w/ IABP     24 HOUR EVENTS:  Insulin gtt transitioned to lantus and admelog   surg consulted for abd pain (patient states feels like a flare of his chronic pancreatitis)- CT with contrast ordered  Increased lopressore 25 to 50 tid  Start hydral 10 tid  Dilaudid 0.5 given for pain x2, zofran x1 given for nausea     REVIEW OF SYSTEMS:   Constitutional: No weakness, fevers, or chills  Eyes/ENT: No visual changes  Respiratory: No cough, wheezing, hemoptysis  Cardiovascular: No chest pain, no palpitations  Gastrointestinal: No abdominal pain. No nausea, vomiting, hematemesis.   Genitourinary: No dysuria  Neurological: No numbness, no weakness  Skin: No itching, rashes    ICU Vital Signs Last 24 Hrs  T(C): 36.8 (10 Mar 2021 19:00), Max: 37 (10 Mar 2021 05:00)  T(F): 98.2 (10 Mar 2021 19:00), Max: 98.6 (10 Mar 2021 05:00)  HR: 92 (10 Mar 2021 20:00) (66 - 92)  RR: 29 (10 Mar 2021 20:00) (10 - 29)  SpO2: 94% (10 Mar 2021 20:00) (91% - 97%)      I&O's Summary    09 Mar 2021 07:01  -  10 Mar 2021 07:00  --------------------------------------------------------  IN: 228 mL / OUT: 275 mL / NET: -47 mL    10 Mar 2021 07:01  -  10 Mar 2021 20:40  --------------------------------------------------------  IN: 960 mL / OUT: 730 mL / NET: 230 mL      POCT Blood Glucose.: 144 mg/dL (10 Mar 2021 20:03)      PHYSICAL EXAM:   General: No acute distress  Eyes: EOMI, PERRLA, conjunctiva and sclera clear  Chest/Lung: CTAB, no wheezes, rales, or rhonchi  Heart: Regular rate, regular rhythm. Normal S1/S2. No murmurs, rubs, or gallops.  Abdomen: Soft, mildly tender to palpation in epigastric and RUQ, nondistended. Normal bowel sounds.  Extremites: 2+ peripheral pulses B/L. No clubbing, cyanosis, or edema.  Neurology: A&O x3, no focal deficits  Skin: No rashes or lesions  Lines: L Fem A. IABP 3/9 c/d/i no hematoma     ============================I/O===========================   I&O's Detail    09 Mar 2021 07:01  -  10 Mar 2021 07:00  --------------------------------------------------------  IN:    Heparin Infusion: 120 mL    Nitroglycerin: 108 mL  Total IN: 228 mL    OUT:    Voided (mL): 275 mL  Total OUT: 275 mL    Total NET: -47 mL      10 Mar 2021 07:01  -  10 Mar 2021 20:40  --------------------------------------------------------  IN:    Heparin Infusion: 156 mL    Insulin: 34 mL    Nitroglycerin: 60 mL    Oral Fluid: 710 mL  Total IN: 960 mL    OUT:    Voided (mL): 730 mL  Total OUT: 730 mL    Total NET: 230 mL        ============================ LABS =========================                        14.2   10.72 )-----------( 250      ( 10 Mar 2021 03:57 )             43.2     03-10    135  |  102  |  18  ----------------------------<  315<H>  4.4   |  22  |  0.93    Ca    9.2      10 Mar 2021 03:57  Phos  2.8     03-10  Mg     2.2     03-10    TPro  6.7  /  Alb  3.8  /  TBili  0.5  /  DBili  x   /  AST  26  /  ALT  33  /  AlkPhos  106  03-10    Troponin T, High Sensitivity Result: 51 ng/L (03-09-21 @ 05:15)  Troponin T, High Sensitivity Result: 43 ng/L (03-09-21 @ 02:12)  Troponin T, High Sensitivity Result: 36 ng/L (03-08-21 @ 21:31)    CKMB Units: 2.4 ng/mL (03-09-21 @ 05:15)  CKMB Units: 2.5 ng/mL (03-09-21 @ 02:12)    Creatine Kinase, Serum: 118 U/L (03-09-21 @ 05:15)  Creatine Kinase, Serum: 127 U/L (03-09-21 @ 02:12)    CPK Mass Assay %: 2.0 % (03-09-21 @ 05:15)  CPK Mass Assay %: 2.0 % (03-09-21 @ 02:12)      P2Y12 Plt Reactivity: 192 PRU (03-10-21 @ 16:21)  P2Y12 Plt Reactivity: 100 PRU (03-10-21 @ 03:57)    LIVER FUNCTIONS - ( 10 Mar 2021 03:57 )  Alb: 3.8 g/dL / Pro: 6.7 g/dL / ALK PHOS: 106 U/L / ALT: 33 U/L / AST: 26 U/L / GGT: x           PT/INR - ( 10 Mar 2021 19:54 )   PT: 11.8 sec;   INR: 0.98 ratio         PTT - ( 10 Mar 2021 19:54 )  PTT:59.7 sec        ======================Micro/Rad/Cardio=================  Telemetry: Reviewed   EKG: Reviewed  CXR: Reviewed  Echo: Reviewed  Cath: Reviewed  ======================================================  PAST MEDICAL & SURGICAL HISTORY:  Chronic pancreatitis    Hypothyroidism    HLD (hyperlipidemia)    HTN (hypertension)    DM (diabetes mellitus)    No significant past surgical history      ====================ASSESSMENT ==============  S/P LHC on 3/9: Multivessel CAD  DMT2 w/ Hyperglycemia  Hypothyroidism    Plan:  ====================== NEUROLOGY=====================  A&O x3  - Nonfocal, continue to monitor neuro status per ICU protocol.   - Dilaudid x2 given for pain with relief of symptoms     acetaminophen 300 mG/codeine 30 mG 1 Tablet(s) Oral every 4 hours PRN Moderate Pain (4 - 6)  ==================== RESPIRATORY======================  Comfortable on room air, SpO2 96%  - Continue to monitor SpO2 via pulse oximetry  - Encourage bedside spirometry     ====================CARDIOVASCULAR==================  Multivessel CAD  - S/P ASA and Brilinta load, started on heparin gtt- Brilinta being held for CTS w/u for CABG  - S/P LHC on 3/9: oLAD 90%, mLAD 60%, OM1 70%, OM2 90%, mRCA 95%. No PCI + IABP  - Pt with active CP and HTN augmented diastolic pressures 110s-120s  - C/w NTG gtt for CP resolution and maintain ADp > 90   - C/w Lipitor 80mg QHS w/ ASA  - c/w Lopressor 50 tid  - start hydral 10 tid for afterload reduction   - c/w Hep gtt on IABP   - 3/10 TTE: EF 55-60%. Basal inferolat wall hypokinesis. No effusion, no lv thrombus.     metoprolol tartrate 25 milliGRAM(s) Oral every 8 hours  nitroglycerin  Infusion 20 MICROgram(s)/Min (6 mL/Hr) IV Continuous <Continuous>  aspirin  chewable 81 milliGRAM(s) Oral daily  ===================HEMATOLOGIC/ONC ===================  H/H stable.   - Continue to monitor hemoglobin and hematocrit levels.     VTE prophylaxis   - Continue Heparin for venous thromboembolism prophylaxis and for IABP     heparin  Infusion. 1000 Unit(s)/Hr (10 mL/Hr) IV Continuous <Continuous>  ===================== RENAL =========================  No active issues   - Continue to monitor I/Os, BUN/Creatinine, and urine output.   - Goal net negative fluid balance. Replete lytes PRN. Keep K> 4 and Mg >2.     ==================== GASTROINTESTINAL===================  Tolerating PO DASH/TLC diet.     Chronic Pancreatitis   - Patient states that he has had chronic pancreatitis for several years and has been hospitalized multiple times for pain relief. He currently has abd pain which he states feels the same as when he has a flair of his chronic pancreatitis. He states he has a cholecystectomy and "stone removal" from pancreas back in 1992  - Lipase wnl- 24  - WBC elevated at 15  - CT Abd/Pel w/ IV contrast ordered as per gen surg recs- pending those results reconsult as needed  - consider bowel rest if continues to be symptomatic     dextrose 5%. 1000 milliLiter(s) (50 mL/Hr) IV Continuous <Continuous>  dextrose 5%. 1000 milliLiter(s) (100 mL/Hr) IV Continuous <Continuous>  =======================    ENDOCRINE  =====================  Hx of DMT2  - Glycemic control with Admelog sliding scale, Lantus 40U, Admelog Premeals 10U tid, and Glucagon PRN.  - Monitor blood glucose levels.     Hypothyroidism  - C/w Synthroid  - Monitor TFTs    atorvastatin 80 milliGRAM(s) Oral at bedtime  dextrose 40% Gel 15 Gram(s) Oral once  dextrose 50% Injectable 50 milliLiter(s) IV Push every 15 minutes  glucagon  Injectable 1 milliGRAM(s) IntraMuscular once  insulin glargine Injectable (LANTUS) 40 Unit(s) SubCutaneous at bedtime  insulin lispro (ADMELOG) corrective regimen sliding scale   SubCutaneous three times a day before meals  insulin lispro (ADMELOG) corrective regimen sliding scale   SubCutaneous at bedtime  insulin lispro Injectable (ADMELOG) 10 Unit(s) SubCutaneous three times a day before meals  levothyroxine 100 MICROGram(s) Oral daily  ========================INFECTIOUS DISEASE================  Afebrile, WBC within normal limits.   - Monitor temperature and trend WBC. Monitor off abx at this time.       Patient requires continuous monitoring with bedside rhythm monitoring, pulse ox monitoring, and intermittent blood gas analysis. Care plan discussed with ICU care team. Patient remained critical and at risk for life threatening decompensation.  Patient seen, examined and plan discussed with CCU team during rounds.     I have personally provided 35 minutes of critical care time excluding time spent on separate procedures.    By signing my name below, I, Landy Bryant, attest that this documentation has been prepared under the direction and in the presence of KRISTINE Coulter.  Electronically signed: Alonso Duarte, 03-10-21 @ 20:40    I, KRISTINE Coulter, personally performed the services described in this documentation. all medical record entries made by the behzadibjosh were at my direction and in my presence. I have reviewed the chart and agree that the record reflects my personal performance and is accurate and complete  Electronically signed: KRISTINE Coulter.

## 2021-03-10 NOTE — PROGRESS NOTE ADULT - SUBJECTIVE AND OBJECTIVE BOX
Patient is a 65y old  Male who presents with a chief complaint of chest pain (10 Mar 2021 20:40)      SUBJECTIVE / OVERNIGHT EVENTS: s/p cath with multiple vessels disease for CABG. Transferred to CCU.  Review of Systems  chest pain yes  palpitations no  sob no  nausea no  headache no    MEDICATIONS  (STANDING):  aspirin  chewable 81 milliGRAM(s) Oral daily  atorvastatin 80 milliGRAM(s) Oral at bedtime  chlorhexidine 2% Cloths 1 Application(s) Topical <User Schedule>  dextrose 40% Gel 15 Gram(s) Oral once  dextrose 5%. 1000 milliLiter(s) (50 mL/Hr) IV Continuous <Continuous>  dextrose 5%. 1000 milliLiter(s) (100 mL/Hr) IV Continuous <Continuous>  dextrose 50% Injectable 50 milliLiter(s) IV Push every 15 minutes  glucagon  Injectable 1 milliGRAM(s) IntraMuscular once  heparin  Infusion. 1000 Unit(s)/Hr (10 mL/Hr) IV Continuous <Continuous>  insulin glargine Injectable (LANTUS) 40 Unit(s) SubCutaneous at bedtime  insulin lispro (ADMELOG) corrective regimen sliding scale   SubCutaneous three times a day before meals  insulin lispro (ADMELOG) corrective regimen sliding scale   SubCutaneous at bedtime  insulin lispro Injectable (ADMELOG) 10 Unit(s) SubCutaneous three times a day before meals  latanoprost 0.005% Ophthalmic Solution 1 Drop(s) Both EYES at bedtime  levothyroxine 100 MICROGram(s) Oral daily  metoprolol tartrate 25 milliGRAM(s) Oral every 8 hours  nitroglycerin  Infusion 20 MICROgram(s)/Min (6 mL/Hr) IV Continuous <Continuous>    MEDICATIONS  (PRN):  acetaminophen 300 mG/codeine 30 mG 1 Tablet(s) Oral every 4 hours PRN Moderate Pain (4 - 6)  BACItracin   Ointment 1 Application(s) Topical every 12 hours PRN Pain/itching      Vital Signs Last 24 Hrs  T(C): 36.8 (10 Mar 2021 19:00), Max: 37 (10 Mar 2021 05:00)  T(F): 98.2 (10 Mar 2021 19:00), Max: 98.6 (10 Mar 2021 05:00)  HR: 86 (10 Mar 2021 21:00) (66 - 92)  BP: --  BP(mean): --  RR: 19 (10 Mar 2021 21:00) (10 - 29)  SpO2: 96% (10 Mar 2021 21:00) (91% - 97%)    PHYSICAL EXAM:  GENERAL: NAD, well-developed  HEAD:  Atraumatic, Normocephalic  EYES: EOMI, PERRLA, conjunctiva and sclera clear  NECK: Supple, No JVD  CHEST/LUNG: Clear to auscultation bilaterally; No wheeze  HEART: Regular rate and rhythm; No murmurs, rubs, or gallops  ABDOMEN: Soft, Nontender, Nondistended; Bowel sounds present  EXTREMITIES:  2+ Peripheral Pulses, No clubbing, cyanosis, or edema  PSYCH: AAOx3  NEUROLOGY: non-focal  SKIN: No rashes or lesions    LABS:                        14.2   10.72 )-----------( 250      ( 10 Mar 2021 03:57 )             43.2     03-10    135  |  102  |  18  ----------------------------<  315<H>  4.4   |  22  |  0.93    Ca    9.2      10 Mar 2021 03:57  Phos  2.8     03-10  Mg     2.2     03-10    TPro  6.7  /  Alb  3.8  /  TBili  0.5  /  DBili  x   /  AST  26  /  ALT  33  /  AlkPhos  106  03-10    PT/INR - ( 10 Mar 2021 19:54 )   PT: 11.8 sec;   INR: 0.98 ratio         PTT - ( 10 Mar 2021 19:54 )  PTT:59.7 sec  CARDIAC MARKERS ( 09 Mar 2021 05:15 )  x     / x     / 118 U/L / x     / 2.4 ng/mL  CARDIAC MARKERS ( 09 Mar 2021 02:12 )  x     / x     / 127 U/L / x     / 2.5 ng/mL            RADIOLOGY & ADDITIONAL TESTS:    Imaging Personally Reviewed:    Consultant(s) Notes Reviewed:      Care Discussed with Consultants/Other Providers:

## 2021-03-11 ENCOUNTER — TRANSCRIPTION ENCOUNTER (OUTPATIENT)
Age: 66
End: 2021-03-11

## 2021-03-11 LAB
ALBUMIN SERPL ELPH-MCNC: 4.2 G/DL — SIGNIFICANT CHANGE UP (ref 3.3–5)
ALP SERPL-CCNC: 119 U/L — SIGNIFICANT CHANGE UP (ref 40–120)
ALT FLD-CCNC: 36 U/L — SIGNIFICANT CHANGE UP (ref 10–45)
ANION GAP SERPL CALC-SCNC: 13 MMOL/L — SIGNIFICANT CHANGE UP (ref 5–17)
APTT BLD: 61.7 SEC — HIGH (ref 27.5–35.5)
AST SERPL-CCNC: 28 U/L — SIGNIFICANT CHANGE UP (ref 10–40)
BASOPHILS # BLD AUTO: 0.07 K/UL — SIGNIFICANT CHANGE UP (ref 0–0.2)
BASOPHILS NFR BLD AUTO: 0.5 % — SIGNIFICANT CHANGE UP (ref 0–2)
BILIRUB SERPL-MCNC: 0.6 MG/DL — SIGNIFICANT CHANGE UP (ref 0.2–1.2)
BLD GP AB SCN SERPL QL: NEGATIVE — SIGNIFICANT CHANGE UP
BUN SERPL-MCNC: 21 MG/DL — SIGNIFICANT CHANGE UP (ref 7–23)
CALCIUM SERPL-MCNC: 9.8 MG/DL — SIGNIFICANT CHANGE UP (ref 8.4–10.5)
CHLORIDE SERPL-SCNC: 102 MMOL/L — SIGNIFICANT CHANGE UP (ref 96–108)
CO2 SERPL-SCNC: 27 MMOL/L — SIGNIFICANT CHANGE UP (ref 22–31)
CREAT SERPL-MCNC: 1.12 MG/DL — SIGNIFICANT CHANGE UP (ref 0.5–1.3)
EOSINOPHIL # BLD AUTO: 0.18 K/UL — SIGNIFICANT CHANGE UP (ref 0–0.5)
EOSINOPHIL NFR BLD AUTO: 1.2 % — SIGNIFICANT CHANGE UP (ref 0–6)
GLUCOSE BLDC GLUCOMTR-MCNC: 120 MG/DL — HIGH (ref 70–99)
GLUCOSE BLDC GLUCOMTR-MCNC: 127 MG/DL — HIGH (ref 70–99)
GLUCOSE BLDC GLUCOMTR-MCNC: 159 MG/DL — HIGH (ref 70–99)
GLUCOSE BLDC GLUCOMTR-MCNC: 161 MG/DL — HIGH (ref 70–99)
GLUCOSE SERPL-MCNC: 125 MG/DL — HIGH (ref 70–99)
HCT VFR BLD CALC: 46.5 % — SIGNIFICANT CHANGE UP (ref 39–50)
HCT VFR BLD CALC: 48.1 % — SIGNIFICANT CHANGE UP (ref 39–50)
HGB BLD-MCNC: 14.8 G/DL — SIGNIFICANT CHANGE UP (ref 13–17)
HGB BLD-MCNC: 15.3 G/DL — SIGNIFICANT CHANGE UP (ref 13–17)
IMM GRANULOCYTES NFR BLD AUTO: 0.5 % — SIGNIFICANT CHANGE UP (ref 0–1.5)
INR BLD: 1.01 RATIO — SIGNIFICANT CHANGE UP (ref 0.88–1.16)
LACTATE BLDV-MCNC: 0.9 MMOL/L — SIGNIFICANT CHANGE UP (ref 0.7–2)
LYMPHOCYTES # BLD AUTO: 26.2 % — SIGNIFICANT CHANGE UP (ref 13–44)
LYMPHOCYTES # BLD AUTO: 3.93 K/UL — HIGH (ref 1–3.3)
MAGNESIUM SERPL-MCNC: 2.4 MG/DL — SIGNIFICANT CHANGE UP (ref 1.6–2.6)
MCHC RBC-ENTMCNC: 27.1 PG — SIGNIFICANT CHANGE UP (ref 27–34)
MCHC RBC-ENTMCNC: 27.5 PG — SIGNIFICANT CHANGE UP (ref 27–34)
MCHC RBC-ENTMCNC: 31.8 GM/DL — LOW (ref 32–36)
MCHC RBC-ENTMCNC: 31.8 GM/DL — LOW (ref 32–36)
MCV RBC AUTO: 85.1 FL — SIGNIFICANT CHANGE UP (ref 80–100)
MCV RBC AUTO: 86.3 FL — SIGNIFICANT CHANGE UP (ref 80–100)
MONOCYTES # BLD AUTO: 1.14 K/UL — HIGH (ref 0–0.9)
MONOCYTES NFR BLD AUTO: 7.6 % — SIGNIFICANT CHANGE UP (ref 2–14)
MRSA PCR RESULT.: SIGNIFICANT CHANGE UP
NEUTROPHILS # BLD AUTO: 9.63 K/UL — HIGH (ref 1.8–7.4)
NEUTROPHILS NFR BLD AUTO: 64 % — SIGNIFICANT CHANGE UP (ref 43–77)
NRBC # BLD: 0 /100 WBCS — SIGNIFICANT CHANGE UP (ref 0–0)
NRBC # BLD: 0 /100 WBCS — SIGNIFICANT CHANGE UP (ref 0–0)
PA ADP PRP-ACNC: 190 PRU — LOW (ref 194–417)
PHOSPHATE SERPL-MCNC: 3.7 MG/DL — SIGNIFICANT CHANGE UP (ref 2.5–4.5)
PLATELET # BLD AUTO: 211 K/UL — SIGNIFICANT CHANGE UP (ref 150–400)
PLATELET # BLD AUTO: 262 K/UL — SIGNIFICANT CHANGE UP (ref 150–400)
POTASSIUM SERPL-MCNC: 3.9 MMOL/L — SIGNIFICANT CHANGE UP (ref 3.5–5.3)
POTASSIUM SERPL-SCNC: 3.9 MMOL/L — SIGNIFICANT CHANGE UP (ref 3.5–5.3)
PROT SERPL-MCNC: 7.5 G/DL — SIGNIFICANT CHANGE UP (ref 6–8.3)
PROTHROM AB SERPL-ACNC: 12.1 SEC — SIGNIFICANT CHANGE UP (ref 10.6–13.6)
RBC # BLD: 5.39 M/UL — SIGNIFICANT CHANGE UP (ref 4.2–5.8)
RBC # BLD: 5.65 M/UL — SIGNIFICANT CHANGE UP (ref 4.2–5.8)
RBC # FLD: 13.8 % — SIGNIFICANT CHANGE UP (ref 10.3–14.5)
RBC # FLD: 13.9 % — SIGNIFICANT CHANGE UP (ref 10.3–14.5)
RH IG SCN BLD-IMP: POSITIVE — SIGNIFICANT CHANGE UP
S AUREUS DNA NOSE QL NAA+PROBE: DETECTED
SARS-COV-2 RNA SPEC QL NAA+PROBE: SIGNIFICANT CHANGE UP
SODIUM SERPL-SCNC: 142 MMOL/L — SIGNIFICANT CHANGE UP (ref 135–145)
WBC # BLD: 14.39 K/UL — HIGH (ref 3.8–10.5)
WBC # BLD: 15.02 K/UL — HIGH (ref 3.8–10.5)
WBC # FLD AUTO: 14.39 K/UL — HIGH (ref 3.8–10.5)
WBC # FLD AUTO: 15.02 K/UL — HIGH (ref 3.8–10.5)

## 2021-03-11 PROCEDURE — 71045 X-RAY EXAM CHEST 1 VIEW: CPT | Mod: 26

## 2021-03-11 PROCEDURE — 99291 CRITICAL CARE FIRST HOUR: CPT | Mod: 25

## 2021-03-11 PROCEDURE — 99222 1ST HOSP IP/OBS MODERATE 55: CPT

## 2021-03-11 PROCEDURE — 74177 CT ABD & PELVIS W/CONTRAST: CPT | Mod: 26

## 2021-03-11 PROCEDURE — 93010 ELECTROCARDIOGRAM REPORT: CPT

## 2021-03-11 PROCEDURE — 71045 X-RAY EXAM CHEST 1 VIEW: CPT | Mod: 26,77

## 2021-03-11 PROCEDURE — 99292 CRITICAL CARE ADDL 30 MIN: CPT | Mod: 25

## 2021-03-11 PROCEDURE — 99291 CRITICAL CARE FIRST HOUR: CPT

## 2021-03-11 RX ORDER — HYDRALAZINE HCL 50 MG
10 TABLET ORAL ONCE
Refills: 0 | Status: COMPLETED | OUTPATIENT
Start: 2021-03-11 | End: 2021-03-11

## 2021-03-11 RX ORDER — MUPIROCIN 20 MG/G
1 OINTMENT TOPICAL
Refills: 0 | Status: DISCONTINUED | OUTPATIENT
Start: 2021-03-11 | End: 2021-03-12

## 2021-03-11 RX ORDER — METOPROLOL TARTRATE 50 MG
50 TABLET ORAL THREE TIMES A DAY
Refills: 0 | Status: DISCONTINUED | OUTPATIENT
Start: 2021-03-11 | End: 2021-03-12

## 2021-03-11 RX ORDER — INSULIN GLARGINE 100 [IU]/ML
20 INJECTION, SOLUTION SUBCUTANEOUS ONCE
Refills: 0 | Status: COMPLETED | OUTPATIENT
Start: 2021-03-11 | End: 2021-03-11

## 2021-03-11 RX ORDER — CEFUROXIME AXETIL 250 MG
1500 TABLET ORAL ONCE
Refills: 0 | Status: DISCONTINUED | OUTPATIENT
Start: 2021-03-11 | End: 2021-03-12

## 2021-03-11 RX ORDER — CHLORHEXIDINE GLUCONATE 213 G/1000ML
1 SOLUTION TOPICAL ONCE
Refills: 0 | Status: COMPLETED | OUTPATIENT
Start: 2021-03-11 | End: 2021-03-11

## 2021-03-11 RX ORDER — ONDANSETRON 8 MG/1
4 TABLET, FILM COATED ORAL ONCE
Refills: 0 | Status: COMPLETED | OUTPATIENT
Start: 2021-03-11 | End: 2021-03-11

## 2021-03-11 RX ORDER — HYDRALAZINE HCL 50 MG
25 TABLET ORAL THREE TIMES A DAY
Refills: 0 | Status: DISCONTINUED | OUTPATIENT
Start: 2021-03-11 | End: 2021-03-12

## 2021-03-11 RX ORDER — CHLORHEXIDINE GLUCONATE 213 G/1000ML
30 SOLUTION TOPICAL ONCE
Refills: 0 | Status: COMPLETED | OUTPATIENT
Start: 2021-03-11 | End: 2021-03-12

## 2021-03-11 RX ORDER — HYDROMORPHONE HYDROCHLORIDE 2 MG/ML
0.5 INJECTION INTRAMUSCULAR; INTRAVENOUS; SUBCUTANEOUS ONCE
Refills: 0 | Status: DISCONTINUED | OUTPATIENT
Start: 2021-03-11 | End: 2021-03-11

## 2021-03-11 RX ORDER — HYDRALAZINE HCL 50 MG
10 TABLET ORAL THREE TIMES A DAY
Refills: 0 | Status: DISCONTINUED | OUTPATIENT
Start: 2021-03-11 | End: 2021-03-11

## 2021-03-11 RX ORDER — POTASSIUM CHLORIDE 20 MEQ
10 PACKET (EA) ORAL DAILY
Refills: 0 | Status: DISCONTINUED | OUTPATIENT
Start: 2021-03-11 | End: 2021-03-12

## 2021-03-11 RX ADMIN — Medication 10 MILLIGRAM(S): at 10:08

## 2021-03-11 RX ADMIN — MUPIROCIN 1 APPLICATION(S): 20 OINTMENT TOPICAL at 17:48

## 2021-03-11 RX ADMIN — Medication 10 MILLIEQUIVALENT(S): at 05:03

## 2021-03-11 RX ADMIN — Medication 1 TABLET(S): at 14:07

## 2021-03-11 RX ADMIN — Medication 1 TABLET(S): at 22:07

## 2021-03-11 RX ADMIN — ONDANSETRON 4 MILLIGRAM(S): 8 TABLET, FILM COATED ORAL at 01:38

## 2021-03-11 RX ADMIN — CHLORHEXIDINE GLUCONATE 1 APPLICATION(S): 213 SOLUTION TOPICAL at 21:22

## 2021-03-11 RX ADMIN — Medication 100 MICROGRAM(S): at 05:03

## 2021-03-11 RX ADMIN — Medication 1 TABLET(S): at 18:18

## 2021-03-11 RX ADMIN — CHLORHEXIDINE GLUCONATE 1 APPLICATION(S): 213 SOLUTION TOPICAL at 05:03

## 2021-03-11 RX ADMIN — Medication 1: at 09:13

## 2021-03-11 RX ADMIN — Medication 10 MILLIGRAM(S): at 12:38

## 2021-03-11 RX ADMIN — LATANOPROST 1 DROP(S): 0.05 SOLUTION/ DROPS OPHTHALMIC; TOPICAL at 21:37

## 2021-03-11 RX ADMIN — INSULIN GLARGINE 20 UNIT(S): 100 INJECTION, SOLUTION SUBCUTANEOUS at 22:10

## 2021-03-11 RX ADMIN — Medication 1 TABLET(S): at 09:42

## 2021-03-11 RX ADMIN — Medication 1 TABLET(S): at 17:48

## 2021-03-11 RX ADMIN — Medication 50 MILLIGRAM(S): at 21:22

## 2021-03-11 RX ADMIN — Medication 1 TABLET(S): at 09:12

## 2021-03-11 RX ADMIN — Medication 10 UNIT(S): at 09:13

## 2021-03-11 RX ADMIN — HYDROMORPHONE HYDROCHLORIDE 0.5 MILLIGRAM(S): 2 INJECTION INTRAMUSCULAR; INTRAVENOUS; SUBCUTANEOUS at 01:39

## 2021-03-11 RX ADMIN — Medication 50 MILLIGRAM(S): at 13:45

## 2021-03-11 RX ADMIN — Medication 1 TABLET(S): at 21:37

## 2021-03-11 RX ADMIN — Medication 10 MILLIGRAM(S): at 05:03

## 2021-03-11 RX ADMIN — HYDROMORPHONE HYDROCHLORIDE 0.5 MILLIGRAM(S): 2 INJECTION INTRAMUSCULAR; INTRAVENOUS; SUBCUTANEOUS at 01:55

## 2021-03-11 RX ADMIN — ATORVASTATIN CALCIUM 80 MILLIGRAM(S): 80 TABLET, FILM COATED ORAL at 21:21

## 2021-03-11 RX ADMIN — Medication 25 MILLIGRAM(S): at 13:45

## 2021-03-11 RX ADMIN — Medication 1 TABLET(S): at 13:37

## 2021-03-11 RX ADMIN — HEPARIN SODIUM 1200 UNIT(S)/HR: 5000 INJECTION INTRAVENOUS; SUBCUTANEOUS at 01:38

## 2021-03-11 RX ADMIN — Medication 10 UNIT(S): at 14:23

## 2021-03-11 RX ADMIN — Medication 50 MILLIGRAM(S): at 05:03

## 2021-03-11 RX ADMIN — Medication 25 MILLIGRAM(S): at 21:22

## 2021-03-11 RX ADMIN — Medication 81 MILLIGRAM(S): at 12:38

## 2021-03-11 NOTE — PROGRESS NOTE ADULT - ASSESSMENT
A/P: 65M with PMH of HTN, T2DM, HLD, hypothyroidism, chronic pancreatitis p/w NSTEMI s/p LHC revealing TVD pending CABG c/b persistent chest pain requiring IABP.    Plan:  ====================== NEUROLOGY=====================  A&O x3  - Nonfocal, continue to monitor neuro status per ICU protocol.   - Dilaudid x2 given for pain with relief of symptoms     acetaminophen 300 mG/codeine 30 mG 1 Tablet(s) Oral every 4 hours PRN Moderate Pain (4 - 6)  ==================== RESPIRATORY======================  Comfortable on room air, SpO2 96%  - Continue to monitor SpO2 via pulse oximetry  - Encourage bedside spirometry     ====================CARDIOVASCULAR==================  Multivessel CAD  - S/P ASA and Brilinta load, started on heparin gtt- Brilinta being held for CTS w/u for CABG  - S/P LHC on 3/9: oLAD 90%, mLAD 60%, OM1 70%, OM2 90%, mRCA 95%. No PCI + IABP  - Pt with active CP and HTN augmented diastolic pressures 110s-120s  - C/w NTG gtt for CP resolution and maintain ADp > 90   - C/w Lipitor 80mg QHS w/ ASA  - c/w Lopressor 50 tid  - start hydral 10 tid for afterload reduction   - c/w Hep gtt on IABP   - 3/10 TTE: EF 55-60%. Basal inferolat wall hypokinesis. No effusion, no lv thrombus.     metoprolol tartrate 25 milliGRAM(s) Oral every 8 hours  nitroglycerin  Infusion 20 MICROgram(s)/Min (6 mL/Hr) IV Continuous <Continuous>  aspirin  chewable 81 milliGRAM(s) Oral daily  ===================HEMATOLOGIC/ONC ===================  H/H stable.   - Continue to monitor hemoglobin and hematocrit levels.     VTE prophylaxis   - Continue Heparin for venous thromboembolism prophylaxis and for IABP     heparin  Infusion. 1000 Unit(s)/Hr (10 mL/Hr) IV Continuous <Continuous>  ===================== RENAL =========================  No active issues   - Continue to monitor I/Os, BUN/Creatinine, and urine output.   - Goal net negative fluid balance. Replete lytes PRN. Keep K> 4 and Mg >2.     ==================== GASTROINTESTINAL===================  Tolerating PO DASH/TLC diet.     Chronic Pancreatitis   - Patient states that he has had chronic pancreatitis for several years and has been hospitalized multiple times for pain relief. He currently has abd pain which he states feels the same as when he has a flair of his chronic pancreatitis. He states he has a cholecystectomy and "stone removal" from pancreas back in 1992  - Lipase wnl- 24  - WBC elevated at 15  - CT Abd/Pel w/ IV contrast ordered as per gen surg recs- pending those results reconsult as needed  - consider bowel rest if continues to be symptomatic     dextrose 5%. 1000 milliLiter(s) (50 mL/Hr) IV Continuous <Continuous>  dextrose 5%. 1000 milliLiter(s) (100 mL/Hr) IV Continuous <Continuous>  =======================    ENDOCRINE  =====================  Hx of DMT2  - Glycemic control with Admelog sliding scale, Lantus 40U, Admelog Premeals 10U tid, and Glucagon PRN.  - Monitor blood glucose levels.     Hypothyroidism  - C/w Synthroid  - Monitor TFTs  ========================INFECTIOUS DISEASE================  Afebrile, WBC within normal limits.   - Monitor temperature and trend WBC. Monitor off abx at this time.    Assessment: 65M with PMH of HTN, T2DM, HLD, hypothyroidism, chronic pancreatitis p/w NSTEMI s/p LHC revealing TVD pending CABG c/b persistent chest pain requiring IABP.    Plan:  ====================== NEUROLOGY=====================  A&O x3  - Nonfocal, continue to monitor neuro status per ICU protocol.   - Dilaudid x2 given for pain with relief of symptoms     acetaminophen 300 mG/codeine 30 mG 1 Tablet(s) Oral every 4 hours PRN Moderate Pain (4 - 6)  ==================== RESPIRATORY======================  Comfortable on room air, SpO2 96%  - Continue to monitor SpO2 via pulse oximetry  - Encourage bedside spirometry     ====================CARDIOVASCULAR==================  Multivessel CAD  - S/P ASA and Brilinta load, started on heparin gtt- Brilinta being held for CTS w/u for CABG  - S/P LHC on 3/9: oLAD 90%, mLAD 60%, OM1 70%, OM2 90%, mRCA 95%. No PCI + IABP  - NTG gtt discontinued 2/2 resolution of chest pain   - C/w Lipitor 80mg QHS w/ ASA  - c/w Lopressor 50 tid  - increase hydral to 25 tid for afterload reduction given Augmented diastolic pressures 130s  - c/w Hep gtt on IABP   - 3/10 TTE: EF 55-60%. Basal inferolat wall hypokinesis. No effusion, no lv thrombus.   - OR 3/12 for CABG with Dr. Perkins  - NPO at midnight     ===================HEMATOLOGIC/ONC ===================  H/H stable.   - Continue to monitor hemoglobin and hematocrit levels.     VTE prophylaxis   - Continue Heparin for venous thromboembolism prophylaxis and for IABP     ===================== RENAL =========================  No active issues   - Continue to monitor I/Os, BUN/Creatinine, and urine output.   - Goal net negative fluid balance. Replete lytes PRN. Keep K> 4 and Mg >2.     ==================== GASTROINTESTINAL===================  Tolerating PO DASH/TLC diet.     Chronic Pancreatitis   - Patient states that he has had chronic pancreatitis for several years and has been hospitalized multiple times for pain relief. He currently has abd pain which he states feels the same as when he has a flair of his chronic pancreatitis. He states he has a cholecystectomy and "stone removal" from pancreas back in 1992  - Lipase wnl- 24  - WBC elevated at 15  - CT Abd/Pel w/ IV contrast, surgery consulted   - consider bowel rest if continues to be symptomatic     =======================    ENDOCRINE  =====================  Hx of DMT2  - Glycemic control with Admelog sliding scale, Lantus 40U, Admelog Premeals 10U tid, and Glucagon PRN.  - POCT within goal now   - Monitor blood glucose levels.     Hypothyroidism  - C/w Synthroid  - Monitor TFTs  ========================INFECTIOUS DISEASE================  Afebrile, although WBC uptrending  - Monitor temperature and trend WBC. Monitor off abx at this time.

## 2021-03-11 NOTE — PROGRESS NOTE ADULT - SUBJECTIVE AND OBJECTIVE BOX
SELENE BE  MRN-[id number]  Patient is a 65y old  Male who presents with a chief complaint of chest pain (11 Mar 2021 18:59)    HPI:  65M with PMH of HTN, T2DM, HLD, hypothyroidism, chronic pancreatitis p/w chest pain. Pt states he has been having CP for past 2 weeks - pain was initially only with exertion, located midsternal/epigastric region, burning pain with occasional radiation to the jaw and associated with DUARTE. Pain would improve after about 5 minutes of rest. However, in past few days pt has started having pain even at rest. Denies any associated nausea/vomiting, diaphoresis, palpitations, syncope/lightheadedness. Pt went to see cardiologist for these symptoms and TTE showed inferolateral wall hypokinesis and referred to ED for possible cath. Pt had a normal stress test 2 years ago for pore-op for RLE arthrocentesis. ROS positive for chronic abd pain 2/2 chronic pancreatitis. Prior smoker, quit 1992.     Pt does not know home medications.  (09 Mar 2021 02:17)      Hospital Course:  3/9 Admitted to CICU for S/P LHC: multivessel CAD w/ IABP     24 HOUR EVENTS:  - CT Abd/Pel w/ IV contrast 3/11: Suspected left paraduodenal internal hernia. Focally dilated segment of proximal jejunum appears related to prior bowel resection. Partial small bowel obstruction of the mid ileum distal to and separate from the internal hernia. No evidence of bowel ischemia. Appearance of the pancreas consistent with history of chronic pancreatitis without evidence of acute exacerbation. Distal tip of the intra-aortic balloon pump overlaps the origins of the celiac and superior mesenteric arteries. Small right common femoral artery pseudoaneurysm.    REVIEW OF SYSTEMS:   Constitutional: No weakness, fevers, or chills  Eyes/ENT: No visual changes  Respiratory: No cough, wheezing, hemoptysis  Cardiovascular: No chest pain, no palpitations  Gastrointestinal: + abdominal pain. No nausea, vomiting, hematemesis.   Genitourinary: No dysuria  Neurological: No numbness, no weakness  Skin: No itching, rashes    ICU Vital Signs Last 24 Hrs  T(C): 36.8 (11 Mar 2021 17:00), Max: 37 (11 Mar 2021 00:00)  T(F): 98.3 (11 Mar 2021 17:00), Max: 98.6 (11 Mar 2021 00:00)  HR: 60 (11 Mar 2021 19:00) (54 - 92)  BP: --  BP(mean): --  ABP: --  ABP(mean): --  RR: 18 (11 Mar 2021 19:00) (13 - 42)  SpO2: 96% (11 Mar 2021 19:00) (84% - 97%)    I&O's Summary    10 Mar 2021 07:01  -  11 Mar 2021 07:00  --------------------------------------------------------  IN: 1266 mL / OUT: 1080 mL / NET: 186 mL    11 Mar 2021 07:01  -  11 Mar 2021 19:43  --------------------------------------------------------  IN: 372 mL / OUT: 1725 mL / NET: -1353 mL    POCT Blood Glucose.: 127 mg/dL (11 Mar 2021 17:38)      PHYSICAL EXAM:   General: No acute distress  Eyes: EOMI, PERRLA, conjunctiva and sclera clear  Chest/Lung: CTAB, no wheezes, rales, or rhonchi  Heart: Regular rate, regular rhythm. Normal S1/S2. No murmurs, rubs, or gallops.  Abdomen: Soft, mildly tender to palpation in epigastric and RUQ, nondistended. Normal bowel sounds.  Extremites: 2+ peripheral pulses B/L. No clubbing, cyanosis, or edema.  Neurology: A&O x3, no focal deficits  Skin: No rashes or lesions  Lines: L Fem A. IABP 3/9 c/d/i no hematoma     ============================I/O===========================   I&O's Detail    10 Mar 2021 07:01  -  11 Mar 2021 07:00  --------------------------------------------------------  IN:    Heparin Infusion: 288 mL    Insulin: 34 mL    Nitroglycerin: 114 mL    Oral Fluid: 830 mL  Total IN: 1266 mL    OUT:    Voided (mL): 1080 mL  Total OUT: 1080 mL    Total NET: 186 mL      11 Mar 2021 07:01  -  11 Mar 2021 19:43  --------------------------------------------------------  IN:    Heparin Infusion: 120 mL    Nitroglycerin: 12 mL    Oral Fluid: 240 mL  Total IN: 372 mL    OUT:    Voided (mL): 1725 mL  Total OUT: 1725 mL    Total NET: -1353 mL        ============================ LABS =========================                        14.8   14.39 )-----------( 211      ( 11 Mar 2021 17:46 )             46.5     03-11    142  |  102  |  21  ----------------------------<  125<H>  3.9   |  27  |  1.12    Ca    9.8      11 Mar 2021 00:28  Phos  3.7     03-11  Mg     2.4     03-11    TPro  7.5  /  Alb  4.2  /  TBili  0.6  /  DBili  x   /  AST  28  /  ALT  36  /  AlkPhos  119  03-11    Troponin T, High Sensitivity Result: 51 ng/L (03-09-21 @ 05:15)  Troponin T, High Sensitivity Result: 43 ng/L (03-09-21 @ 02:12)  Troponin T, High Sensitivity Result: 36 ng/L (03-08-21 @ 21:31)    CKMB Units: 2.4 ng/mL (03-09-21 @ 05:15)  CKMB Units: 2.5 ng/mL (03-09-21 @ 02:12)    Creatine Kinase, Serum: 118 U/L (03-09-21 @ 05:15)  Creatine Kinase, Serum: 127 U/L (03-09-21 @ 02:12)    CPK Mass Assay %: 2.0 % (03-09-21 @ 05:15)  CPK Mass Assay %: 2.0 % (03-09-21 @ 02:12)      P2Y12 Plt Reactivity: 190 PRU (03-11-21 @ 00:27)  P2Y12 Plt Reactivity: 192 PRU (03-10-21 @ 16:21)  P2Y12 Plt Reactivity: 100 PRU (03-10-21 @ 03:57)    LIVER FUNCTIONS - ( 11 Mar 2021 00:28 )  Alb: 4.2 g/dL / Pro: 7.5 g/dL / ALK PHOS: 119 U/L / ALT: 36 U/L / AST: 28 U/L / GGT: x           PT/INR - ( 11 Mar 2021 00:28 )   PT: 12.1 sec;   INR: 1.01 ratio         PTT - ( 11 Mar 2021 00:28 )  PTT:61.7 sec    Blood Gas Venous - Lactate: 0.9 mmoL/L (03-11-21 @ 17:44)      ======================Micro/Rad/Cardio=================  Telemetry: Reviewed   EKG: Reviewed  CXR: Reviewed  Echo: Reviewed  Cath: Reviewed  ======================================================  PAST MEDICAL & SURGICAL HISTORY:  Chronic pancreatitis    Hypothyroidism    HLD (hyperlipidemia)    HTN (hypertension)    DM (diabetes mellitus)    No significant past surgical history      ====================ASSESSMENT ==============  S/P LHC on 3/9: Multivessel CAD  DMT2 w/ Hyperglycemia  Hypothyroidism    Plan:  ====================== NEUROLOGY=====================  A&O x3  - Nonfocal, continue to monitor neuro status per ICU protocol.   - Tylenol PRN for analgesia     acetaminophen 300 mG/codeine 30 mG 1 Tablet(s) Oral every 4 hours PRN Moderate Pain (4 - 6)  ==================== RESPIRATORY======================  Comfortable on room air, SpO2 96%  - Continue to monitor SpO2 via pulse oximetry  - Encourage bedside spirometry     ====================CARDIOVASCULAR==================  Multivessel CAD  - S/P ASA and Brilinta load, started on heparin gtt- Brilinta being held for CTS w/u for CABG  - S/P LHC on 3/9: oLAD 90%, mLAD 60%, OM1 70%, OM2 90%, mRCA 95%. No PCI + IABP  - Pt with active CP and HTN augmented diastolic pressures 110s-120s  - C/w NTG gtt for CP resolution and maintain ADp > 90   - C/w Lipitor 80mg QHS w/ ASA  - c/w Lopressor 50 tid  - start hydral 10 tid for afterload reduction   - c/w Hep gtt on IABP   - 3/10 TTE: EF 55-60%. Basal inferolat wall hypokinesis. No effusion, no lv thrombus.     hydrALAZINE 25 milliGRAM(s) Oral three times a day  metoprolol tartrate 50 milliGRAM(s) Oral three times a day  aspirin  chewable 81 milliGRAM(s) Oral daily  atorvastatin 80 milliGRAM(s) Oral at bedtime  ===================HEMATOLOGIC/ONC ===================  H/H 14.8/46.5, stable.   Continue to monitor hemoglobin and hematocrit levels.     VTE prophylaxis   - Continue Heparin for venous thromboembolism prophylaxis.     heparin  Infusion. 1000 Unit(s)/Hr (10 mL/Hr) IV Continuous <Continuous>  ===================== RENAL =========================  No active issues   - Continue to monitor I/Os, BUN/Creatinine, and urine output.   - Goal net negative fluid balance. Replete lytes PRN. Keep K> 4 and Mg >2.     ==================== GASTROINTESTINAL===================  Tolerating PO DASH/TLC diet.     Chronic Pancreatitis   - Patient states that he has had chronic pancreatitis for several years and has been hospitalized multiple times for pain relief. He currently has abd pain which he states feels the same as when he has a flair of his chronic pancreatitis. He states he has a cholecystectomy and "stone removal" from pancreas back in 1992  - Lipase wnl- 24  - WBC elevated at 14  - Gen surg following  - CT Abd/Pel w/ IV contrast 3/11: Suspected left paraduodenal internal hernia. Focally dilated segment of proximal jejunum appears related to prior bowel resection. Partial small bowel obstruction of the mid ileum distal to and separate from the internal hernia. No evidence of bowel ischemia. Appearance of the pancreas consistent with history of chronic pancreatitis without evidence of acute exacerbation. Distal tip of the intra-aortic balloon pump overlaps the origins of the celiac and superior mesenteric arteries. Small right common femoral artery pseudoaneurysm.  - consider bowel rest if continues to be symptomatic     dextrose 5%. 1000 milliLiter(s) (50 mL/Hr) IV Continuous <Continuous>  dextrose 5%. 1000 milliLiter(s) (100 mL/Hr) IV Continuous <Continuous>  potassium chloride    Tablet ER 10 milliEquivalent(s) Oral daily  =======================    ENDOCRINE  =====================  Hx of DMT2  - Glycemic control with Lantus 40U, Admelog Premeals 10U tid, and Glucagon PRN.  - Monitor blood glucose levels.     Hypothyroidism  - C/w Synthroid  - Monitor TFTs    dextrose 40% Gel 15 Gram(s) Oral once  dextrose 50% Injectable 50 milliLiter(s) IV Push every 15 minutes  glucagon  Injectable 1 milliGRAM(s) IntraMuscular once  insulin glargine Injectable (LANTUS) 40 Unit(s) SubCutaneous at bedtime  insulin lispro (ADMELOG) corrective regimen sliding scale   SubCutaneous three times a day before meals  insulin lispro (ADMELOG) corrective regimen sliding scale   SubCutaneous at bedtime  insulin lispro Injectable (ADMELOG) 10 Unit(s) SubCutaneous three times a day before meals  levothyroxine 100 MICROGram(s) Oral daily  ========================INFECTIOUS DISEASE================  Leukocytosis with WBC 14.39, afebrile.   - Monitor temperature and trend WBC.  - Continue Cefuroxime for perioperative antibiotic coverage.    cefuroxime  IVPB 1500 milliGRAM(s) IV Intermittent once      Patient requires continuous monitoring with bedside rhythm monitoring, pulse ox monitoring, and intermittent blood gas analysis. Care plan discussed with ICU care team. Patient remained critical and at risk for life threatening decompensation.  Patient seen, examined and plan discussed with CCU team during rounds.     I have personally provided 35 minutes of critical care time excluding time spent on separate procedures.    By signing my name below, I, Landy Bryant, attest that this documentation has been prepared under the direction and in the presence of KRISTINE Coulter.  Electronically signed: Alonso Duarte, 03-11-21 @ 19:43    I, KRISTINE Coulter, personally performed the services described in this documentation. all medical record entries made by the behzadibe were at my direction and in my presence. I have reviewed the chart and agree that the record reflects my personal performance and is accurate and complete  Electronically signed: KRISTINE Coulter.       SELENE BE  MRN-[id number]  Patient is a 65y old  Male who presents with a chief complaint of chest pain (11 Mar 2021 18:59)    HPI:  65M with PMH of HTN, T2DM, HLD, hypothyroidism, chronic pancreatitis p/w chest pain. Pt states he has been having CP for past 2 weeks - pain was initially only with exertion, located midsternal/epigastric region, burning pain with occasional radiation to the jaw and associated with DUARTE. Pain would improve after about 5 minutes of rest. However, in past few days pt has started having pain even at rest. Denies any associated nausea/vomiting, diaphoresis, palpitations, syncope/lightheadedness. Pt went to see cardiologist for these symptoms and TTE showed inferolateral wall hypokinesis and referred to ED for possible cath. Pt had a normal stress test 2 years ago for pore-op for RLE arthrocentesis. ROS positive for chronic abd pain 2/2 chronic pancreatitis. Prior smoker, quit 1992.     Pt does not know home medications.  (09 Mar 2021 02:17)      Hospital Course:  3/9 Admitted to CICU for S/P LHC: multivessel CAD w/ IABP     24 HOUR EVENTS:  - CT Abd/Pel w/ IV contrast 3/11: Suspected left paraduodenal internal hernia. Focally dilated segment of proximal jejunum appears related to prior bowel resection. Partial small bowel obstruction of the mid ileum distal to and separate from the internal hernia. No evidence of bowel ischemia. Appearance of the pancreas consistent with history of chronic pancreatitis without evidence of acute exacerbation. Distal tip of the intra-aortic balloon pump overlaps the origins of the celiac and superior mesenteric arteries. Small right common femoral artery pseudoaneurysm.  - Surgery evaluated- cleared for CABG and diet, no surgical intervention needed    REVIEW OF SYSTEMS:   Constitutional: No weakness, fevers, or chills  Eyes/ENT: No visual changes  Respiratory: No cough, wheezing, hemoptysis  Cardiovascular: No chest pain, no palpitations  Gastrointestinal: + abdominal pain. No nausea, vomiting, hematemesis.   Genitourinary: No dysuria  Neurological: No numbness, no weakness  Skin: No itching, rashes    ICU Vital Signs Last 24 Hrs  T(C): 36.8 (11 Mar 2021 17:00), Max: 37 (11 Mar 2021 00:00)  T(F): 98.3 (11 Mar 2021 17:00), Max: 98.6 (11 Mar 2021 00:00)  HR: 60 (11 Mar 2021 19:00) (54 - 92)  RR: 18 (11 Mar 2021 19:00) (13 - 42)  SpO2: 96% (11 Mar 2021 19:00) (84% - 97%)    I&O's Summary    10 Mar 2021 07:01  -  11 Mar 2021 07:00  --------------------------------------------------------  IN: 1266 mL / OUT: 1080 mL / NET: 186 mL    11 Mar 2021 07:01  -  11 Mar 2021 19:43  --------------------------------------------------------  IN: 372 mL / OUT: 1725 mL / NET: -1353 mL    POCT Blood Glucose.: 127 mg/dL (11 Mar 2021 17:38)      PHYSICAL EXAM:   General: No acute distress  Eyes: EOMI, PERRLA, conjunctiva and sclera clear  Chest/Lung: CTAB, no wheezes, rales, or rhonchi  Heart: Regular rate, regular rhythm. Normal S1/S2. No murmurs, rubs, or gallops.  Abdomen: Soft, mildly tender to palpation in epigastric and RUQ, nondistended. Normal bowel sounds.  Extremites: 2+ peripheral pulses B/L. No clubbing, cyanosis, or edema.  Neurology: A&O x3, no focal deficits  Skin: No rashes or lesions  Lines: L Fem A. IABP 3/9 c/d/i no hematoma     ============================I/O===========================   I&O's Detail    10 Mar 2021 07:01  -  11 Mar 2021 07:00  --------------------------------------------------------  IN:    Heparin Infusion: 288 mL    Insulin: 34 mL    Nitroglycerin: 114 mL    Oral Fluid: 830 mL  Total IN: 1266 mL    OUT:    Voided (mL): 1080 mL  Total OUT: 1080 mL    Total NET: 186 mL      11 Mar 2021 07:01  -  11 Mar 2021 19:43  --------------------------------------------------------  IN:    Heparin Infusion: 120 mL    Nitroglycerin: 12 mL    Oral Fluid: 240 mL  Total IN: 372 mL    OUT:    Voided (mL): 1725 mL  Total OUT: 1725 mL    Total NET: -1353 mL        ============================ LABS =========================                        14.8   14.39 )-----------( 211      ( 11 Mar 2021 17:46 )             46.5     03-11    142  |  102  |  21  ----------------------------<  125<H>  3.9   |  27  |  1.12    Ca    9.8      11 Mar 2021 00:28  Phos  3.7     03-11  Mg     2.4     03-11    TPro  7.5  /  Alb  4.2  /  TBili  0.6  /  DBili  x   /  AST  28  /  ALT  36  /  AlkPhos  119  03-11    Troponin T, High Sensitivity Result: 51 ng/L (03-09-21 @ 05:15)  Troponin T, High Sensitivity Result: 43 ng/L (03-09-21 @ 02:12)  Troponin T, High Sensitivity Result: 36 ng/L (03-08-21 @ 21:31)    CKMB Units: 2.4 ng/mL (03-09-21 @ 05:15)  CKMB Units: 2.5 ng/mL (03-09-21 @ 02:12)    Creatine Kinase, Serum: 118 U/L (03-09-21 @ 05:15)  Creatine Kinase, Serum: 127 U/L (03-09-21 @ 02:12)    CPK Mass Assay %: 2.0 % (03-09-21 @ 05:15)  CPK Mass Assay %: 2.0 % (03-09-21 @ 02:12)      P2Y12 Plt Reactivity: 190 PRU (03-11-21 @ 00:27)  P2Y12 Plt Reactivity: 192 PRU (03-10-21 @ 16:21)  P2Y12 Plt Reactivity: 100 PRU (03-10-21 @ 03:57)    LIVER FUNCTIONS - ( 11 Mar 2021 00:28 )  Alb: 4.2 g/dL / Pro: 7.5 g/dL / ALK PHOS: 119 U/L / ALT: 36 U/L / AST: 28 U/L / GGT: x           PT/INR - ( 11 Mar 2021 00:28 )   PT: 12.1 sec;   INR: 1.01 ratio         PTT - ( 11 Mar 2021 00:28 )  PTT:61.7 sec    Blood Gas Venous - Lactate: 0.9 mmoL/L (03-11-21 @ 17:44)      ======================Micro/Rad/Cardio=================  Telemetry: Reviewed   EKG: Reviewed  CXR: Reviewed  Echo: Reviewed  Cath: Reviewed  ======================================================  PAST MEDICAL & SURGICAL HISTORY:  Chronic pancreatitis    Hypothyroidism    HLD (hyperlipidemia)    HTN (hypertension)    DM (diabetes mellitus)    No significant past surgical history      ====================ASSESSMENT ==============  S/P LHC on 3/9: Multivessel CAD  DMT2 w/ Hyperglycemia  Hypothyroidism    Plan:  ====================== NEUROLOGY=====================  A&O x3  - Nonfocal, continue to monitor neuro status per ICU protocol.   - Tylenol PRN for analgesia     acetaminophen 300 mG/codeine 30 mG 1 Tablet(s) Oral every 4 hours PRN Moderate Pain (4 - 6)  ==================== RESPIRATORY======================  Comfortable on room air, SpO2 96%  - Continue to monitor SpO2 via pulse oximetry  - Encourage bedside spirometry     ====================CARDIOVASCULAR==================  Multivessel CAD  - S/P ASA and Brilinta load, started on heparin gtt- Brilinta being held for CTS w/u for CABG  - S/P LHC on 3/9: oLAD 90%, mLAD 60%, OM1 70%, OM2 90%, mRCA 95%. No PCI + IABP  - Pt with active CP and HTN augmented diastolic pressures 110s-120s  - C/w NTG gtt for CP resolution and maintain ADp > 90   - C/w Lipitor 80mg QHS w/ ASA  - c/w Lopressor 50 tid  - start hydral 10 tid for afterload reduction   - c/w Hep gtt on IABP   - 3/10 TTE: EF 55-60%. Basal inferolat wall hypokinesis. No effusion, no lv thrombus.   -Pending CABG 3/12, NPO after midnight     hydrALAZINE 25 milliGRAM(s) Oral three times a day  metoprolol tartrate 50 milliGRAM(s) Oral three times a day  aspirin  chewable 81 milliGRAM(s) Oral daily  atorvastatin 80 milliGRAM(s) Oral at bedtime  ===================HEMATOLOGIC/ONC ===================  H/H 14.8/46.5, stable.   Continue to monitor hemoglobin and hematocrit levels.     VTE prophylaxis   - Continue Heparin for venous thromboembolism prophylaxis.     heparin  Infusion. 1000 Unit(s)/Hr (10 mL/Hr) IV Continuous <Continuous>  ===================== RENAL =========================  No active issues   - Continue to monitor I/Os, BUN/Creatinine, and urine output.   - Goal net negative fluid balance. Replete lytes PRN. Keep K> 4 and Mg >2.     ==================== GASTROINTESTINAL===================  Tolerating PO DASH/TLC diet.     Chronic Pancreatitis   - Patient states that he has had chronic pancreatitis for several years and has been hospitalized multiple times for pain relief. He currently has abd pain which he states feels the same as when he has a flair of his chronic pancreatitis. He states he has a cholecystectomy and "stone removal" from pancreas back in 1992  - Lipase wnl- 24  - WBC elevated at 14  - Gen surg following- no surgical intervention at this time, cleared for CABG tomorrow   - CT Abd/Pel w/ IV contrast 3/11: Suspected left paraduodenal internal hernia. Focally dilated segment of proximal jejunum appears related to prior bowel resection. Partial small bowel obstruction of the mid ileum distal to and separate from the internal hernia. No evidence of bowel ischemia. Appearance of the pancreas consistent with history of chronic pancreatitis without evidence of acute exacerbation. Distal tip of the intra-aortic balloon pump overlaps the origins of the celiac and superior mesenteric arteries. Small right common femoral artery pseudoaneurysm.  - consider bowel rest if continues to be symptomatic     dextrose 5%. 1000 milliLiter(s) (50 mL/Hr) IV Continuous <Continuous>  dextrose 5%. 1000 milliLiter(s) (100 mL/Hr) IV Continuous <Continuous>  potassium chloride    Tablet ER 10 milliEquivalent(s) Oral daily  =======================    ENDOCRINE  =====================  Hx of DMT2  - Glycemic control with Lantus 40U, Admelog Premeals 10U tid, and Glucagon PRN.  - Monitor blood glucose levels.     Hypothyroidism  - C/w Synthroid  - Monitor TFTs    dextrose 40% Gel 15 Gram(s) Oral once  dextrose 50% Injectable 50 milliLiter(s) IV Push every 15 minutes  glucagon  Injectable 1 milliGRAM(s) IntraMuscular once  insulin glargine Injectable (LANTUS) 40 Unit(s) SubCutaneous at bedtime  insulin lispro (ADMELOG) corrective regimen sliding scale   SubCutaneous three times a day before meals  insulin lispro (ADMELOG) corrective regimen sliding scale   SubCutaneous at bedtime  insulin lispro Injectable (ADMELOG) 10 Unit(s) SubCutaneous three times a day before meals  levothyroxine 100 MICROGram(s) Oral daily  ========================INFECTIOUS DISEASE================  Leukocytosis with WBC 14.39, afebrile.   - Monitor temperature and trend WBC.  - Continue Cefuroxime for perioperative antibiotic coverage.    cefuroxime  IVPB 1500 milliGRAM(s) IV Intermittent once      Patient requires continuous monitoring with bedside rhythm monitoring, pulse ox monitoring, and intermittent blood gas analysis. Care plan discussed with ICU care team. Patient remained critical and at risk for life threatening decompensation.  Patient seen, examined and plan discussed with CCU team during rounds.     I have personally provided 35 minutes of critical care time excluding time spent on separate procedures.    By signing my name below, I, Landy Bryant, attest that this documentation has been prepared under the direction and in the presence of KRISTINE Coulter.  Electronically signed: Caron Duarte, 03-11-21 @ 19:43    I, KRISTINE Coulter, personally performed the services described in this documentation. all medical record entries made by the caron were at my direction and in my presence. I have reviewed the chart and agree that the record reflects my personal performance and is accurate and complete  Electronically signed: KRISTINE Coulter.

## 2021-03-11 NOTE — DIETITIAN INITIAL EVALUATION ADULT. - PERTINENT MEDS FT
MEDICATIONS  (STANDING):  aspirin  chewable 81 milliGRAM(s) Oral daily  atorvastatin 80 milliGRAM(s) Oral at bedtime  chlorhexidine 2% Cloths 1 Application(s) Topical <User Schedule>  dextrose 40% Gel 15 Gram(s) Oral once  dextrose 5%. 1000 milliLiter(s) (50 mL/Hr) IV Continuous <Continuous>  dextrose 5%. 1000 milliLiter(s) (100 mL/Hr) IV Continuous <Continuous>  dextrose 50% Injectable 50 milliLiter(s) IV Push every 15 minutes  glucagon  Injectable 1 milliGRAM(s) IntraMuscular once  heparin  Infusion. 1000 Unit(s)/Hr (10 mL/Hr) IV Continuous <Continuous>  hydrALAZINE 10 milliGRAM(s) Oral three times a day  insulin glargine Injectable (LANTUS) 40 Unit(s) SubCutaneous at bedtime  insulin lispro (ADMELOG) corrective regimen sliding scale   SubCutaneous three times a day before meals  insulin lispro (ADMELOG) corrective regimen sliding scale   SubCutaneous at bedtime  insulin lispro Injectable (ADMELOG) 10 Unit(s) SubCutaneous three times a day before meals  latanoprost 0.005% Ophthalmic Solution 1 Drop(s) Both EYES at bedtime  levothyroxine 100 MICROGram(s) Oral daily  metoprolol tartrate 50 milliGRAM(s) Oral three times a day  potassium chloride    Tablet ER 10 milliEquivalent(s) Oral daily    MEDICATIONS  (PRN):  acetaminophen 300 mG/codeine 30 mG 1 Tablet(s) Oral every 4 hours PRN Moderate Pain (4 - 6)  BACItracin   Ointment 1 Application(s) Topical every 12 hours PRN Pain/itching

## 2021-03-11 NOTE — DIETITIAN INITIAL EVALUATION ADULT. - FACTORS AFF FOOD INTAKE
in house reports appetite has been fine up until today since he is getting worried about surgery tomorrow, was able to consume all of cereal and milk and coffee this AM; denies any chewing/swallowing issues; reports no issue c BMs at this time; reports he was having some abdominal pain but that has now improved and team is aware

## 2021-03-11 NOTE — DIETITIAN INITIAL EVALUATION ADULT. - PERTINENT LABORATORY DATA
03-11 @ 00:28: Na 142, BUN 21, Cr 1.12, <H>, K+ 3.9, Phos 3.7, Mg 2.4, Alk Phos 119, ALT/SGPT 36, AST/SGOT 28, HbA1c --    3/9: Cholesterol 111, , HDL 36, LDL 55; A1C 8.2%    CAPILLARY BLOOD GLUCOSE      POCT Blood Glucose.: 159 mg/dL (11 Mar 2021 08:41)  POCT Blood Glucose.: 163 mg/dL (10 Mar 2021 21:18)  POCT Blood Glucose.: 144 mg/dL (10 Mar 2021 20:03)  POCT Blood Glucose.: 96 mg/dL (10 Mar 2021 18:08)  POCT Blood Glucose.: 105 mg/dL (10 Mar 2021 17:01)  POCT Blood Glucose.: 121 mg/dL (10 Mar 2021 16:07)  POCT Blood Glucose.: 120 mg/dL (10 Mar 2021 14:56)  POCT Blood Glucose.: 126 mg/dL (10 Mar 2021 14:06)  POCT Blood Glucose.: 133 mg/dL (10 Mar 2021 13:17)  POCT Blood Glucose.: 212 mg/dL (10 Mar 2021 12:13)  POCT Blood Glucose.: 244 mg/dL (10 Mar 2021 11:04)

## 2021-03-11 NOTE — DIETITIAN INITIAL EVALUATION ADULT. - ORAL INTAKE PTA/DIET HISTORY
Pt reports overall good appetite and intake PTA, reports he usually consumes about 3 meals daily, drinks mostly water throughout the day. States Finger sticks at home are usually in the 80s/90s in the morning. Works as a  & reports at times having hypoglycemia after coming home & will eat some cookies to raise Finger sticks. Reports usual eating habits include a variety of foods-fruits, vegetables, lean protein, whole grains. Avoids high fat foods due to pancreatitis. Avoids added salt.

## 2021-03-11 NOTE — DIETITIAN INITIAL EVALUATION ADULT. - OTHER INFO
Pt reports NKFA. States he was taking multivitamin supplement at home.     Pt reports he sees his primary care doctor monthly & thought he weighed about 195 pounds (consistent c dosing wt) but recent bed wt was about 184 pounds, pt does not believe he has had any recent wt changes & feels clothing has been fitting the same at home. Questionable accuracy of weights, would monitor.

## 2021-03-11 NOTE — DIETITIAN INITIAL EVALUATION ADULT. - EDUCATION DIETARY MODIFICATIONS
No significant past surgical history
Reviewed how to address hypoglycemia (15/15 rule) and discussed importance of continued good PO intake and glycemic control in setting of pending CABG. Pt verbalized understanding, fair to good compliance expected./(1) partially meets; needs review/practice/verbalization

## 2021-03-11 NOTE — CONSULT NOTE ADULT - ASSESSMENT
64y/o presenting with unstable angina with intra-aortic balloon pump placed planned for CABG tomorrow surgery consulted to evaluate for chronic pancreatitis     - no acute general surgery consult at this time   - CT scan findings demonstrating pancreatitis chronic  possible partial small bowel obstruction and internal hernia however, patient not clinically obstructed  - no general surgery contraindication to CABG tomorrow   - care per primary team    Patient seen and examined with attending Dr. Castro   [phone]

## 2021-03-11 NOTE — PROGRESS NOTE ADULT - SUBJECTIVE AND OBJECTIVE BOX
Cardiovascular Disease Progress Note    Overnight events: No acute events overnight.  no cp/sob/palps/dizziness  Otherwise review of systems negative    Objective Findings:  T(C): 36.8 (21 @ 05:00), Max: 37 (21 @ 00:00)  HR: 64 (21 @ 06:00) (64 - 92)  BP: --  RR: 22 (21 @ 06:00) (14 - 29)  SpO2: 94% (21 @ 06:00) (91% - 97%)  Wt(kg): --  Daily     Daily Weight in k.4 (11 Mar 2021 05:00)      Physical Exam:  Gen: NAD  HEENT: EOMI  CV: RRR, normal S1 + S2, no m/r/g  Lungs: CTAB  Abd: soft, non-tender  Ext: No edema. groin site c/d/i    Telemetry: nsr    Laboratory Data:                        15.3   15.02 )-----------( 262      ( 11 Mar 2021 00:28 )             48.1         142  |  102  |  21  ----------------------------<  125<H>  3.9   |  27  |  1.12    Ca    9.8      11 Mar 2021 00:28  Phos  3.7       Mg     2.4         TPro  7.5  /  Alb  4.2  /  TBili  0.6  /  DBili  x   /  AST  28  /  ALT  36  /  AlkPhos  119  11    PT/INR - ( 11 Mar 2021 00:28 )   PT: 12.1 sec;   INR: 1.01 ratio         PTT - ( 11 Mar 2021 00:28 )  PTT:61.7 sec          Inpatient Medications:  MEDICATIONS  (STANDING):  aspirin  chewable 81 milliGRAM(s) Oral daily  atorvastatin 80 milliGRAM(s) Oral at bedtime  chlorhexidine 2% Cloths 1 Application(s) Topical <User Schedule>  dextrose 40% Gel 15 Gram(s) Oral once  dextrose 5%. 1000 milliLiter(s) (50 mL/Hr) IV Continuous <Continuous>  dextrose 5%. 1000 milliLiter(s) (100 mL/Hr) IV Continuous <Continuous>  dextrose 50% Injectable 50 milliLiter(s) IV Push every 15 minutes  glucagon  Injectable 1 milliGRAM(s) IntraMuscular once  heparin  Infusion. 1000 Unit(s)/Hr (10 mL/Hr) IV Continuous <Continuous>  hydrALAZINE 10 milliGRAM(s) Oral three times a day  insulin glargine Injectable (LANTUS) 40 Unit(s) SubCutaneous at bedtime  insulin lispro (ADMELOG) corrective regimen sliding scale   SubCutaneous three times a day before meals  insulin lispro (ADMELOG) corrective regimen sliding scale   SubCutaneous at bedtime  insulin lispro Injectable (ADMELOG) 10 Unit(s) SubCutaneous three times a day before meals  latanoprost 0.005% Ophthalmic Solution 1 Drop(s) Both EYES at bedtime  levothyroxine 100 MICROGram(s) Oral daily  metoprolol tartrate 50 milliGRAM(s) Oral three times a day  nitroglycerin  Infusion 20 MICROgram(s)/Min (6 mL/Hr) IV Continuous <Continuous>  potassium chloride    Tablet ER 10 milliEquivalent(s) Oral daily      Assessment:  -unstable angina  -mvd  -htn  -hld  -dm  -chronic pancreatitis    Recs:  s/p lhc with mvd, awating cabg with dr martinez  iabp 1:1 for coronary perfusion, hydralazine and nitro gtt as hemos allow given ongoing chest pains  cont to hold brilinta, p2y12 assay 190  cw asa, statin beta blockers and hep gtt  tte inf hk, overall preserved lv fxn  fu cts   care per ccu      Over 35 minutes spent on total encounter; more than 50% of the visit was spent counseling and/or coordinating care by the attending physician.      Alexei Fraga MD   Cardiovascular Disease  [phone]

## 2021-03-11 NOTE — PROGRESS NOTE ADULT - ASSESSMENT
65M with PMH of HTN, T2DM, HLD, hypothyroidism, chronic pancreatitis p/w chest pain. S/p LHC revealing oLAD 90%, mLAD 60%, OM1 70%, OM2 90%, mRCA 95%, LV gram 45%, EDP 14. s/p Brillinta load 3/8/21. CT surgery consulted for CABG eval. Pt with unstable angina, s/p IABP and tx to CCU management.         - hep gtt, on IABP, p2y12 today 190  - OR tomorrow w/ Dr Perkins   - NPO after MN except meds  - Hibiclens / OR wash scrub  - Type and cross x2  -zinacef iv abc preop ppx, tape to chart , on call to OR   - Covid PCR sent today pending results   65M with PMH of HTN, T2DM, HLD, hypothyroidism, chronic pancreatitis p/w chest pain. S/p LHC revealing oLAD 90%, mLAD 60%, OM1 70%, OM2 90%, mRCA 95%, LV gram 45%, EDP 14. s/p Brillinta load 3/8/21. CT surgery consulted for CABG eval. Pt with unstable angina, s/p IABP and tx to CCU management.         - hep gtt, on IABP, p2y12 today 190  - OR tomorrow w/ Dr Perkins   - NPO after MN except meds  - Hibiclens / OR wash scrub  - Type and cross x2  -zinacef iv abc preop ppx, tape to chart , on call to OR   - Covid PCR sent today pending results  -Pls send UA stat today  65M with PMH of HTN, T2DM, HLD, hypothyroidism, chronic pancreatitis p/w chest pain. S/p LHC revealing oLAD 90%, mLAD 60%, OM1 70%, OM2 90%, mRCA 95%, LV gram 45%, EDP 14. s/p Brillinta load 3/8/21. CT surgery consulted for CABG eval. Pt with unstable angina, s/p IABP and tx to CCU management.         - hep gtt, on IABP, p2y12 today 190  - OR tomorrow w/ Dr Perkins   - NPO after MN except meds  - Hibiclens / OR wash scrub  - Type and cross x2  -zinacef iv abc preop ppx, tape to chart , on call to OR   - Covid PCR sent today pending results  -Pls send UA stat today   CT abd pending

## 2021-03-11 NOTE — DIETITIAN INITIAL EVALUATION ADULT. - REASON FOR ADMISSION
chest pain; per CICU team ,pt c NSTEMI, S/P LHC, found to have triple vessel disease, required IABP, awaiting CABG. Pt also c h/o chronic pancreatitis.

## 2021-03-11 NOTE — PROGRESS NOTE ADULT - SUBJECTIVE AND OBJECTIVE BOX
Patient is a 65y old  Male who presents with a chief complaint of chest pain (11 Mar 2021 19:42)      SUBJECTIVE / OVERNIGHT EVENTS: c/o abdominal discomfort  Review of Systems  chest pain no  palpitations no  sob no  nausea no  headache no    MEDICATIONS  (STANDING):  aspirin  chewable 81 milliGRAM(s) Oral daily  atorvastatin 80 milliGRAM(s) Oral at bedtime  cefuroxime  IVPB 1500 milliGRAM(s) IV Intermittent once  chlorhexidine 0.12% Liquid 30 milliLiter(s) Swish and Spit once  chlorhexidine 2% Cloths 1 Application(s) Topical <User Schedule>  chlorhexidine 4% Liquid 1 Application(s) Topical once  dextrose 40% Gel 15 Gram(s) Oral once  dextrose 5%. 1000 milliLiter(s) (50 mL/Hr) IV Continuous <Continuous>  dextrose 5%. 1000 milliLiter(s) (100 mL/Hr) IV Continuous <Continuous>  dextrose 50% Injectable 50 milliLiter(s) IV Push every 15 minutes  glucagon  Injectable 1 milliGRAM(s) IntraMuscular once  heparin  Infusion. 1000 Unit(s)/Hr (10 mL/Hr) IV Continuous <Continuous>  hydrALAZINE 25 milliGRAM(s) Oral three times a day  insulin glargine Injectable (LANTUS) 40 Unit(s) SubCutaneous at bedtime  insulin lispro (ADMELOG) corrective regimen sliding scale   SubCutaneous three times a day before meals  insulin lispro (ADMELOG) corrective regimen sliding scale   SubCutaneous at bedtime  insulin lispro Injectable (ADMELOG) 10 Unit(s) SubCutaneous three times a day before meals  latanoprost 0.005% Ophthalmic Solution 1 Drop(s) Both EYES at bedtime  levothyroxine 100 MICROGram(s) Oral daily  metoprolol tartrate 50 milliGRAM(s) Oral three times a day  mupirocin 2% Ointment 1 Application(s) Topical two times a day  potassium chloride    Tablet ER 10 milliEquivalent(s) Oral daily    MEDICATIONS  (PRN):  acetaminophen 300 mG/codeine 30 mG 1 Tablet(s) Oral every 4 hours PRN Moderate Pain (4 - 6)  BACItracin   Ointment 1 Application(s) Topical every 12 hours PRN Pain/itching      Vital Signs Last 24 Hrs  T(C): 36.8 (11 Mar 2021 17:00), Max: 37 (11 Mar 2021 00:00)  T(F): 98.3 (11 Mar 2021 17:00), Max: 98.6 (11 Mar 2021 00:00)  HR: 60 (11 Mar 2021 19:00) (54 - 86)  BP: --  BP(mean): --  RR: 18 (11 Mar 2021 19:00) (13 - 42)  SpO2: 96% (11 Mar 2021 19:00) (84% - 97%)    PHYSICAL EXAM:  GENERAL: NAD  HEAD:  Atraumatic, Normocephalic  EYES: EOMI, PERRLA, conjunctiva and sclera clear  NECK: Supple, No JVD  CHEST/LUNG: Clear to auscultation bilaterally; No wheeze  HEART: Regular rate and rhythm; No murmurs, rubs, or gallops IABP  ABDOMEN: Soft, Nontender, Nondistended; Bowel sounds present  EXTREMITIES:  2+ Peripheral Pulses, No clubbing, cyanosis, or edema  PSYCH: AAOx3  NEUROLOGY: non-focal  SKIN: No rashes or lesions    LABS:                        14.8   14.39 )-----------( 211      ( 11 Mar 2021 17:46 )             46.5     03-11    142  |  102  |  21  ----------------------------<  125<H>  3.9   |  27  |  1.12    Ca    9.8      11 Mar 2021 00:28  Phos  3.7     03-11  Mg     2.4     03-11    TPro  7.5  /  Alb  4.2  /  TBili  0.6  /  DBili  x   /  AST  28  /  ALT  36  /  AlkPhos  119  03-11    PT/INR - ( 11 Mar 2021 00:28 )   PT: 12.1 sec;   INR: 1.01 ratio         PTT - ( 11 Mar 2021 00:28 )  PTT:61.7 sec            RADIOLOGY & ADDITIONAL TESTS:    Imaging Personally Reviewed:  < from: CT Abdomen and Pelvis w/ IV Cont (03.11.21 @ 14:12) >  IMPRESSION:  Suspected left paraduodenal internal hernia.    Focally dilated segment of proximal jejunum appears related to prior bowel resection. Correlate with surgical history.    Partial small bowel obstruction of the mid ileum distal to and separate from the internal hernia. No evidence of bowel ischemia.    Appearance of the pancreas consistent with history of chronic pancreatitis without evidence of acute exacerbation.    Distal tip of the intra-aortic balloon pump overlaps the origins of the celiac and superior mesenteric arteries.    Small right common femoral artery pseudoaneurysm.    < end of copied text >    Consultant(s) Notes Reviewed:      Care Discussed with Consultants/Other Providers:

## 2021-03-11 NOTE — PROGRESS NOTE ADULT - SUBJECTIVE AND OBJECTIVE BOX
PATIENT:  SELENE BE  [id number]    CHIEF COMPLAINT:  Patient is a 65y old  Male who presents with a chief complaint of chest pain (10 Mar 2021 20:40)      INTERVAL HISTORY/OVERNIGHT EVENTS:      REVIEW OF SYSTEMS:    Constitutional:     [ ] negative [ ] fevers [ ] chills [ ] weight loss [ ] weight gain  HEENT:                  [ ] negative [ ] dry eyes [ ] eye irritation [ ] postnasal drip [ ] nasal congestion  CV:                         [ ] negative  [ ] chest pain [ ] orthopnea [ ] palpitations [ ] murmur  Resp:                     [ ] negative [ ] cough [ ] shortness of breath [ ] dyspnea [ ] wheezing [ ] sputum [ ] hemoptysis  GI:                          [ ] negative [ ] nausea [ ] vomiting [ ] diarrhea [ ] constipation [ ] abd pain [ ] dysphagia   :                        [ ] negative [ ] dysuria [ ] nocturia [ ] hematuria [ ] increased urinary frequency  Musculoskeletal: [ ] negative [ ] back pain [ ] myalgias [ ] arthralgias [ ] fracture  Skin:                       [ ] negative [ ] rash [ ] itch  Neurological:        [ ] negative [ ] headache [ ] dizziness [ ] syncope [ ] weakness [ ] numbness  Psychiatric:           [ ] negative [ ] anxiety [ ] depression  Endocrine:            [ ] negative [ ] diabetes [ ] thyroid problem  Heme/Lymph:      [ ] negative [ ] anemia [ ] bleeding problem  Allergic/Immune: [ ] negative [ ] itchy eyes [ ] nasal discharge [ ] hives [ ] angioedema    [ ] All other systems negative  [ ] Unable to assess ROS because ________.    MEDICATIONS:  MEDICATIONS  (STANDING):  aspirin  chewable 81 milliGRAM(s) Oral daily  atorvastatin 80 milliGRAM(s) Oral at bedtime  chlorhexidine 2% Cloths 1 Application(s) Topical <User Schedule>  dextrose 40% Gel 15 Gram(s) Oral once  dextrose 5%. 1000 milliLiter(s) (50 mL/Hr) IV Continuous <Continuous>  dextrose 5%. 1000 milliLiter(s) (100 mL/Hr) IV Continuous <Continuous>  dextrose 50% Injectable 50 milliLiter(s) IV Push every 15 minutes  glucagon  Injectable 1 milliGRAM(s) IntraMuscular once  heparin  Infusion. 1000 Unit(s)/Hr (10 mL/Hr) IV Continuous <Continuous>  hydrALAZINE 10 milliGRAM(s) Oral three times a day  insulin glargine Injectable (LANTUS) 40 Unit(s) SubCutaneous at bedtime  insulin lispro (ADMELOG) corrective regimen sliding scale   SubCutaneous three times a day before meals  insulin lispro (ADMELOG) corrective regimen sliding scale   SubCutaneous at bedtime  insulin lispro Injectable (ADMELOG) 10 Unit(s) SubCutaneous three times a day before meals  latanoprost 0.005% Ophthalmic Solution 1 Drop(s) Both EYES at bedtime  levothyroxine 100 MICROGram(s) Oral daily  metoprolol tartrate 50 milliGRAM(s) Oral three times a day  nitroglycerin  Infusion 20 MICROgram(s)/Min (6 mL/Hr) IV Continuous <Continuous>  potassium chloride    Tablet ER 10 milliEquivalent(s) Oral daily    MEDICATIONS  (PRN):  acetaminophen 300 mG/codeine 30 mG 1 Tablet(s) Oral every 4 hours PRN Moderate Pain (4 - 6)  BACItracin   Ointment 1 Application(s) Topical every 12 hours PRN Pain/itching      ALLERGIES:  Allergies    morphine (Urticaria)    Intolerances        OBJECTIVE:  ICU Vital Signs Last 24 Hrs  T(C): 36.8 (11 Mar 2021 05:00), Max: 37 (11 Mar 2021 00:00)  T(F): 98.2 (11 Mar 2021 05:00), Max: 98.6 (11 Mar 2021 00:00)  HR: 64 (11 Mar 2021 06:00) (64 - 92)  BP: --  BP(mean): --  ABP: --  ABP(mean): --  RR: 22 (11 Mar 2021 06:00) (13 - 29)  SpO2: 94% (11 Mar 2021 06:00) (91% - 97%)      Adult Advanced Hemodynamics Last 24 Hrs  CVP(mm Hg): --  CVP(cm H2O): --  CO: --  CI: --  PA: --  PA(mean): --  PCWP: --  SVR: --  SVRI: --  PVR: --  PVRI: --  CAPILLARY BLOOD GLUCOSE      POCT Blood Glucose.: 163 mg/dL (10 Mar 2021 21:18)  POCT Blood Glucose.: 144 mg/dL (10 Mar 2021 20:03)  POCT Blood Glucose.: 96 mg/dL (10 Mar 2021 18:08)  POCT Blood Glucose.: 105 mg/dL (10 Mar 2021 17:01)  POCT Blood Glucose.: 121 mg/dL (10 Mar 2021 16:07)  POCT Blood Glucose.: 120 mg/dL (10 Mar 2021 14:56)  POCT Blood Glucose.: 126 mg/dL (10 Mar 2021 14:06)  POCT Blood Glucose.: 133 mg/dL (10 Mar 2021 13:17)  POCT Blood Glucose.: 212 mg/dL (10 Mar 2021 12:13)  POCT Blood Glucose.: 244 mg/dL (10 Mar 2021 11:04)  POCT Blood Glucose.: 252 mg/dL (10 Mar 2021 10:08)  POCT Blood Glucose.: 277 mg/dL (10 Mar 2021 07:33)    CAPILLARY BLOOD GLUCOSE      POCT Blood Glucose.: 163 mg/dL (10 Mar 2021 21:18)    I&O's Summary    09 Mar 2021 07:01  -  10 Mar 2021 07:00  --------------------------------------------------------  IN: 228 mL / OUT: 275 mL / NET: -47 mL    10 Mar 2021 07:01  -  11 Mar 2021 06:51  --------------------------------------------------------  IN: 1248 mL / OUT: 1080 mL / NET: 168 mL      Daily     Daily Weight in k.4 (11 Mar 2021 05:00)    PHYSICAL EXAMINATION:  General: WN/WD NAD  HEENT: PERRLA, EOMI, moist mucous membranes  Neurology: A&Ox3, nonfocal, QUIROZ x 4  Respiratory: CTA B/L, normal respiratory effort, no wheezes, crackles, rales  CV: RRR, S1S2, no murmurs, rubs or gallops  Abdominal: Soft, NT, ND +BS, Last BM  Extremities: No edema, + peripheral pulses  Incisions:   Tubes:    LABS:                          15.3   15.02 )-----------( 262      ( 11 Mar 2021 00:28 )             48.1     03-11    142  |  102  |  21  ----------------------------<  125<H>  3.9   |  27  |  1.12    Ca    9.8      11 Mar 2021 00:28  Phos  3.7     03-11  Mg     2.4     -11    TPro  7.5  /  Alb  4.2  /  TBili  0.6  /  DBili  x   /  AST  28  /  ALT  36  /  AlkPhos  119  03-11    LIVER FUNCTIONS - ( 11 Mar 2021 00:28 )  Alb: 4.2 g/dL / Pro: 7.5 g/dL / ALK PHOS: 119 U/L / ALT: 36 U/L / AST: 28 U/L / GGT: x           PT/INR - ( 11 Mar 2021 00:28 )   PT: 12.1 sec;   INR: 1.01 ratio       PTT - ( 11 Mar 2021 00:28 )  PTT:61.7 sec    TELEMETRY:     EKG:     IMAGING:     PATIENT:  SELENE BE  [id number]    CHIEF COMPLAINT:  Patient is a 65y old  Male who presents with a chief complaint of chest pain (10 Mar 2021 20:40)    INTERVAL HISTORY/OVERNIGHT EVENTS:  Pt reported significant abdominal pain. Night coverage called surgery, who recommended CT a/p to eval for chronic pancreatitis, Lipase wnl. Pt received dilaudid with improvement in pain.     REVIEW OF SYSTEMS:    Constitutional:     [ ] negative [ ] fevers [ ] chills [ ] weight loss [ ] weight gain  HEENT:                  [ ] negative [ ] dry eyes [ ] eye irritation [ ] postnasal drip [ ] nasal congestion  CV:                         [ ] negative  [ ] chest pain [ ] orthopnea [ ] palpitations [ ] murmur  Resp:                     [ ] negative [ ] cough [ ] shortness of breath [ ] dyspnea [ ] wheezing [ ] sputum [ ] hemoptysis  GI:                          [ ] negative [ ] nausea [ ] vomiting [ ] diarrhea [ ] constipation [ x ] abd pain [ ] dysphagia   :                        [ ] negative [ ] dysuria [ ] nocturia [ ] hematuria [ ] increased urinary frequency  Musculoskeletal: [ ] negative [ ] back pain [ ] myalgias [ ] arthralgias [ ] fracture  Skin:                       [ ] negative [ ] rash [ ] itch  Neurological:        [ ] negative [ ] headache [ ] dizziness [ ] syncope [ ] weakness [ ] numbness  Psychiatric:           [ ] negative [ ] anxiety [ ] depression  Endocrine:            [ ] negative [ ] diabetes [ ] thyroid problem  Heme/Lymph:      [ ] negative [ ] anemia [ ] bleeding problem  Allergic/Immune: [ ] negative [ ] itchy eyes [ ] nasal discharge [ ] hives [ ] angioedema    [ x ] All other systems negative  [ ] Unable to assess ROS because ________.    MEDICATIONS:  MEDICATIONS  (STANDING):  aspirin  chewable 81 milliGRAM(s) Oral daily  atorvastatin 80 milliGRAM(s) Oral at bedtime  chlorhexidine 2% Cloths 1 Application(s) Topical <User Schedule>  dextrose 40% Gel 15 Gram(s) Oral once  dextrose 5%. 1000 milliLiter(s) (50 mL/Hr) IV Continuous <Continuous>  dextrose 5%. 1000 milliLiter(s) (100 mL/Hr) IV Continuous <Continuous>  dextrose 50% Injectable 50 milliLiter(s) IV Push every 15 minutes  glucagon  Injectable 1 milliGRAM(s) IntraMuscular once  heparin  Infusion. 1000 Unit(s)/Hr (10 mL/Hr) IV Continuous <Continuous>  hydrALAZINE 10 milliGRAM(s) Oral three times a day  insulin glargine Injectable (LANTUS) 40 Unit(s) SubCutaneous at bedtime  insulin lispro (ADMELOG) corrective regimen sliding scale   SubCutaneous three times a day before meals  insulin lispro (ADMELOG) corrective regimen sliding scale   SubCutaneous at bedtime  insulin lispro Injectable (ADMELOG) 10 Unit(s) SubCutaneous three times a day before meals  latanoprost 0.005% Ophthalmic Solution 1 Drop(s) Both EYES at bedtime  levothyroxine 100 MICROGram(s) Oral daily  metoprolol tartrate 50 milliGRAM(s) Oral three times a day  nitroglycerin  Infusion 20 MICROgram(s)/Min (6 mL/Hr) IV Continuous <Continuous>  potassium chloride    Tablet ER 10 milliEquivalent(s) Oral daily    MEDICATIONS  (PRN):  acetaminophen 300 mG/codeine 30 mG 1 Tablet(s) Oral every 4 hours PRN Moderate Pain (4 - 6)  BACItracin   Ointment 1 Application(s) Topical every 12 hours PRN Pain/itching      ALLERGIES:  Allergies    morphine (Urticaria)    Intolerances        OBJECTIVE:  ICU Vital Signs Last 24 Hrs  T(C): 36.8 (11 Mar 2021 05:00), Max: 37 (11 Mar 2021 00:00)  T(F): 98.2 (11 Mar 2021 05:00), Max: 98.6 (11 Mar 2021 00:00)  HR: 64 (11 Mar 2021 06:00) (64 - 92)  BP: --  BP(mean): --  ABP: --  ABP(mean): --  RR: 22 (11 Mar 2021 06:00) (13 - 29)  SpO2: 94% (11 Mar 2021 06:00) (91% - 97%)      Adult Advanced Hemodynamics Last 24 Hrs  CVP(mm Hg): --  CVP(cm H2O): --  CO: --  CI: --  PA: --  PA(mean): --  PCWP: --  SVR: --  SVRI: --  PVR: --  PVRI: --  CAPILLARY BLOOD GLUCOSE      POCT Blood Glucose.: 163 mg/dL (10 Mar 2021 21:18)  POCT Blood Glucose.: 144 mg/dL (10 Mar 2021 20:03)  POCT Blood Glucose.: 96 mg/dL (10 Mar 2021 18:08)  POCT Blood Glucose.: 105 mg/dL (10 Mar 2021 17:01)  POCT Blood Glucose.: 121 mg/dL (10 Mar 2021 16:07)  POCT Blood Glucose.: 120 mg/dL (10 Mar 2021 14:56)  POCT Blood Glucose.: 126 mg/dL (10 Mar 2021 14:06)  POCT Blood Glucose.: 133 mg/dL (10 Mar 2021 13:17)  POCT Blood Glucose.: 212 mg/dL (10 Mar 2021 12:13)  POCT Blood Glucose.: 244 mg/dL (10 Mar 2021 11:04)  POCT Blood Glucose.: 252 mg/dL (10 Mar 2021 10:08)  POCT Blood Glucose.: 277 mg/dL (10 Mar 2021 07:33)    CAPILLARY BLOOD GLUCOSE      POCT Blood Glucose.: 163 mg/dL (10 Mar 2021 21:18)    I&O's Summary    09 Mar 2021 07:01  -  10 Mar 2021 07:00  --------------------------------------------------------  IN: 228 mL / OUT: 275 mL / NET: -47 mL    10 Mar 2021 07:01  -  11 Mar 2021 06:51  --------------------------------------------------------  IN: 1248 mL / OUT: 1080 mL / NET: 168 mL      Daily     Daily Weight in k.4 (11 Mar 2021 05:00)    PHYSICAL EXAMINATION:  General: WN/WD NAD  HEENT: PERRLA, EOMI, moist mucous membranes  Neurology: A&Ox3, nonfocal, QUIROZ x 4  Respiratory: CTA B/L, normal respiratory effort, no wheezes, crackles, rales  CV: RRR, S1S2, no murmurs, rubs or gallops  Abdominal: Soft, ND +BS, Last BM 3/11. Diffuse tenderness to palpation in b/l upper quadrants. No rebound tenderness. Mild voluntary guarding.   Extremities: No edema, + peripheral pulses  Tubes: left fem IABP     LABS:                          15.3   15.02 )-----------( 262      ( 11 Mar 2021 00:28 )             48.1     03-    142  |  102  |  21  ----------------------------<  125<H>  3.9   |  27  |  1.12    Ca    9.8      11 Mar 2021 00:28  Phos  3.7     -  Mg     2.4         TPro  7.5  /  Alb  4.2  /  TBili  0.6  /  DBili  x   /  AST  28  /  ALT  36  /  AlkPhos  119  03-11    LIVER FUNCTIONS - ( 11 Mar 2021 00:28 )  Alb: 4.2 g/dL / Pro: 7.5 g/dL / ALK PHOS: 119 U/L / ALT: 36 U/L / AST: 28 U/L / GGT: x           PT/INR - ( 11 Mar 2021 00:28 )   PT: 12.1 sec;   INR: 1.01 ratio       PTT - ( 11 Mar 2021 00:28 )  PTT:61.7 sec    TELEMETRY: sinus nawaf with occasional PVCs

## 2021-03-11 NOTE — PROGRESS NOTE ADULT - ASSESSMENT
65M with PMH of HTN, T2DM, HLD, hypothyroidism, chronic pancreatitis p/w chest pain x 2 weeks, found with inferolateral wall hypokinesis with inferior TWI on EKG as outpt, admitted for c/f ACS.     CAD/  Unstable angina.   - s/p ASA and Brilinta load, started on heparin gtt Hold Brilinta for CABG    - BB  - Lipitor  - SLN for CP as needed.- TTE  - IABP    Essential hypertension.   - start lopressor 25mg BID for now   - monitor routinely.    Type 2 diabetes mellitus with hyperglycemia, with long-term current use of insulin.  - start on lantus 40units and 10units with meals   - titrate based on FS   - low ISS and monitor FS ac and hs.    Hypothyroidism.    - continue Synthroid  - follow TSH.    Chronic pancreatitis.   - chronic abd pain, lipase mildly elevated  - c/t monitor  - c/w pain control.    SBO partial  - surgical eval. No intervention.     HLD (hyperlipidemia).   - high intensity statin.    CCU care    Sriram Flores MD pager [phone]

## 2021-03-11 NOTE — DIETITIAN INITIAL EVALUATION ADULT. - PROBLEM SELECTOR PLAN 3
check A1c  start on lantus 40units and 10units with meals   titrate based on FS   low ISS and monitor FS ac and hs

## 2021-03-11 NOTE — CONSULT NOTE ADULT - SUBJECTIVE AND OBJECTIVE BOX
CC: 65y old Male admitted with a chief complaint of chest pain, now with abd. pain     HPI:  65M with PMH of HTN, T2DM, HLD, hypothyroidism, chronic pancreatitis p/w chest pain. Pt states he has been having CP for past 2 weeks - pain was initially only with exertion, located midsternal/epigastric region, burning pain with occasional radiation to the jaw and associated with DUARTE. Patient currently admitted to CCU for unstable angina with intraaortic balloon bump. Patient needs CABG planned for tomorrow. Surgery consulted to evaluate patient for abdominal pain. Patient had has chronic abdominal pain with a hx of chronic pancreatitis. Surgical hx includes cholecystectomy and pancreatic rsxn (pancreatic head resection possible Yanes procedure) years ago. Patient reports chronic abdominal pain for years. Currently pain improved from yesterday. Tolerating diet, without nausea or vomiting. Last BM yesterday.      PMHx: Chronic pancreatitis    Hypothyroidism    HLD (hyperlipidemia)    HTN (hypertension)    DM (diabetes mellitus)      PSHx: No significant past surgical history      Medications (inpatient): aspirin  chewable 81 milliGRAM(s) Oral daily  atorvastatin 80 milliGRAM(s) Oral at bedtime  cefuroxime  IVPB 1500 milliGRAM(s) IV Intermittent once  chlorhexidine 0.12% Liquid 30 milliLiter(s) Swish and Spit once  chlorhexidine 2% Cloths 1 Application(s) Topical <User Schedule>  chlorhexidine 4% Liquid 1 Application(s) Topical once  dextrose 40% Gel 15 Gram(s) Oral once  dextrose 5%. 1000 milliLiter(s) IV Continuous <Continuous>  dextrose 5%. 1000 milliLiter(s) IV Continuous <Continuous>  dextrose 50% Injectable 50 milliLiter(s) IV Push every 15 minutes  glucagon  Injectable 1 milliGRAM(s) IntraMuscular once  heparin  Infusion. 1000 Unit(s)/Hr IV Continuous <Continuous>  hydrALAZINE 25 milliGRAM(s) Oral three times a day  insulin glargine Injectable (LANTUS) 40 Unit(s) SubCutaneous at bedtime  insulin lispro (ADMELOG) corrective regimen sliding scale   SubCutaneous three times a day before meals  insulin lispro (ADMELOG) corrective regimen sliding scale   SubCutaneous at bedtime  insulin lispro Injectable (ADMELOG) 10 Unit(s) SubCutaneous three times a day before meals  latanoprost 0.005% Ophthalmic Solution 1 Drop(s) Both EYES at bedtime  levothyroxine 100 MICROGram(s) Oral daily  metoprolol tartrate 50 milliGRAM(s) Oral three times a day  mupirocin 2% Ointment 1 Application(s) Topical two times a day  potassium chloride    Tablet ER 10 milliEquivalent(s) Oral daily    Medications (PRN):acetaminophen 300 mG/codeine 30 mG 1 Tablet(s) Oral every 4 hours PRN  BACItracin   Ointment 1 Application(s) Topical every 12 hours PRN    Allergies: morphine (Urticaria)  (Intolerances: )  Social Hx:   Family Hx: No pertinent family history in first degree relatives        Physical Exam  T(C): 36.8  HR: 66 (54 - 92)  BP: --  RR: 26 (13 - 42)  SpO2: 97% (84% - 97%)  Tmax: T(C): , Max: 37 (03-11-21 @ 00:00)    03-10-21  -  03-11-21  --------------------------------------------------------  IN:    Heparin Infusion: 288 mL    Insulin: 34 mL    Nitroglycerin: 114 mL    Oral Fluid: 830 mL  Total IN: 1266 mL    OUT:    Voided (mL): 1080 mL  Total OUT: 1080 mL    Total NET: 186 mL      03-11-21  -  03-11-21  --------------------------------------------------------  IN:    Heparin Infusion: 108 mL    Nitroglycerin: 12 mL    Oral Fluid: 240 mL  Total IN: 360 mL    OUT:    Voided (mL): 1725 mL  Total OUT: 1725 mL    Total NET: -1365 mL        General: well developed, well nourished, NAD  Neuro: alert and oriented, no focal deficits, moves all extremities spontaneously  HEENT: NCAT, EOMI, anicteric, mucosa moist  Respiratory: airway patent, respirations unlabored  CVS: regular rate and rhythm  Abdomen: soft, mild TTP in epigastric region, mildly distended  Extremities: no edema, sensation and movement grossly intact  Skin: warm, dry, appropriate color    Labs:                        14.8   14.39 )-----------( 211      ( 11 Mar 2021 17:46 )             46.5     PT/INR - ( 11 Mar 2021 00:28 )   PT: 12.1 sec;   INR: 1.01 ratio         PTT - ( 11 Mar 2021 00:28 )  PTT:61.7 sec  03-11    142  |  102  |  21  ----------------------------<  125<H>  3.9   |  27  |  1.12    Ca    9.8      11 Mar 2021 00:28  Phos  3.7     03-11  Mg     2.4     03-11    TPro  7.5  /  Alb  4.2  /  TBili  0.6  /  DBili  x   /  AST  28  /  ALT  36  /  AlkPhos  119  03-11            Imaging and other studies:  < from: CT Abdomen and Pelvis w/ IV Cont (03.11.21 @ 14:12) >    PROCEDURE:  CT of the Abdomen and Pelvis was performed.  Sagittal and coronal reformats were performed.    FINDINGS:  LOWER CHEST: Calcified granuloma right upper lobe. Linear atelectasis both lower lobes. Intra-aortic balloon pump with distal tip overlapping the origins of the celiac and superior mesenteric arteries.    LIVER: Within normal limits.  BILE DUCTS: Pneumobilia. No duct dilatation.  GALLBLADDER: Contracted.  SPLEEN: Within normal limits.  PANCREAS: Parenchymal atrophy. Ductal dilatation with intraductal calculus. No peripancreatic inflammatory change.  ADRENALS: Within normal limits.  KIDNEYS/URETERS: Within normal limits.    BLADDER: Within normal limits.  REPRODUCTIVE ORGANS: Prostate and seminal vesicles appear unremarkable.    BOWEL: Duodenal dilatation. Dilated segment of proximal jejunum appears secondary to segmental bowel resection. Internal hernia with jejunal loops absent from the left upper quadrant and prolapsed into the lesser sac. Moderate dilatation of distal jejunum and proximal ileum with fecalized bowel contents and transition point to nondilated bowel in the mid pelvis (3, 107) consistent with partial small bowel obstruction. Appendix not visualized.  PERITONEUM: No ascites.  VESSELS: Aortic calcifications. Small right common femoral artery pseudoaneurysm (3, 140)  RETROPERITONEUM/LYMPH NODES: No lymphadenopathy.  ABDOMINAL WALL: Within normal limits.  BONES: Within normal limits.    IMPRESSION:  Suspected left paraduodenal internal hernia.    Focally dilated segment of proximal jejunum appears related to prior bowel resection. Correlate with surgical history.    Partial small bowel obstruction of the mid ileum distal to and separate from the internal hernia. No evidence of bowel ischemia.    Appearance of the pancreas consistent with history of chronic pancreatitis without evidence of acute exacerbation.    Distal tip of the intra-aortic balloon pump overlaps the origins of the celiac and superior mesenteric arteries.    Small right common femoral artery pseudoaneurysm.

## 2021-03-11 NOTE — PROGRESS NOTE ADULT - ATTENDING COMMENTS
I have personally seen, examined and participated in the care of this patient. I have reviewed all pertinent clinical information, including history, physical exam, plan and the resident's note. I agree with the resident's note with the following additions:    Chronic pancreatitis was found to have multi vessel coronary disease with ongoing chest pain requiring IABP  Nitroglycerin infusion for chest pain, now resolved, and hypertension - continue Nitroglycerin  ASA, holding P2Y12 inhibitor pending CABG - P2Y12 level 190  CT Surgery has been consulted, pending OR tomorrow  HR 60s on beta blocker - titrate up as tolerated, can increase Hydralazine if hypertensive  O2 sats mid 90s on room air  DASH/diabetic diet  Normal renal function, compensated on exam  H/H acceptable on Heparin drip  Afebrile, no antibiotics  Sugars controlled on Lantus  On Dilaudid prn and outpatient Tylenol with Codeine for chronic pancreatitis   Lipase negative but still having abdominal pain - check abdominal CT and consult Surgery   IABP 3/9    The patient required critical care management and I personally provided 75 minutes of non-continuous care to the patient concurrently with the resident/fellow/nurse practitioner, excluding separate procedures and time spent teaching, in addition to discussing the patient and plan at length with the CICU staff and helping coordinate care. I have personally seen, examined and participated in the care of this patient. I have reviewed all pertinent clinical information, including history, physical exam, plan and the resident's note. I agree with the resident's note with the following additions:    Chronic pancreatitis was found to have multi vessel coronary disease with ongoing chest pain requiring IABP  Nitroglycerin infusion for chest pain, now resolved, and hypertension - continue Nitroglycerin  ASA, holding P2Y12 inhibitor pending CABG - P2Y12 level 190  CT Surgery has been consulted, pending OR tomorrow  HR 60s on beta blocker - titrate up as tolerated, can increase Hydralazine if hypertensive  O2 sats mid 90s on room air  DASH/diabetic diet  Normal renal function, compensated on exam  H/H acceptable on Heparin drip  Afebrile, no antibiotics  Sugars controlled on Lantus  On Dilaudid prn and outpatient Tylenol with Codeine for chronic pancreatitis   Lipase negative but still having abdominal pain - abdominal CT with partial SBO  Surgery consulted and says no intervention at this time, proceed to CABG  IABP 3/9    The patient required critical care management and I personally provided 75 minutes of non-continuous care to the patient concurrently with the resident/fellow/nurse practitioner, excluding separate procedures and time spent teaching, in addition to discussing the patient and plan at length with the CICU staff and helping coordinate care.

## 2021-03-11 NOTE — DIETITIAN INITIAL EVALUATION ADULT. - PHYSCIAL ASSESSMENT
Skin: no pressure injuries   Pt agreeable to nutrition focused physical exam, pt c some age related changes but no significant muscle/fat loss noted at this time well nourished

## 2021-03-11 NOTE — PROGRESS NOTE ADULT - SUBJECTIVE AND OBJECTIVE BOX
Cardiac Surgery Pre-op Note:  CC: Patient is a 65y old  Male who presents with a chief complaint of chest pain; per CICU team ,pt c NSTEMI, S/P LHC, found to have triple vessel disease, required IABP, awaiting CABG. Pt also c h/o chronic pancreatitis. (11 Mar 2021 10:10)      Referring Physician:  Dr Flores, Dr Burgess , Dr Hines                                                                                           Surgeon: DR Perkins   Procedure: (Date) (Procedure)    Allergies    morphine (Urticaria)    Intolerances      HPI:  65M with PMH of HTN, T2DM, HLD, hypothyroidism, chronic pancreatitis p/w chest pain. Pt states he has been having CP for past 2 weeks - pain was initially only with exertion, located midsternal/epigastric region, burning pain with occasional radiation to the jaw and associated with DUARTE. Pain would improve after about 5 minutes of rest. However, in past few days pt has started having pain even at rest. Denies any associated nausea/vomiting, diaphoresis, palpitations, syncope/lightheadedness. Pt went to see cardiologist for these symptoms and TTE showed inferolateral wall hypokinesis and referred to ED for possible cath. Pt had a normal stress test 2 years ago for pore-op for RLE arthrocentesis. ROS positive for chronic abd pain 2/2 chronic pancreatitis. Prior smoker, quit 1992.     Pt does not know home medications.  (09 Mar 2021 02:17)      PAST MEDICAL & SURGICAL HISTORY:  Chronic pancreatitis    Hypothyroidism    HLD (hyperlipidemia)    HTN (hypertension)    DM (diabetes mellitus)    No significant past surgical history        MEDICATIONS  (STANDING):  aspirin  chewable 81 milliGRAM(s) Oral daily  atorvastatin 80 milliGRAM(s) Oral at bedtime  chlorhexidine 2% Cloths 1 Application(s) Topical <User Schedule>  dextrose 40% Gel 15 Gram(s) Oral once  dextrose 5%. 1000 milliLiter(s) (50 mL/Hr) IV Continuous <Continuous>  dextrose 5%. 1000 milliLiter(s) (100 mL/Hr) IV Continuous <Continuous>  dextrose 50% Injectable 50 milliLiter(s) IV Push every 15 minutes  glucagon  Injectable 1 milliGRAM(s) IntraMuscular once  heparin  Infusion. 1000 Unit(s)/Hr (10 mL/Hr) IV Continuous <Continuous>  hydrALAZINE 10 milliGRAM(s) Oral three times a day  insulin glargine Injectable (LANTUS) 40 Unit(s) SubCutaneous at bedtime  insulin lispro (ADMELOG) corrective regimen sliding scale   SubCutaneous three times a day before meals  insulin lispro (ADMELOG) corrective regimen sliding scale   SubCutaneous at bedtime  insulin lispro Injectable (ADMELOG) 10 Unit(s) SubCutaneous three times a day before meals  latanoprost 0.005% Ophthalmic Solution 1 Drop(s) Both EYES at bedtime  levothyroxine 100 MICROGram(s) Oral daily  metoprolol tartrate 50 milliGRAM(s) Oral three times a day  potassium chloride    Tablet ER 10 milliEquivalent(s) Oral daily    MEDICATIONS  (PRN):  acetaminophen 300 mG/codeine 30 mG 1 Tablet(s) Oral every 4 hours PRN Moderate Pain (4 - 6)  BACItracin   Ointment 1 Application(s) Topical every 12 hours PRN Pain/itching      Labs:                        15.3   15.02 )-----------( 262      ( 11 Mar 2021 00:28 )             48.1     03-11    142  |  102  |  21  ----------------------------<  125<H>  3.9   |  27  |  1.12    Ca    9.8      11 Mar 2021 00:28  Phos  3.7     03-11  Mg     2.4     03-11    TPro  7.5  /  Alb  4.2  /  TBili  0.6  /  DBili  x   /  AST  28  /  ALT  36  /  AlkPhos  119  03-11    PT/INR - ( 11 Mar 2021 00:28 )   PT: 12.1 sec;   INR: 1.01 ratio         PTT - ( 11 Mar 2021 00:28 )  PTT:61.7 sec    Blood Type: ABO Interpretation: O (03-08 @ 23:45)    HGB A1C:   Prealbumin:   Pro-BNP: Serum Pro-Brain Natriuretic Peptide: 150 pg/mL (03-08 @ 21:31)    Thyroid Panel: 03-09 @ 10:36/3.45  --/--/--    MRSA:  / MSSA:       CXR:     EKG:    Carotid Duplex:    < from: [facility] Duplex Carotid, Bilat (03.10.21 @ 14:38) >  IMPRESSION: No hemodynamically significant internal carotid artery stenoses are present.    There are flow-limiting stenoses of the right and left external carotid arteries.    Measurement of carotid stenosis is based on velocity parameters that correlate the residual internal carotid diameter with that of the more distal vessel in accordance with a method such as the [place] [employer] Symptomatic Carotid Endarterectomy Trial (NASCET).        < end of copied text >    PFT's:    Echocardiogram:  `< from: TTE with Doppler (w/Cont) (03.10.21 @ 06:23) >  Conclusions:  1. Normal mitral valve. No mitral regurgitation seen.  2. Normal trileaflet aortic valve. No aortic valve  regurgitation seen.  3. Overall preserved left ventricular systolic function.  Endocardial visualization enhanced with intravenous  injection of Ultrasonic Enhancing Agent (Definity). No left  ventricular thrombus. The basal  inferolateral wall is  hypokinetic.  4. Mild diastolic dysfunction (Stage I).  5. Normalright ventricular size and function.  6. Normal pericardium with no pericardial effusion.  *** No previous Echo exam.    < end of copied text >    Cardiac catheterization:  `< from: Cardiac Cath Lab - Adult (03.09.21 @ 11:22) >  VENTRICLES: Analysis of regional contractile function demonstrated mild  diaphragmatic hypokinesis and severe posterobasal hypokinesis. EF  estimated was 45 %.  VALVES: MITRAL VALVE: The mitral valve exhibited no regurgitation.  CORONARY VESSELS: The coronary circulation is right dominant.  LM:   --  LM: Angiography showed minor luminal irregularities with no flow  limiting lesions.  LAD:   --  Ostial LAD: There was a 90 % stenosis. e ccentric  --  Mid LAD: There was a 60 % stenosis.  CX:   --  OM1: There was a 70 % stenosis.  --  OM2: There was a 90 % stenosis in the proximal third of the vessel  segment.  RCA: --  Mid RCA: There was a tubular 95 % stenosis. The lesion was  eccentric and moderately calcified. Tortuous  COMPLICATIONS: There were no complications.  DIAGNOSTIC IMPRESSIONS: Patient has inferobasal hypokinesia with severe mid  RCA disease and severe ostial LAD disease and severe disease of om and om  DIAGNOSTIC RECOMMENDATIONS: CT surgical consultation re: urgent CABG  Prepared and signed by  Butch Hines M.D.  Signed 03/09/2021 12:10:39    < end of copied text >          Gen: WN/WD NAD  Neuro: AAOx3, nonfocal  Pulm: CTA B/L  CV: RRR, S1S2 no murmur  Abd: Soft, NT, ND +BS  Ext: No edema, + peripheral pulses  +IABP  no abd pain      Pt has AICD/PPM [ ] Yes  [x ] No             Brand Name:  Pre-op Beta Blocker ordered within 24 hrs of surgery (CABG ONLY)?  [x ] Yes  [ ] No  If not, Why?  Type & Cross  [x ] Yes  [ ] No  NPO after Midnight [x ] Yes  [ ] No  Pre-op ABX ordered, to be taped on chart:  [ x] Yes  [ ] No     Hibiclens/Peridex ordered [x ] Yes  [ ] No  Intraop on Hold: PRBCs, CXR, ROSIO [x ]   Consent obtained  [ ] Yes  [ ] No   Cardiac Surgery Pre-op Note:  CC: Patient is a 65y old  Male who presents with a chief complaint of chest pain; per CICU team ,pt c NSTEMI, S/P LHC, found to have triple vessel disease, required IABP, awaiting CABG. Pt also c h/o chronic pancreatitis. (11 Mar 2021 10:10)      Referring Physician:  Dr Flores, Dr Burgess , Dr Hines                                                                                           Surgeon: DR Perkins   Procedure: (Date) (Procedure)  CABG 3/12  Allergies    morphine (Urticaria)    Intolerances      HPI:  65M with PMH of HTN, T2DM, HLD, hypothyroidism, chronic pancreatitis p/w chest pain. Pt states he has been having CP for past 2 weeks - pain was initially only with exertion, located midsternal/epigastric region, burning pain with occasional radiation to the jaw and associated with DUARTE. Pain would improve after about 5 minutes of rest. However, in past few days pt has started having pain even at rest. Denies any associated nausea/vomiting, diaphoresis, palpitations, syncope/lightheadedness. Pt went to see cardiologist for these symptoms and TTE showed inferolateral wall hypokinesis and referred to ED for possible cath. Pt had a normal stress test 2 years ago for pore-op for RLE arthrocentesis. ROS positive for chronic abd pain 2/2 chronic pancreatitis. Prior smoker, quit 1992.     Pt does not know home medications.  (09 Mar 2021 02:17)      PAST MEDICAL & SURGICAL HISTORY:  Chronic pancreatitis    Hypothyroidism    HLD (hyperlipidemia)    HTN (hypertension)    DM (diabetes mellitus)    No significant past surgical history        MEDICATIONS  (STANDING):  aspirin  chewable 81 milliGRAM(s) Oral daily  atorvastatin 80 milliGRAM(s) Oral at bedtime  chlorhexidine 2% Cloths 1 Application(s) Topical <User Schedule>  dextrose 40% Gel 15 Gram(s) Oral once  dextrose 5%. 1000 milliLiter(s) (50 mL/Hr) IV Continuous <Continuous>  dextrose 5%. 1000 milliLiter(s) (100 mL/Hr) IV Continuous <Continuous>  dextrose 50% Injectable 50 milliLiter(s) IV Push every 15 minutes  glucagon  Injectable 1 milliGRAM(s) IntraMuscular once  heparin  Infusion. 1000 Unit(s)/Hr (10 mL/Hr) IV Continuous <Continuous>  hydrALAZINE 10 milliGRAM(s) Oral three times a day  insulin glargine Injectable (LANTUS) 40 Unit(s) SubCutaneous at bedtime  insulin lispro (ADMELOG) corrective regimen sliding scale   SubCutaneous three times a day before meals  insulin lispro (ADMELOG) corrective regimen sliding scale   SubCutaneous at bedtime  insulin lispro Injectable (ADMELOG) 10 Unit(s) SubCutaneous three times a day before meals  latanoprost 0.005% Ophthalmic Solution 1 Drop(s) Both EYES at bedtime  levothyroxine 100 MICROGram(s) Oral daily  metoprolol tartrate 50 milliGRAM(s) Oral three times a day  potassium chloride    Tablet ER 10 milliEquivalent(s) Oral daily    MEDICATIONS  (PRN):  acetaminophen 300 mG/codeine 30 mG 1 Tablet(s) Oral every 4 hours PRN Moderate Pain (4 - 6)  BACItracin   Ointment 1 Application(s) Topical every 12 hours PRN Pain/itching      Labs:                        15.3   15.02 )-----------( 262      ( 11 Mar 2021 00:28 )             48.1     03-11    142  |  102  |  21  ----------------------------<  125<H>  3.9   |  27  |  1.12    Ca    9.8      11 Mar 2021 00:28  Phos  3.7     03-11  Mg     2.4     03-11    TPro  7.5  /  Alb  4.2  /  TBili  0.6  /  DBili  x   /  AST  28  /  ALT  36  /  AlkPhos  119  03-11    PT/INR - ( 11 Mar 2021 00:28 )   PT: 12.1 sec;   INR: 1.01 ratio         PTT - ( 11 Mar 2021 00:28 )  PTT:61.7 sec    Blood Type: ABO Interpretation: O (03-08 @ 23:45)    HGB A1C:   Prealbumin:   Pro-BNP: Serum Pro-Brain Natriuretic Peptide: 150 pg/mL (03-08 @ 21:31)    Thyroid Panel: 03-09 @ 10:36/3.45  --/--/--    MRSA:  / MSSA:       CXR:     EKG:    Carotid Duplex:    < from: [facility] Duplex Carotid, Bilat (03.10.21 @ 14:38) >  IMPRESSION: No hemodynamically significant internal carotid artery stenoses are present.    There are flow-limiting stenoses of the right and left external carotid arteries.    Measurement of carotid stenosis is based on velocity parameters that correlate the residual internal carotid diameter with that of the more distal vessel in accordance with a method such as the [employer] Symptomatic Carotid Endarterectomy Trial (NASCET).        < end of copied text >    PFT's:    Echocardiogram:  `< from: TTE with Doppler (w/Cont) (03.10.21 @ 06:23) >  Conclusions:  1. Normal mitral valve. No mitral regurgitation seen.  2. Normal trileaflet aortic valve. No aortic valve  regurgitation seen.  3. Overall preserved left ventricular systolic function.  Endocardial visualization enhanced with intravenous  injection of Ultrasonic Enhancing Agent (Definity). No left  ventricular thrombus. The basal  inferolateral wall is  hypokinetic.  4. Mild diastolic dysfunction (Stage I).  5. Normalright ventricular size and function.  6. Normal pericardium with no pericardial effusion.  *** No previous Echo exam.    < end of copied text >    Cardiac catheterization:  `< from: Cardiac Cath Lab - Adult (03.09.21 @ 11:22) >  VENTRICLES: Analysis of regional contractile function demonstrated mild  diaphragmatic hypokinesis and severe posterobasal hypokinesis. EF  estimated was 45 %.  VALVES: MITRAL VALVE: The mitral valve exhibited no regurgitation.  CORONARY VESSELS: The coronary circulation is right dominant.  LM:   --  LM: Angiography showed minor luminal irregularities with no flow  limiting lesions.  LAD:   --  Ostial LAD: There was a 90 % stenosis. e ccentric  --  Mid LAD: There was a 60 % stenosis.  CX:   --  OM1: There was a 70 % stenosis.  --  OM2: There was a 90 % stenosis in the proximal third of the vessel  segment.  RCA: --  Mid RCA: There was a tubular 95 % stenosis. The lesion was  eccentric and moderately calcified. Tortuous  COMPLICATIONS: There were no complications.  DIAGNOSTIC IMPRESSIONS: Patient has inferobasal hypokinesia with severe mid  RCA disease and severe ostial LAD disease and severe disease of om and om  DIAGNOSTIC RECOMMENDATIONS: CT surgical consultation re: urgent CABG  Prepared and signed by  Butch Hines M.D.  Signed 03/09/2021 12:10:39    < end of copied text >          Gen: WN/WD NAD  Neuro: AAOx3, nonfocal  Pulm: CTA B/L  CV: RRR, S1S2 no murmur  Abd: Soft, NT, ND +BS  Ext: No edema, + peripheral pulses  +IABP  no abd pain      Pt has AICD/PPM [ ] Yes  [x ] No             Brand Name:  Pre-op Beta Blocker ordered within 24 hrs of surgery (CABG ONLY)?  [x ] Yes  [ ] No  If not, Why?  Type & Cross  [x ] Yes  [ ] No  NPO after Midnight [x ] Yes  [ ] No  Pre-op ABX ordered, to be taped on chart:  [ x] Yes  [ ] No     Hibiclens/Peridex ordered [x ] Yes  [ ] No  Intraop on Hold: PRBCs, CXR, ROSIO [x ]   Consent obtained  [ ] Yes  [ ] No

## 2021-03-11 NOTE — DIETITIAN INITIAL EVALUATION ADULT. - PROBLEM SELECTOR PLAN 1
CP x 2 weeks, inferolateral wall hypokinesis with inferior TWI on EKG. High risk given hx of HTN, T2DM, HLD, former smoker.    - s/p ASA and Brilinta load, started on heparin gtt   - hs trop 36; will repeat trop/CK/CKMB, monitor on tele   - c/w ASA 81mg and Brilinta 90mg BID, c/w hep gtt   - start on high dose lipitor and lopressor 25mg BID   - check TSH, lipid, A1c  - check TTE  - monitor on tele   - possible cath in AM    - SLN for CP as needed

## 2021-03-12 ENCOUNTER — APPOINTMENT (OUTPATIENT)
Dept: CARDIOTHORACIC SURGERY | Facility: HOSPITAL | Age: 66
End: 2021-03-12

## 2021-03-12 PROBLEM — I10 ESSENTIAL (PRIMARY) HYPERTENSION: Chronic | Status: ACTIVE | Noted: 2021-03-08

## 2021-03-12 PROBLEM — E78.5 HYPERLIPIDEMIA, UNSPECIFIED: Chronic | Status: ACTIVE | Noted: 2021-03-08

## 2021-03-12 PROBLEM — E11.9 TYPE 2 DIABETES MELLITUS WITHOUT COMPLICATIONS: Chronic | Status: ACTIVE | Noted: 2021-03-08

## 2021-03-12 PROBLEM — Z00.00 ENCOUNTER FOR PREVENTIVE HEALTH EXAMINATION: Status: ACTIVE | Noted: 2021-03-12

## 2021-03-12 PROBLEM — E03.9 HYPOTHYROIDISM, UNSPECIFIED: Chronic | Status: ACTIVE | Noted: 2021-03-08

## 2021-03-12 PROBLEM — K86.1 OTHER CHRONIC PANCREATITIS: Chronic | Status: ACTIVE | Noted: 2021-03-08

## 2021-03-12 LAB
ALBUMIN SERPL ELPH-MCNC: 3.2 G/DL — LOW (ref 3.3–5)
ALBUMIN SERPL ELPH-MCNC: 3.3 G/DL — SIGNIFICANT CHANGE UP (ref 3.3–5)
ALP SERPL-CCNC: 106 U/L — SIGNIFICANT CHANGE UP (ref 40–120)
ALP SERPL-CCNC: 79 U/L — SIGNIFICANT CHANGE UP (ref 40–120)
ALT FLD-CCNC: 23 U/L — SIGNIFICANT CHANGE UP (ref 10–45)
ALT FLD-CCNC: 30 U/L — SIGNIFICANT CHANGE UP (ref 10–45)
ANION GAP SERPL CALC-SCNC: 10 MMOL/L — SIGNIFICANT CHANGE UP (ref 5–17)
ANION GAP SERPL CALC-SCNC: 8 MMOL/L — SIGNIFICANT CHANGE UP (ref 5–17)
APTT BLD: 30.8 SEC — SIGNIFICANT CHANGE UP (ref 27.5–35.5)
APTT BLD: 69.4 SEC — HIGH (ref 27.5–35.5)
AST SERPL-CCNC: 22 U/L — SIGNIFICANT CHANGE UP (ref 10–40)
AST SERPL-CCNC: 51 U/L — HIGH (ref 10–40)
BASE EXCESS BLDV CALC-SCNC: -3.1 MMOL/L — LOW (ref -2–2)
BASE EXCESS BLDV CALC-SCNC: 1.1 MMOL/L — SIGNIFICANT CHANGE UP (ref -2–2)
BASE EXCESS BLDV CALC-SCNC: 1.5 MMOL/L — SIGNIFICANT CHANGE UP (ref -2–2)
BASOPHILS # BLD AUTO: 0.04 K/UL — SIGNIFICANT CHANGE UP (ref 0–0.2)
BASOPHILS # BLD AUTO: 0.06 K/UL — SIGNIFICANT CHANGE UP (ref 0–0.2)
BASOPHILS NFR BLD AUTO: 0.3 % — SIGNIFICANT CHANGE UP (ref 0–2)
BASOPHILS NFR BLD AUTO: 0.5 % — SIGNIFICANT CHANGE UP (ref 0–2)
BILIRUB SERPL-MCNC: 0.5 MG/DL — SIGNIFICANT CHANGE UP (ref 0.2–1.2)
BILIRUB SERPL-MCNC: 1.2 MG/DL — SIGNIFICANT CHANGE UP (ref 0.2–1.2)
BLOOD GAS VENOUS - CREATININE: SIGNIFICANT CHANGE UP MG/DL (ref 0.5–1.3)
BUN SERPL-MCNC: 15 MG/DL — SIGNIFICANT CHANGE UP (ref 7–23)
BUN SERPL-MCNC: 17 MG/DL — SIGNIFICANT CHANGE UP (ref 7–23)
CA-I SERPL-SCNC: 0.94 MMOL/L — LOW (ref 1.12–1.3)
CA-I SERPL-SCNC: 0.96 MMOL/L — LOW (ref 1.12–1.3)
CA-I SERPL-SCNC: 0.97 MMOL/L — LOW (ref 1.12–1.3)
CALCIUM SERPL-MCNC: 8.7 MG/DL — SIGNIFICANT CHANGE UP (ref 8.4–10.5)
CALCIUM SERPL-MCNC: 9.2 MG/DL — SIGNIFICANT CHANGE UP (ref 8.4–10.5)
CHLORIDE BLDV-SCNC: SIGNIFICANT CHANGE UP MMOL/L (ref 96–108)
CHLORIDE SERPL-SCNC: 105 MMOL/L — SIGNIFICANT CHANGE UP (ref 96–108)
CHLORIDE SERPL-SCNC: 106 MMOL/L — SIGNIFICANT CHANGE UP (ref 96–108)
CK MB BLD-MCNC: 10.8 % — HIGH (ref 0–3.5)
CK MB CFR SERPL CALC: 62.2 NG/ML — HIGH (ref 0–6.7)
CK SERPL-CCNC: 576 U/L — HIGH (ref 30–200)
CO2 SERPL-SCNC: 20 MMOL/L — LOW (ref 22–31)
CO2 SERPL-SCNC: 23 MMOL/L — SIGNIFICANT CHANGE UP (ref 22–31)
CREAT SERPL-MCNC: 0.87 MG/DL — SIGNIFICANT CHANGE UP (ref 0.5–1.3)
CREAT SERPL-MCNC: 0.96 MG/DL — SIGNIFICANT CHANGE UP (ref 0.5–1.3)
EOSINOPHIL # BLD AUTO: 0.05 K/UL — SIGNIFICANT CHANGE UP (ref 0–0.5)
EOSINOPHIL # BLD AUTO: 0.42 K/UL — SIGNIFICANT CHANGE UP (ref 0–0.5)
EOSINOPHIL NFR BLD AUTO: 0.3 % — SIGNIFICANT CHANGE UP (ref 0–6)
EOSINOPHIL NFR BLD AUTO: 3.8 % — SIGNIFICANT CHANGE UP (ref 0–6)
FIBRINOGEN PPP-MCNC: 536 MG/DL — HIGH (ref 290–520)
GAS PNL BLDA: SIGNIFICANT CHANGE UP
GAS PNL BLDV: 134 MMOL/L — LOW (ref 135–145)
GAS PNL BLDV: 136 MMOL/L — SIGNIFICANT CHANGE UP (ref 135–145)
GAS PNL BLDV: 136 MMOL/L — SIGNIFICANT CHANGE UP (ref 135–145)
GAS PNL BLDV: SIGNIFICANT CHANGE UP
GLUCOSE BLDC GLUCOMTR-MCNC: 131 MG/DL — HIGH (ref 70–99)
GLUCOSE BLDC GLUCOMTR-MCNC: 135 MG/DL — HIGH (ref 70–99)
GLUCOSE BLDC GLUCOMTR-MCNC: 177 MG/DL — HIGH (ref 70–99)
GLUCOSE BLDC GLUCOMTR-MCNC: 285 MG/DL — HIGH (ref 70–99)
GLUCOSE BLDV-MCNC: 146 MG/DL — HIGH (ref 70–99)
GLUCOSE BLDV-MCNC: 147 MG/DL — HIGH (ref 70–99)
GLUCOSE BLDV-MCNC: 151 MG/DL — HIGH (ref 70–99)
GLUCOSE SERPL-MCNC: 211 MG/DL — HIGH (ref 70–99)
GLUCOSE SERPL-MCNC: 248 MG/DL — HIGH (ref 70–99)
HCO3 BLDV-SCNC: 23 MMOL/L — SIGNIFICANT CHANGE UP (ref 21–29)
HCO3 BLDV-SCNC: 27 MMOL/L — SIGNIFICANT CHANGE UP (ref 21–29)
HCO3 BLDV-SCNC: 27 MMOL/L — SIGNIFICANT CHANGE UP (ref 21–29)
HCT VFR BLD CALC: 39.1 % — SIGNIFICANT CHANGE UP (ref 39–50)
HCT VFR BLD CALC: 43.2 % — SIGNIFICANT CHANGE UP (ref 39–50)
HCT VFR BLDA CALC: 30 % — LOW (ref 39–50)
HCT VFR BLDA CALC: 31 % — LOW (ref 39–50)
HCT VFR BLDA CALC: 33 % — LOW (ref 39–50)
HEPARINASE TEG R TIME: 7.4 MIN — SIGNIFICANT CHANGE UP (ref 4.3–8.3)
HGB BLD CALC-MCNC: 10.6 G/DL — LOW (ref 13–17)
HGB BLD CALC-MCNC: 9.8 G/DL — LOW (ref 13–17)
HGB BLD CALC-MCNC: 9.9 G/DL — LOW (ref 13–17)
HGB BLD-MCNC: 12.9 G/DL — LOW (ref 13–17)
HGB BLD-MCNC: 13.7 G/DL — SIGNIFICANT CHANGE UP (ref 13–17)
HOROWITZ INDEX BLDV+IHG-RTO: 0 — SIGNIFICANT CHANGE UP
IMM GRANULOCYTES NFR BLD AUTO: 0.4 % — SIGNIFICANT CHANGE UP (ref 0–1.5)
IMM GRANULOCYTES NFR BLD AUTO: 0.6 % — SIGNIFICANT CHANGE UP (ref 0–1.5)
INR BLD: 1 RATIO — SIGNIFICANT CHANGE UP (ref 0.88–1.16)
INR BLD: 1.11 RATIO — SIGNIFICANT CHANGE UP (ref 0.88–1.16)
LACTATE BLDV-MCNC: 0.7 MMOL/L — SIGNIFICANT CHANGE UP (ref 0.7–2)
LACTATE BLDV-MCNC: 0.9 MMOL/L — SIGNIFICANT CHANGE UP (ref 0.7–2)
LACTATE BLDV-MCNC: 1.1 MMOL/L — SIGNIFICANT CHANGE UP (ref 0.7–2)
LYMPHOCYTES # BLD AUTO: 1.61 K/UL — SIGNIFICANT CHANGE UP (ref 1–3.3)
LYMPHOCYTES # BLD AUTO: 11.2 % — LOW (ref 13–44)
LYMPHOCYTES # BLD AUTO: 3.52 K/UL — HIGH (ref 1–3.3)
LYMPHOCYTES # BLD AUTO: 31.7 % — SIGNIFICANT CHANGE UP (ref 13–44)
MAGNESIUM SERPL-MCNC: 2.1 MG/DL — SIGNIFICANT CHANGE UP (ref 1.6–2.6)
MCHC RBC-ENTMCNC: 27 PG — SIGNIFICANT CHANGE UP (ref 27–34)
MCHC RBC-ENTMCNC: 27.6 PG — SIGNIFICANT CHANGE UP (ref 27–34)
MCHC RBC-ENTMCNC: 31.7 GM/DL — LOW (ref 32–36)
MCHC RBC-ENTMCNC: 33 GM/DL — SIGNIFICANT CHANGE UP (ref 32–36)
MCV RBC AUTO: 83.7 FL — SIGNIFICANT CHANGE UP (ref 80–100)
MCV RBC AUTO: 85.2 FL — SIGNIFICANT CHANGE UP (ref 80–100)
MONOCYTES # BLD AUTO: 0.73 K/UL — SIGNIFICANT CHANGE UP (ref 0–0.9)
MONOCYTES # BLD AUTO: 1.01 K/UL — HIGH (ref 0–0.9)
MONOCYTES NFR BLD AUTO: 6.6 % — SIGNIFICANT CHANGE UP (ref 2–14)
MONOCYTES NFR BLD AUTO: 7 % — SIGNIFICANT CHANGE UP (ref 2–14)
NEUTROPHILS # BLD AUTO: 11.57 K/UL — HIGH (ref 1.8–7.4)
NEUTROPHILS # BLD AUTO: 6.35 K/UL — SIGNIFICANT CHANGE UP (ref 1.8–7.4)
NEUTROPHILS NFR BLD AUTO: 57 % — SIGNIFICANT CHANGE UP (ref 43–77)
NEUTROPHILS NFR BLD AUTO: 80.6 % — HIGH (ref 43–77)
NRBC # BLD: 0 /100 WBCS — SIGNIFICANT CHANGE UP (ref 0–0)
NRBC # BLD: 0 /100 WBCS — SIGNIFICANT CHANGE UP (ref 0–0)
PCO2 BLDV: 47 MMHG — SIGNIFICANT CHANGE UP (ref 35–50)
PCO2 BLDV: 48 MMHG — SIGNIFICANT CHANGE UP (ref 35–50)
PCO2 BLDV: 50 MMHG — SIGNIFICANT CHANGE UP (ref 35–50)
PH BLDV: 7.3 — LOW (ref 7.35–7.45)
PH BLDV: 7.34 — LOW (ref 7.35–7.45)
PH BLDV: 7.36 — SIGNIFICANT CHANGE UP (ref 7.35–7.45)
PHOSPHATE SERPL-MCNC: 2.8 MG/DL — SIGNIFICANT CHANGE UP (ref 2.5–4.5)
PLATELET # BLD AUTO: 114 K/UL — LOW (ref 150–400)
PLATELET # BLD AUTO: 190 K/UL — SIGNIFICANT CHANGE UP (ref 150–400)
PO2 BLDV: 61 MMHG — HIGH (ref 25–45)
PO2 BLDV: 68 MMHG — HIGH (ref 25–45)
PO2 BLDV: 69 MMHG — HIGH (ref 25–45)
POTASSIUM BLDV-SCNC: 3.7 MMOL/L — SIGNIFICANT CHANGE UP (ref 3.5–5)
POTASSIUM BLDV-SCNC: 4 MMOL/L — SIGNIFICANT CHANGE UP (ref 3.5–5)
POTASSIUM BLDV-SCNC: 4.5 MMOL/L — SIGNIFICANT CHANGE UP (ref 3.5–5)
POTASSIUM SERPL-MCNC: 4.2 MMOL/L — SIGNIFICANT CHANGE UP (ref 3.5–5.3)
POTASSIUM SERPL-MCNC: 4.3 MMOL/L — SIGNIFICANT CHANGE UP (ref 3.5–5.3)
POTASSIUM SERPL-SCNC: 4.2 MMOL/L — SIGNIFICANT CHANGE UP (ref 3.5–5.3)
POTASSIUM SERPL-SCNC: 4.3 MMOL/L — SIGNIFICANT CHANGE UP (ref 3.5–5.3)
PROT SERPL-MCNC: 5.3 G/DL — LOW (ref 6–8.3)
PROT SERPL-MCNC: 6.3 G/DL — SIGNIFICANT CHANGE UP (ref 6–8.3)
PROTHROM AB SERPL-ACNC: 12 SEC — SIGNIFICANT CHANGE UP (ref 10.6–13.6)
PROTHROM AB SERPL-ACNC: 13.3 SEC — SIGNIFICANT CHANGE UP (ref 10.6–13.6)
RAPIDTEG MAXIMUM AMPLITUDE: 59.9 MM — SIGNIFICANT CHANGE UP (ref 52–70)
RBC # BLD: 4.67 M/UL — SIGNIFICANT CHANGE UP (ref 4.2–5.8)
RBC # BLD: 5.07 M/UL — SIGNIFICANT CHANGE UP (ref 4.2–5.8)
RBC # FLD: 13.6 % — SIGNIFICANT CHANGE UP (ref 10.3–14.5)
RBC # FLD: 13.6 % — SIGNIFICANT CHANGE UP (ref 10.3–14.5)
SAO2 % BLDV: 91 % — HIGH (ref 67–88)
SAO2 % BLDV: 92 % — HIGH (ref 67–88)
SAO2 % BLDV: 94 % — HIGH (ref 67–88)
SODIUM SERPL-SCNC: 135 MMOL/L — SIGNIFICANT CHANGE UP (ref 135–145)
SODIUM SERPL-SCNC: 137 MMOL/L — SIGNIFICANT CHANGE UP (ref 135–145)
TEG FUNCTIONAL FIBRINOGEN: 19.8 MM — SIGNIFICANT CHANGE UP (ref 15–32)
TEG MAXIMUM AMPLITUDE: 61.2 MM — SIGNIFICANT CHANGE UP (ref 52–69)
TEG REACTION TIME: 7.7 MIN — SIGNIFICANT CHANGE UP (ref 4.6–9.1)
TROPONIN T, HIGH SENSITIVITY RESULT: 792 NG/L — HIGH (ref 0–51)
WBC # BLD: 11.12 K/UL — HIGH (ref 3.8–10.5)
WBC # BLD: 14.37 K/UL — HIGH (ref 3.8–10.5)
WBC # FLD AUTO: 11.12 K/UL — HIGH (ref 3.8–10.5)
WBC # FLD AUTO: 14.37 K/UL — HIGH (ref 3.8–10.5)

## 2021-03-12 PROCEDURE — 33508 ENDOSCOPIC VEIN HARVEST: CPT | Mod: 59

## 2021-03-12 PROCEDURE — 33533 CABG ARTERIAL SINGLE: CPT

## 2021-03-12 PROCEDURE — 71045 X-RAY EXAM CHEST 1 VIEW: CPT | Mod: 26

## 2021-03-12 PROCEDURE — 99291 CRITICAL CARE FIRST HOUR: CPT

## 2021-03-12 PROCEDURE — 33518 CABG ARTERY-VEIN TWO: CPT

## 2021-03-12 RX ORDER — POTASSIUM CHLORIDE 20 MEQ
10 PACKET (EA) ORAL
Refills: 0 | Status: DISCONTINUED | OUTPATIENT
Start: 2021-03-12 | End: 2021-03-13

## 2021-03-12 RX ORDER — ASPIRIN/CALCIUM CARB/MAGNESIUM 324 MG
300 TABLET ORAL ONCE
Refills: 0 | Status: DISCONTINUED | OUTPATIENT
Start: 2021-03-12 | End: 2021-03-13

## 2021-03-12 RX ORDER — SODIUM CHLORIDE 9 MG/ML
1000 INJECTION INTRAMUSCULAR; INTRAVENOUS; SUBCUTANEOUS
Refills: 0 | Status: DISCONTINUED | OUTPATIENT
Start: 2021-03-12 | End: 2021-03-14

## 2021-03-12 RX ORDER — HYDROMORPHONE HYDROCHLORIDE 2 MG/ML
0.5 INJECTION INTRAMUSCULAR; INTRAVENOUS; SUBCUTANEOUS ONCE
Refills: 0 | Status: DISCONTINUED | OUTPATIENT
Start: 2021-03-12 | End: 2021-03-12

## 2021-03-12 RX ORDER — ASPIRIN/CALCIUM CARB/MAGNESIUM 324 MG
81 TABLET ORAL DAILY
Refills: 0 | Status: DISCONTINUED | OUTPATIENT
Start: 2021-03-12 | End: 2021-03-17

## 2021-03-12 RX ORDER — ALBUMIN HUMAN 25 %
250 VIAL (ML) INTRAVENOUS ONCE
Refills: 0 | Status: COMPLETED | OUTPATIENT
Start: 2021-03-12 | End: 2021-03-12

## 2021-03-12 RX ORDER — INSULIN HUMAN 100 [IU]/ML
3 INJECTION, SOLUTION SUBCUTANEOUS
Qty: 100 | Refills: 0 | Status: DISCONTINUED | OUTPATIENT
Start: 2021-03-12 | End: 2021-03-16

## 2021-03-12 RX ORDER — CHLORHEXIDINE GLUCONATE 213 G/1000ML
15 SOLUTION TOPICAL EVERY 12 HOURS
Refills: 0 | Status: DISCONTINUED | OUTPATIENT
Start: 2021-03-12 | End: 2021-03-12

## 2021-03-12 RX ORDER — DEXTROSE 50 % IN WATER 50 %
50 SYRINGE (ML) INTRAVENOUS
Refills: 0 | Status: DISCONTINUED | OUTPATIENT
Start: 2021-03-12 | End: 2021-03-13

## 2021-03-12 RX ORDER — MUPIROCIN 20 MG/G
1 OINTMENT TOPICAL
Refills: 0 | Status: COMPLETED | OUTPATIENT
Start: 2021-03-12 | End: 2021-03-16

## 2021-03-12 RX ORDER — DEXMEDETOMIDINE HYDROCHLORIDE IN 0.9% SODIUM CHLORIDE 4 UG/ML
0.3 INJECTION INTRAVENOUS
Qty: 200 | Refills: 0 | Status: DISCONTINUED | OUTPATIENT
Start: 2021-03-12 | End: 2021-03-13

## 2021-03-12 RX ORDER — ATORVASTATIN CALCIUM 80 MG/1
40 TABLET, FILM COATED ORAL AT BEDTIME
Refills: 0 | Status: DISCONTINUED | OUTPATIENT
Start: 2021-03-12 | End: 2021-03-17

## 2021-03-12 RX ORDER — FENTANYL CITRATE 50 UG/ML
50 INJECTION INTRAVENOUS ONCE
Refills: 0 | Status: DISCONTINUED | OUTPATIENT
Start: 2021-03-12 | End: 2021-03-12

## 2021-03-12 RX ORDER — DEXTROSE 50 % IN WATER 50 %
25 SYRINGE (ML) INTRAVENOUS
Refills: 0 | Status: DISCONTINUED | OUTPATIENT
Start: 2021-03-12 | End: 2021-03-13

## 2021-03-12 RX ORDER — BACITRACIN ZINC 500 UNIT/G
1 OINTMENT IN PACKET (EA) TOPICAL EVERY 12 HOURS
Refills: 0 | Status: DISCONTINUED | OUTPATIENT
Start: 2021-03-12 | End: 2021-03-17

## 2021-03-12 RX ORDER — DEXMEDETOMIDINE HYDROCHLORIDE IN 0.9% SODIUM CHLORIDE 4 UG/ML
0.05 INJECTION INTRAVENOUS
Qty: 400 | Refills: 0 | Status: DISCONTINUED | OUTPATIENT
Start: 2021-03-12 | End: 2021-03-12

## 2021-03-12 RX ORDER — NICARDIPINE HYDROCHLORIDE 30 MG/1
5 CAPSULE, EXTENDED RELEASE ORAL
Qty: 40 | Refills: 0 | Status: DISCONTINUED | OUTPATIENT
Start: 2021-03-12 | End: 2021-03-13

## 2021-03-12 RX ORDER — POTASSIUM CHLORIDE 20 MEQ
10 PACKET (EA) ORAL
Refills: 0 | Status: COMPLETED | OUTPATIENT
Start: 2021-03-12 | End: 2021-03-12

## 2021-03-12 RX ORDER — CHLORHEXIDINE GLUCONATE 213 G/1000ML
1 SOLUTION TOPICAL
Refills: 0 | Status: DISCONTINUED | OUTPATIENT
Start: 2021-03-12 | End: 2021-03-17

## 2021-03-12 RX ORDER — FAMOTIDINE 10 MG/ML
20 INJECTION INTRAVENOUS EVERY 12 HOURS
Refills: 0 | Status: DISCONTINUED | OUTPATIENT
Start: 2021-03-12 | End: 2021-03-13

## 2021-03-12 RX ORDER — LATANOPROST 0.05 MG/ML
1 SOLUTION/ DROPS OPHTHALMIC; TOPICAL AT BEDTIME
Refills: 0 | Status: DISCONTINUED | OUTPATIENT
Start: 2021-03-12 | End: 2021-03-17

## 2021-03-12 RX ORDER — CEFUROXIME AXETIL 250 MG
1500 TABLET ORAL EVERY 8 HOURS
Refills: 0 | Status: COMPLETED | OUTPATIENT
Start: 2021-03-12 | End: 2021-03-14

## 2021-03-12 RX ORDER — POLYETHYLENE GLYCOL 3350 17 G/17G
17 POWDER, FOR SOLUTION ORAL DAILY
Refills: 0 | Status: DISCONTINUED | OUTPATIENT
Start: 2021-03-12 | End: 2021-03-17

## 2021-03-12 RX ORDER — LEVOTHYROXINE SODIUM 125 MCG
100 TABLET ORAL DAILY
Refills: 0 | Status: DISCONTINUED | OUTPATIENT
Start: 2021-03-12 | End: 2021-03-17

## 2021-03-12 RX ADMIN — HEPARIN SODIUM 1200 UNIT(S)/HR: 5000 INJECTION INTRAVENOUS; SUBCUTANEOUS at 06:01

## 2021-03-12 RX ADMIN — Medication 100 MILLIEQUIVALENT(S): at 16:50

## 2021-03-12 RX ADMIN — Medication 100 MILLIGRAM(S): at 23:25

## 2021-03-12 RX ADMIN — Medication 1 TABLET(S): at 06:10

## 2021-03-12 RX ADMIN — FENTANYL CITRATE 50 MICROGRAM(S): 50 INJECTION INTRAVENOUS at 22:05

## 2021-03-12 RX ADMIN — Medication 100 MILLIGRAM(S): at 16:00

## 2021-03-12 RX ADMIN — HYDROMORPHONE HYDROCHLORIDE 0.5 MILLIGRAM(S): 2 INJECTION INTRAMUSCULAR; INTRAVENOUS; SUBCUTANEOUS at 23:02

## 2021-03-12 RX ADMIN — Medication 125 MILLILITER(S): at 14:57

## 2021-03-12 RX ADMIN — Medication 100 MILLIEQUIVALENT(S): at 16:00

## 2021-03-12 RX ADMIN — HYDROMORPHONE HYDROCHLORIDE 0.5 MILLIGRAM(S): 2 INJECTION INTRAMUSCULAR; INTRAVENOUS; SUBCUTANEOUS at 19:23

## 2021-03-12 RX ADMIN — Medication 3: at 06:58

## 2021-03-12 RX ADMIN — FAMOTIDINE 20 MILLIGRAM(S): 10 INJECTION INTRAVENOUS at 17:40

## 2021-03-12 RX ADMIN — LATANOPROST 1 DROP(S): 0.05 SOLUTION/ DROPS OPHTHALMIC; TOPICAL at 22:47

## 2021-03-12 RX ADMIN — MUPIROCIN 1 APPLICATION(S): 20 OINTMENT TOPICAL at 17:41

## 2021-03-12 RX ADMIN — ATORVASTATIN CALCIUM 40 MILLIGRAM(S): 80 TABLET, FILM COATED ORAL at 22:47

## 2021-03-12 RX ADMIN — HYDROMORPHONE HYDROCHLORIDE 0.5 MILLIGRAM(S): 2 INJECTION INTRAMUSCULAR; INTRAVENOUS; SUBCUTANEOUS at 23:32

## 2021-03-12 RX ADMIN — Medication 100 MICROGRAM(S): at 06:02

## 2021-03-12 RX ADMIN — HYDROMORPHONE HYDROCHLORIDE 0.5 MILLIGRAM(S): 2 INJECTION INTRAMUSCULAR; INTRAVENOUS; SUBCUTANEOUS at 19:53

## 2021-03-12 RX ADMIN — Medication 25 MILLIGRAM(S): at 06:02

## 2021-03-12 RX ADMIN — Medication 100 MILLIEQUIVALENT(S): at 15:53

## 2021-03-12 RX ADMIN — CHLORHEXIDINE GLUCONATE 15 MILLILITER(S): 213 SOLUTION TOPICAL at 17:40

## 2021-03-12 RX ADMIN — MUPIROCIN 1 APPLICATION(S): 20 OINTMENT TOPICAL at 06:03

## 2021-03-12 RX ADMIN — FENTANYL CITRATE 50 MICROGRAM(S): 50 INJECTION INTRAVENOUS at 21:35

## 2021-03-12 RX ADMIN — CHLORHEXIDINE GLUCONATE 1 APPLICATION(S): 213 SOLUTION TOPICAL at 06:02

## 2021-03-12 RX ADMIN — Medication 125 MILLILITER(S): at 21:38

## 2021-03-12 RX ADMIN — Medication 1 TABLET(S): at 06:40

## 2021-03-12 RX ADMIN — CHLORHEXIDINE GLUCONATE 30 MILLILITER(S): 213 SOLUTION TOPICAL at 06:02

## 2021-03-12 RX ADMIN — Medication 81 MILLIGRAM(S): at 22:46

## 2021-03-12 NOTE — PROGRESS NOTE ADULT - SUBJECTIVE AND OBJECTIVE BOX
Patient is a 65y old  Male who presents with a chief complaint of chest pain (12 Mar 2021 07:18)      SUBJECTIVE / OVERNIGHT EVENTS: intubated  Review of Systems  unobtainable     MEDICATIONS  (STANDING):  aspirin enteric coated 81 milliGRAM(s) Oral daily  aspirin Suppository 300 milliGRAM(s) Rectal once  atorvastatin 40 milliGRAM(s) Oral at bedtime  cefuroxime  IVPB 1500 milliGRAM(s) IV Intermittent every 8 hours  chlorhexidine 0.12% Liquid 15 milliLiter(s) Oral Mucosa every 12 hours  chlorhexidine 2% Cloths 1 Application(s) Topical <User Schedule>  dexMEDEtomidine Infusion 0.3 MICROgram(s)/kG/Hr (6.64 mL/Hr) IV Continuous <Continuous>  dextrose 50% Injectable 50 milliLiter(s) IV Push every 15 minutes  dextrose 50% Injectable 25 milliLiter(s) IV Push every 15 minutes  famotidine Injectable 20 milliGRAM(s) IV Push every 12 hours  insulin regular Infusion 3 Unit(s)/Hr (3 mL/Hr) IV Continuous <Continuous>  latanoprost 0.005% Ophthalmic Solution 1 Drop(s) Both EYES at bedtime  levothyroxine 100 MICROGram(s) Oral daily  mupirocin 2% Ointment 1 Application(s) Topical two times a day  niCARdipine Infusion 5 mG/Hr (25 mL/Hr) IV Continuous <Continuous>  polyethylene glycol 3350 17 Gram(s) Oral daily  potassium chloride  10 mEq/50 mL IVPB 10 milliEquivalent(s) IV Intermittent every 1 hour  potassium chloride  10 mEq/50 mL IVPB 10 milliEquivalent(s) IV Intermittent every 1 hour  potassium chloride  10 mEq/50 mL IVPB 10 milliEquivalent(s) IV Intermittent every 1 hour  sodium chloride 0.9%. 1000 milliLiter(s) (10 mL/Hr) IV Continuous <Continuous>    MEDICATIONS  (PRN):  BACItracin   Ointment 1 Application(s) Topical every 12 hours PRN Pain/itching      Vital Signs Last 24 Hrs  T(C): 35.8 (12 Mar 2021 16:00), Max: 36.8 (11 Mar 2021 17:00)  T(F): 96.5 (12 Mar 2021 16:00), Max: 98.3 (11 Mar 2021 17:00)  HR: 75 (12 Mar 2021 16:45) (52 - 79)  BP: --  BP(mean): --  RR: 14 (12 Mar 2021 16:45) (12 - 26)  SpO2: 99% (12 Mar 2021 16:45) (92% - 100%)    PHYSICAL EXAM:  GENERAL: intubated  HEAD:  Atraumatic, Normocephalic  EYES: EOMI, PERRLA, conjunctiva and sclera clear  NECK: Supple, No JVD  CHEST/LUNG: Clear to auscultation bilaterally; No wheeze Chest tubes  HEART: Regular rate and rhythm; No murmurs, rubs, or gallops  ABDOMEN: Soft, Nontender, Nondistended; Bowel sounds present  EXTREMITIES:  2+ Peripheral Pulses, No clubbing, cyanosis, or edema  NEUROLOGY: non-focal  SKIN: No rashes or lesions    LABS:                        12.9   14.37 )-----------( 114      ( 12 Mar 2021 14:09 )             39.1     03-12    137  |  106  |  15  ----------------------------<  211<H>  4.2   |  23  |  0.87    Ca    9.2      12 Mar 2021 14:08  Phos  2.8     03-12  Mg     2.1     03-12    TPro  5.3<L>  /  Alb  3.2<L>  /  TBili  1.2  /  DBili  x   /  AST  51<H>  /  ALT  23  /  AlkPhos  79  03-12    PT/INR - ( 12 Mar 2021 14:08 )   PT: 13.3 sec;   INR: 1.11 ratio         PTT - ( 12 Mar 2021 14:08 )  PTT:30.8 sec  CARDIAC MARKERS ( 12 Mar 2021 14:08 )  x     / x     / 576 U/L / x     / 62.2 ng/mL            RADIOLOGY & ADDITIONAL TESTS:    Imaging Personally Reviewed:    Consultant(s) Notes Reviewed:      Care Discussed with Consultants/Other Providers:

## 2021-03-12 NOTE — PROGRESS NOTE ADULT - SUBJECTIVE AND OBJECTIVE BOX
Marcella Fraser MD  Internal Medicine Resident  [phone]    PATIENT:  SELENE BE  [id number]    CHIEF COMPLAINT:  Patient is a 65y old  Male who presents with a chief complaint of chest pain (11 Mar 2021 19:42)      INTERVAL HISTORY/OVERNIGHT EVENTS:      REVIEW OF SYSTEMS:    Constitutional:     [ ] negative [ ] fevers [ ] chills [ ] weight loss [ ] weight gain  HEENT:                  [ ] negative [ ] dry eyes [ ] eye irritation [ ] postnasal drip [ ] nasal congestion  CV:                         [ ] negative  [ ] chest pain [ ] orthopnea [ ] palpitations [ ] murmur  Resp:                     [ ] negative [ ] cough [ ] shortness of breath [ ] dyspnea [ ] wheezing [ ] sputum [ ] hemoptysis  GI:                          [ ] negative [ ] nausea [ ] vomiting [ ] diarrhea [ ] constipation [ ] abd pain [ ] dysphagia   :                        [ ] negative [ ] dysuria [ ] nocturia [ ] hematuria [ ] increased urinary frequency  Musculoskeletal: [ ] negative [ ] back pain [ ] myalgias [ ] arthralgias [ ] fracture  Skin:                       [ ] negative [ ] rash [ ] itch  Neurological:        [ ] negative [ ] headache [ ] dizziness [ ] syncope [ ] weakness [ ] numbness  Psychiatric:           [ ] negative [ ] anxiety [ ] depression  Endocrine:            [ ] negative [ ] diabetes [ ] thyroid problem  Heme/Lymph:      [ ] negative [ ] anemia [ ] bleeding problem  Allergic/Immune: [ ] negative [ ] itchy eyes [ ] nasal discharge [ ] hives [ ] angioedema    [ ] All other systems negative  [ ] Unable to assess ROS because ________.    MEDICATIONS:  MEDICATIONS  (STANDING):  aspirin  chewable 81 milliGRAM(s) Oral daily  atorvastatin 80 milliGRAM(s) Oral at bedtime  cefuroxime  IVPB 1500 milliGRAM(s) IV Intermittent once  chlorhexidine 2% Cloths 1 Application(s) Topical <User Schedule>  dextrose 40% Gel 15 Gram(s) Oral once  dextrose 5%. 1000 milliLiter(s) (50 mL/Hr) IV Continuous <Continuous>  dextrose 5%. 1000 milliLiter(s) (100 mL/Hr) IV Continuous <Continuous>  dextrose 50% Injectable 50 milliLiter(s) IV Push every 15 minutes  glucagon  Injectable 1 milliGRAM(s) IntraMuscular once  heparin  Infusion. 1000 Unit(s)/Hr (10 mL/Hr) IV Continuous <Continuous>  hydrALAZINE 25 milliGRAM(s) Oral three times a day  insulin glargine Injectable (LANTUS) 40 Unit(s) SubCutaneous at bedtime  insulin lispro (ADMELOG) corrective regimen sliding scale   SubCutaneous three times a day before meals  insulin lispro (ADMELOG) corrective regimen sliding scale   SubCutaneous at bedtime  insulin lispro Injectable (ADMELOG) 10 Unit(s) SubCutaneous three times a day before meals  latanoprost 0.005% Ophthalmic Solution 1 Drop(s) Both EYES at bedtime  levothyroxine 100 MICROGram(s) Oral daily  metoprolol tartrate 50 milliGRAM(s) Oral three times a day  mupirocin 2% Ointment 1 Application(s) Topical two times a day  potassium chloride    Tablet ER 10 milliEquivalent(s) Oral daily    MEDICATIONS  (PRN):  acetaminophen 300 mG/codeine 30 mG 1 Tablet(s) Oral every 4 hours PRN Moderate Pain (4 - 6)  BACItracin   Ointment 1 Application(s) Topical every 12 hours PRN Pain/itching      ALLERGIES:  Allergies    morphine (Urticaria)    Intolerances        OBJECTIVE:  ICU Vital Signs Last 24 Hrs  T(C): 36.4 (12 Mar 2021 06:00), Max: 36.8 (11 Mar 2021 17:00)  T(F): 97.5 (12 Mar 2021 06:00), Max: 98.3 (11 Mar 2021 17:00)  HR: 60 (12 Mar 2021 06:00) (52 - 78)  BP: --  BP(mean): --  ABP: --  ABP(mean): --  RR: 22 (12 Mar 2021 06:00) (13 - 42)  SpO2: 97% (12 Mar 2021 06:00) (84% - 97%)      Adult Advanced Hemodynamics Last 24 Hrs  CVP(mm Hg): --  CVP(cm H2O): --  CO: --  CI: --  PA: --  PA(mean): --  PCWP: --  SVR: --  SVRI: --  PVR: --  PVRI: --  CAPILLARY BLOOD GLUCOSE      POCT Blood Glucose.: 285 mg/dL (12 Mar 2021 06:41)  POCT Blood Glucose.: 161 mg/dL (11 Mar 2021 21:20)  POCT Blood Glucose.: 127 mg/dL (11 Mar 2021 17:38)  POCT Blood Glucose.: 120 mg/dL (11 Mar 2021 14:21)  POCT Blood Glucose.: 159 mg/dL (11 Mar 2021 08:41)    CAPILLARY BLOOD GLUCOSE      POCT Blood Glucose.: 285 mg/dL (12 Mar 2021 06:41)    I&O's Summary    10 Mar 2021 07:01  -  11 Mar 2021 07:00  --------------------------------------------------------  IN: 1266 mL / OUT: 1080 mL / NET: 186 mL    11 Mar 2021 07:01  -  12 Mar 2021 06:58  --------------------------------------------------------  IN: 744 mL / OUT: 2875 mL / NET: -2131 mL      Daily     Daily Weight in k.1 (12 Mar 2021 06:00)    PHYSICAL EXAMINATION:  General: WN/WD NAD  HEENT: PERRLA, EOMI, moist mucous membranes  Neurology: A&Ox3, nonfocal, QUIROZ x 4  Respiratory: CTA B/L, normal respiratory effort, no wheezes, crackles, rales  CV: RRR, S1S2, no murmurs, rubs or gallops  Abdominal: Soft, NT, ND +BS, Last BM  Extremities: No edema, + peripheral pulses  Incisions:   Tubes:    LABS:                          13.7   11.12 )-----------( 190      ( 12 Mar 2021 04:25 )             43.2     03-12    135  |  105  |  17  ----------------------------<  248<H>  4.3   |  20<L>  |  0.96    Ca    8.7      12 Mar 2021 04:25  Phos  2.8     03-12  Mg     2.1     03-12    TPro  6.3  /  Alb  3.3  /  TBili  0.5  /  DBili  x   /  AST  22  /  ALT  30  /  AlkPhos  106  03-12    LIVER FUNCTIONS - ( 12 Mar 2021 04:25 )  Alb: 3.3 g/dL / Pro: 6.3 g/dL / ALK PHOS: 106 U/L / ALT: 30 U/L / AST: 22 U/L / GGT: x           PT/INR - ( 12 Mar 2021 04:25 )   PT: 12.0 sec;   INR: 1.00 ratio         PTT - ( 12 Mar 2021 04:25 )  PTT:69.4 sec            TELEMETRY:     EKG:     IMAGING:       Marcella Fraser MD  Internal Medicine Resident  [phone]    PATIENT:  SELENE BE  [id number]    CHIEF COMPLAINT:  Patient is a 65y old  Male who presents with a chief complaint of chest pain (11 Mar 2021 19:42)      INTERVAL HISTORY/OVERNIGHT EVENTS: Cleared by gen surgery for CABG scheduled on 3/12.       REVIEW OF SYSTEMS: Unable to assess ROS because pt in OR.     MEDICATIONS:  MEDICATIONS  (STANDING):  aspirin  chewable 81 milliGRAM(s) Oral daily  atorvastatin 80 milliGRAM(s) Oral at bedtime  cefuroxime  IVPB 1500 milliGRAM(s) IV Intermittent once  chlorhexidine 2% Cloths 1 Application(s) Topical <User Schedule>  dextrose 40% Gel 15 Gram(s) Oral once  dextrose 5%. 1000 milliLiter(s) (50 mL/Hr) IV Continuous <Continuous>  dextrose 5%. 1000 milliLiter(s) (100 mL/Hr) IV Continuous <Continuous>  dextrose 50% Injectable 50 milliLiter(s) IV Push every 15 minutes  glucagon  Injectable 1 milliGRAM(s) IntraMuscular once  heparin  Infusion. 1000 Unit(s)/Hr (10 mL/Hr) IV Continuous <Continuous>  hydrALAZINE 25 milliGRAM(s) Oral three times a day  insulin glargine Injectable (LANTUS) 40 Unit(s) SubCutaneous at bedtime  insulin lispro (ADMELOG) corrective regimen sliding scale   SubCutaneous three times a day before meals  insulin lispro (ADMELOG) corrective regimen sliding scale   SubCutaneous at bedtime  insulin lispro Injectable (ADMELOG) 10 Unit(s) SubCutaneous three times a day before meals  latanoprost 0.005% Ophthalmic Solution 1 Drop(s) Both EYES at bedtime  levothyroxine 100 MICROGram(s) Oral daily  metoprolol tartrate 50 milliGRAM(s) Oral three times a day  mupirocin 2% Ointment 1 Application(s) Topical two times a day  potassium chloride    Tablet ER 10 milliEquivalent(s) Oral daily    MEDICATIONS  (PRN):  acetaminophen 300 mG/codeine 30 mG 1 Tablet(s) Oral every 4 hours PRN Moderate Pain (4 - 6)  BACItracin   Ointment 1 Application(s) Topical every 12 hours PRN Pain/itching      ALLERGIES:  Allergies    morphine (Urticaria)    Intolerances        OBJECTIVE:  ICU Vital Signs Last 24 Hrs  T(C): 36.4 (12 Mar 2021 06:00), Max: 36.8 (11 Mar 2021 17:00)  T(F): 97.5 (12 Mar 2021 06:00), Max: 98.3 (11 Mar 2021 17:00)  HR: 60 (12 Mar 2021 06:00) (52 - 78)  BP: --  BP(mean): --  ABP: --  ABP(mean): --  RR: 22 (12 Mar 2021 06:00) (13 - 42)  SpO2: 97% (12 Mar 2021 06:00) (84% - 97%)      Adult Advanced Hemodynamics Last 24 Hrs  CVP(mm Hg): --  CVP(cm H2O): --  CO: --  CI: --  PA: --  PA(mean): --  PCWP: --  SVR: --  SVRI: --  PVR: --  PVRI: --  CAPILLARY BLOOD GLUCOSE      POCT Blood Glucose.: 285 mg/dL (12 Mar 2021 06:41)  POCT Blood Glucose.: 161 mg/dL (11 Mar 2021 21:20)  POCT Blood Glucose.: 127 mg/dL (11 Mar 2021 17:38)  POCT Blood Glucose.: 120 mg/dL (11 Mar 2021 14:21)  POCT Blood Glucose.: 159 mg/dL (11 Mar 2021 08:41)    CAPILLARY BLOOD GLUCOSE      POCT Blood Glucose.: 285 mg/dL (12 Mar 2021 06:41)    I&O's Summary    10 Mar 2021 07:01  -  11 Mar 2021 07:00  --------------------------------------------------------  IN: 1266 mL / OUT: 1080 mL / NET: 186 mL    11 Mar 2021 07:01  -  12 Mar 2021 06:58  --------------------------------------------------------  IN: 744 mL / OUT: 2875 mL / NET: -2131 mL      Daily     Daily Weight in k.1 (12 Mar 2021 06:00)    PHYSICAL EXAMINATION:  General: WN/WD NAD  HEENT: PERRLA, EOMI, moist mucous membranes  Neurology: A&Ox3, nonfocal, QUIROZ x 4  Respiratory: CTA B/L, normal respiratory effort, no wheezes, crackles, rales  CV: RRR, S1S2, no murmurs, rubs or gallops  Abdominal: Soft, NT, ND +BS, Last BM  Extremities: No edema, + peripheral pulses  Incisions:   Tubes:    LABS:                          13.7   11.12 )-----------( 190      ( 12 Mar 2021 04:25 )             43.2     03-12    135  |  105  |  17  ----------------------------<  248<H>  4.3   |  20<L>  |  0.96    Ca    8.7      12 Mar 2021 04:25  Phos  2.8     03-12  Mg     2.1     03-12    TPro  6.3  /  Alb  3.3  /  TBili  0.5  /  DBili  x   /  AST  22  /  ALT  30  /  AlkPhos  106  03-12    LIVER FUNCTIONS - ( 12 Mar 2021 04:25 )  Alb: 3.3 g/dL / Pro: 6.3 g/dL / ALK PHOS: 106 U/L / ALT: 30 U/L / AST: 22 U/L / GGT: x           PT/INR - ( 12 Mar 2021 04:25 )   PT: 12.0 sec;   INR: 1.00 ratio         PTT - ( 12 Mar 2021 04:25 )  PTT:69.4 sec

## 2021-03-12 NOTE — PROGRESS NOTE ADULT - SUBJECTIVE AND OBJECTIVE BOX
Cardiovascular Disease Progress Note    Overnight events: No acute events overnight.  awaiting cabg. no new cardiac sx  Otherwise review of systems negative    Objective Findings:  T(C): 36.4 (21 @ 06:00), Max: 36.8 (21 @ 17:00)  HR: 60 (21 @ 07:00) (52 - 78)  BP: --  RR: 18 (21 @ 07:00) (13 - 42)  SpO2: 94% (21 @ 07:00) (84% - 97%)  Wt(kg): --  Daily     Daily Weight in k.1 (12 Mar 2021 06:00)      Physical Exam:  Gen: NAD  HEENT: EOMI  CV: RRR, normal S1 + S2, no m/r/g  Lungs: CTAB  Abd: soft, non-tender  Ext: No edema. groin c/d/i    Telemetry: nsr    Laboratory Data:                        13.7   11.12 )-----------( 190      ( 12 Mar 2021 04:25 )             43.2     03-12    135  |  105  |  17  ----------------------------<  248<H>  4.3   |  20<L>  |  0.96    Ca    8.7      12 Mar 2021 04:25  Phos  2.8     12  Mg     2.1     12    TPro  6.3  /  Alb  3.3  /  TBili  0.5  /  DBili  x   /  AST  22  /  ALT  30  /  AlkPhos  106  03-12    PT/INR - ( 12 Mar 2021 04:25 )   PT: 12.0 sec;   INR: 1.00 ratio         PTT - ( 12 Mar 2021 04:25 )  PTT:69.4 sec          Inpatient Medications:  MEDICATIONS  (STANDING):  aspirin  chewable 81 milliGRAM(s) Oral daily  atorvastatin 80 milliGRAM(s) Oral at bedtime  cefuroxime  IVPB 1500 milliGRAM(s) IV Intermittent once  chlorhexidine 2% Cloths 1 Application(s) Topical <User Schedule>  dextrose 40% Gel 15 Gram(s) Oral once  dextrose 5%. 1000 milliLiter(s) (50 mL/Hr) IV Continuous <Continuous>  dextrose 5%. 1000 milliLiter(s) (100 mL/Hr) IV Continuous <Continuous>  dextrose 50% Injectable 50 milliLiter(s) IV Push every 15 minutes  glucagon  Injectable 1 milliGRAM(s) IntraMuscular once  heparin  Infusion. 1000 Unit(s)/Hr (10 mL/Hr) IV Continuous <Continuous>  hydrALAZINE 25 milliGRAM(s) Oral three times a day  insulin glargine Injectable (LANTUS) 40 Unit(s) SubCutaneous at bedtime  insulin lispro (ADMELOG) corrective regimen sliding scale   SubCutaneous three times a day before meals  insulin lispro (ADMELOG) corrective regimen sliding scale   SubCutaneous at bedtime  insulin lispro Injectable (ADMELOG) 10 Unit(s) SubCutaneous three times a day before meals  latanoprost 0.005% Ophthalmic Solution 1 Drop(s) Both EYES at bedtime  levothyroxine 100 MICROGram(s) Oral daily  metoprolol tartrate 50 milliGRAM(s) Oral three times a day  mupirocin 2% Ointment 1 Application(s) Topical two times a day  potassium chloride    Tablet ER 10 milliEquivalent(s) Oral daily      Assessment:  -unstable angina  -mvd  -htn  -hld  -dm  -chronic pancreatitis    Recs:  s/p c with mvd, awating cabg with dr martinez for today  iabp 1:1 for coronary perfusion, hydralazine and nitro gtt as hemos allow given ongoing chest pains  cont to hold brilinta, p2y12 assay 190  cw asa, statin beta blockers and hep gtt  tte inf hk, overall preserved lv fxn  fu cts   care per ccu        Over 25 minutes spent on total encounter; more than 50% of the visit was spent counseling and/or coordinating care by the attending physician.      Alexei Fraga MD   Cardiovascular Disease  [phone]

## 2021-03-12 NOTE — AIRWAY REMOVAL NOTE  ADULT & PEDS - ARTIFICAL AIRWAY REMOVAL COMMENTS
Written order for extubation verified. The patient was identified by full name and birth date compared to the identification band. Present during the procedure was SULEMAN MERIDA

## 2021-03-12 NOTE — PROGRESS NOTE ADULT - ASSESSMENT
65M with PMH of HTN, T2DM, HLD, hypothyroidism, chronic pancreatitis p/w chest pain x 2 weeks, found with inferolateral wall hypokinesis with inferior TWI on EKG as outpt, admitted for c/f ACS.     CAD/  Unstable angina.   - s/p CABGX3  - IABP    Essential hypertension.   - control    Type 2 diabetes mellitus with hyperglycemia, with long-term current use of insulin.  - lantus 40units and 10units with meals   - titrate based on FS   - low ISS and monitor FS ac and hs.    Hypothyroidism.    - continue Synthroid  - follow TSH.    Chronic pancreatitis.   - chronic abd pain, lipase mildly elevated  - c/t monitor  - c/w pain control.    SBO partial  - surgical eval. No intervention.     HLD (hyperlipidemia).   - high intensity statin.    CTU care     Sriram Flores MD pager [phone]

## 2021-03-12 NOTE — PRE-ANESTHESIA EVALUATION ADULT - NSRADCARDRESULTSFT_GEN_ALL_CORE
< from: TTE with Doppler (w/Cont) (03.10.21 @ 06:23) >    Conclusions:  1. Normal mitral valve. No mitral regurgitation seen.  2. Normal trileaflet aortic valve. No aortic valve  regurgitation seen.  3. Overall preserved left ventricular systolic function.  Endocardial visualization enhanced with intravenous  injection of Ultrasonic Enhancing Agent (Definity). No left  ventricular thrombus. The basal  inferolateral wall is  hypokinetic.  4. Mild diastolic dysfunction (Stage I).  5. Normalright ventricular size and function.  6. Normal pericardium with no pericardial effusion.    < end of copied text >    < from: Cardiac Cath Lab - Adult (03.09.21 @ 11:22) >    VENTRICLES: Analysis of regional contractile function demonstrated mild  diaphragmatic hypokinesis and severe posterobasal hypokinesis. EF  estimated was 45 %.  VALVES: MITRAL VALVE: The mitral valve exhibited no regurgitation.  CORONARY VESSELS: The coronary circulation is right dominant.  LM:   --  LM: Angiography showed minor luminal irregularities with no flow  limiting lesions.  LAD:   --  Ostial LAD: There was a 90 % stenosis. e ccentric  --  Mid LAD: There was a 60 % stenosis.  CX:   --  OM1: There was a 70 % stenosis.  --  OM2: There was a 90 % stenosis in the proximal third of the vessel  segment.  RCA: --  Mid RCA: There was a tubular 95 % stenosis. The lesion was  eccentric and moderately calcified. Tortuous    < end of copied text >

## 2021-03-12 NOTE — PRE-ANESTHESIA EVALUATION ADULT - NSANTHPMHFT_GEN_ALL_CORE
65M with PMH of HTN, T2DM, HLD, hypothyroidism, chronic pancreatitis p/w chest pain x 2 weeks,  inferolateral wall hypokinesis with inferior TWI on EKG, cath with multivessel disease. course c/b persistent CP now on iABP 1:1

## 2021-03-12 NOTE — PROGRESS NOTE ADULT - ASSESSMENT
Assessment: 65M with PMH of HTN, T2DM, HLD, hypothyroidism, chronic pancreatitis p/w NSTEMI s/p LHC revealing TVD pending CABG c/b persistent chest pain requiring IABP.    Plan:  ====================== NEUROLOGY=====================  A&O x3  - Nonfocal, continue to monitor neuro status per ICU protocol.   - Tylenol PRN for analgesia     acetaminophen 300 mG/codeine 30 mG 1 Tablet(s) Oral every 4 hours PRN Moderate Pain (4 - 6)  ==================== RESPIRATORY======================  Comfortable on room air, SpO2 96%  - Continue to monitor SpO2 via pulse oximetry  - Encourage bedside spirometry     ====================CARDIOVASCULAR==================  Multivessel CAD  - S/P ASA and Brilinta load, started on heparin gtt- Brilinta being held for CTS w/u for CABG  - S/P LHC on 3/9: oLAD 90%, mLAD 60%, OM1 70%, OM2 90%, mRCA 95%. No PCI + IABP  - Pt with active CP and HTN augmented diastolic pressures 110s-120s  - C/w NTG gtt for CP resolution and maintain ADp > 90   - C/w Lipitor 80mg QHS w/ ASA  - c/w Lopressor 50 tid  - start hydral 10 tid for afterload reduction   - c/w Hep gtt on IABP   - 3/10 TTE: EF 55-60%. Basal inferolat wall hypokinesis. No effusion, no lv thrombus.   -Pending CABG 3/12, NPO after midnight     hydrALAZINE 25 milliGRAM(s) Oral three times a day  metoprolol tartrate 50 milliGRAM(s) Oral three times a day  aspirin  chewable 81 milliGRAM(s) Oral daily  atorvastatin 80 milliGRAM(s) Oral at bedtime  ===================HEMATOLOGIC/ONC ===================  H/H 14.8/46.5, stable.   Continue to monitor hemoglobin and hematocrit levels.     VTE prophylaxis   - Continue Heparin for venous thromboembolism prophylaxis.     heparin  Infusion. 1000 Unit(s)/Hr (10 mL/Hr) IV Continuous <Continuous>  ===================== RENAL =========================  No active issues   - Continue to monitor I/Os, BUN/Creatinine, and urine output.   - Goal net negative fluid balance. Replete lytes PRN. Keep K> 4 and Mg >2.     ==================== GASTROINTESTINAL===================  Tolerating PO DASH/TLC diet.     Chronic Pancreatitis   - Patient states that he has had chronic pancreatitis for several years and has been hospitalized multiple times for pain relief. He currently has abd pain which he states feels the same as when he has a flair of his chronic pancreatitis. He states he has a cholecystectomy and "stone removal" from pancreas back in 1992  - Lipase wnl- 24  - WBC elevated at 14  - Gen surg following- no surgical intervention at this time, cleared for CABG tomorrow   - CT Abd/Pel w/ IV contrast 3/11: Suspected left paraduodenal internal hernia. Focally dilated segment of proximal jejunum appears related to prior bowel resection. Partial small bowel obstruction of the mid ileum distal to and separate from the internal hernia. No evidence of bowel ischemia. Appearance of the pancreas consistent with history of chronic pancreatitis without evidence of acute exacerbation. Distal tip of the intra-aortic balloon pump overlaps the origins of the celiac and superior mesenteric arteries. Small right common femoral artery pseudoaneurysm.  - consider bowel rest if continues to be symptomatic     dextrose 5%. 1000 milliLiter(s) (50 mL/Hr) IV Continuous <Continuous>  dextrose 5%. 1000 milliLiter(s) (100 mL/Hr) IV Continuous <Continuous>  potassium chloride    Tablet ER 10 milliEquivalent(s) Oral daily  =======================    ENDOCRINE  =====================  Hx of DMT2  - Glycemic control with Lantus 40U, Admelog Premeals 10U tid, and Glucagon PRN.  - Monitor blood glucose levels.     Hypothyroidism  - C/w Synthroid  - Monitor TFTs    dextrose 40% Gel 15 Gram(s) Oral once  dextrose 50% Injectable 50 milliLiter(s) IV Push every 15 minutes  glucagon  Injectable 1 milliGRAM(s) IntraMuscular once  insulin glargine Injectable (LANTUS) 40 Unit(s) SubCutaneous at bedtime  insulin lispro (ADMELOG) corrective regimen sliding scale   SubCutaneous three times a day before meals  insulin lispro (ADMELOG) corrective regimen sliding scale   SubCutaneous at bedtime  insulin lispro Injectable (ADMELOG) 10 Unit(s) SubCutaneous three times a day before meals  levothyroxine 100 MICROGram(s) Oral daily  ========================INFECTIOUS DISEASE================  Leukocytosis with WBC 14.39, afebrile.   - Monitor temperature and trend WBC.  - Continue Cefuroxime for perioperative antibiotic coverage.    cefuroxime  IVPB 1500 milliGRAM(s) IV Intermittent once Assessment: 65M with PMH of HTN, T2DM, HLD, hypothyroidism, chronic pancreatitis p/w NSTEMI s/p LHC revealing TVD pending CABG c/b persistent chest pain requiring IABP and CT finding of internal hernia along with partial SBO although clinically stable without obstruction. He is to undergo CABG planned for 3/12 and will be transferred to CT sx service afterwards.     Plan:  ====================== NEUROLOGY=====================  A&O x3  - Nonfocal, continue to monitor neuro status per ICU protocol.   - Tylenol PRN for analgesia     acetaminophen 300 mG/codeine 30 mG 1 Tablet(s) Oral every 4 hours PRN Moderate Pain (4 - 6)  ==================== RESPIRATORY======================  Comfortable on room air, SpO2 96%  - Continue to monitor SpO2 via pulse oximetry  - Encourage bedside spirometry     ====================CARDIOVASCULAR==================  Multivessel CAD  - S/P ASA and Brilinta load, started on heparin gtt- Brilinta being held for CTS w/u for CABG  - S/P C on 3/9: oLAD 90%, mLAD 60%, OM1 70%, OM2 90%, mRCA 95%. No PCI + IABP  - Pt with active CP and HTN augmented diastolic pressures 110s-120s  - C/w NTG gtt for CP resolution and maintain ADp > 90   - C/w Lipitor 80mg QHS w/ ASA  - c/w Lopressor 50 tid  - start hydral 10 tid for afterload reduction   - c/w Hep gtt on IABP   - 3/10 TTE: EF 55-60%. Basal inferolat wall hypokinesis. No effusion, no lv thrombus.   -Pending CABG 3/12, NPO after midnight     hydrALAZINE 25 milliGRAM(s) Oral three times a day  metoprolol tartrate 50 milliGRAM(s) Oral three times a day  aspirin  chewable 81 milliGRAM(s) Oral daily  atorvastatin 80 milliGRAM(s) Oral at bedtime  ===================HEMATOLOGIC/ONC ===================  H/H 14.8/46.5, stable.   Continue to monitor hemoglobin and hematocrit levels.     VTE prophylaxis   - Continue Heparin for venous thromboembolism prophylaxis.     heparin  Infusion. 1000 Unit(s)/Hr (10 mL/Hr) IV Continuous <Continuous>  ===================== RENAL =========================  No active issues   - Continue to monitor I/Os, BUN/Creatinine, and urine output.   - Goal net negative fluid balance. Replete lytes PRN. Keep K> 4 and Mg >2.     ==================== GASTROINTESTINAL===================  Tolerating PO DASH/TLC diet.     Chronic Pancreatitis   - Patient states that he has had chronic pancreatitis for several years and has been hospitalized multiple times for pain relief. He currently has abd pain which he states feels the same as when he has a flair of his chronic pancreatitis. He states he has a cholecystectomy and "stone removal" from pancreas back in 1992  - Lipase wnl- 24  - WBC elevated at 14  - Gen surg following- no surgical intervention at this time, cleared for CABG   - CT Abd/Pel w/ IV contrast 3/11: Suspected left paraduodenal internal hernia. Focally dilated segment of proximal jejunum appears related to prior bowel resection. Partial small bowel obstruction of the mid ileum distal to and separate from the internal hernia. No evidence of bowel ischemia. Appearance of the pancreas consistent with history of chronic pancreatitis without evidence of acute exacerbation. Distal tip of the intra-aortic balloon pump overlaps the origins of the celiac and superior mesenteric arteries. Small right common femoral artery pseudoaneurysm.  - consider bowel rest if continues to be symptomatic     dextrose 5%. 1000 milliLiter(s) (50 mL/Hr) IV Continuous <Continuous>  dextrose 5%. 1000 milliLiter(s) (100 mL/Hr) IV Continuous <Continuous>  potassium chloride    Tablet ER 10 milliEquivalent(s) Oral daily  =======================    ENDOCRINE  =====================  Hx of DMT2  - Glycemic control with Lantus 40U, Admelog Premeals 10U tid, and Glucagon PRN.  - Monitor blood glucose levels.     Hypothyroidism  - C/w Synthroid  - Monitor TFTs    dextrose 40% Gel 15 Gram(s) Oral once  dextrose 50% Injectable 50 milliLiter(s) IV Push every 15 minutes  glucagon  Injectable 1 milliGRAM(s) IntraMuscular once  insulin glargine Injectable (LANTUS) 40 Unit(s) SubCutaneous at bedtime  insulin lispro (ADMELOG) corrective regimen sliding scale   SubCutaneous three times a day before meals  insulin lispro (ADMELOG) corrective regimen sliding scale   SubCutaneous at bedtime  insulin lispro Injectable (ADMELOG) 10 Unit(s) SubCutaneous three times a day before meals  levothyroxine 100 MICROGram(s) Oral daily  ========================INFECTIOUS DISEASE================  Leukocytosis with WBC 14.39, afebrile.   - Monitor temperature and trend WBC.  - Continue Cefuroxime for perioperative antibiotic coverage.    cefuroxime  IVPB 1500 milliGRAM(s) IV Intermittent once

## 2021-03-12 NOTE — PROGRESS NOTE ADULT - SUBJECTIVE AND OBJECTIVE BOX
Patient seen and examined at the bedside.      Remained critically ill on continuous ICU monitoring.    OBJECTIVE:  ICU Vital Signs Last 24 Hrs  T(C): 36.3 (12 Mar 2021 17:00), Max: 36.7 (11 Mar 2021 20:00)  T(F): 97.4 (12 Mar 2021 17:00), Max: 98 (11 Mar 2021 20:00)  HR: 74 (12 Mar 2021 17:) (52 - 79)  BP: --  BP(mean): --  ABP: 123/53 (12 Mar 2021 17:00) (97/48 - 129/49)  ABP(mean): 87 (12 Mar 2021 17:) (71 - 87)  RR: 15 (12 Mar 2021 17:) (12 - 26)  SpO2: 100% (12 Mar 2021 17:) (92% - 100%)    Mode: AC/ CMV (Assist Control/ Continuous Mandatory Ventilation), RR (machine): 14, TV (machine): 550, FiO2: 60, PEEP: 5, ITime: 1, MAP: 10, PIP: 24    03-11 @ 07:12 @ 07:00  --------------------------------------------------------  IN: 756 mL / OUT: 2875 mL / NET: -2119 mL     @ 07: @ 17:44  --------------------------------------------------------  IN: 499.4 mL / OUT: 425 mL / NET: 74.4 mL      CAPILLARY BLOOD GLUCOSE      POCT Blood Glucose.: 177 mg/dL (12 Mar 2021 15:58)        Review of Systems:  Unable to obatin, pt on sedatives.        Daily Height in cm: 175.26 (12 Mar 2021 07:00)    Daily Weight in k.1 (12 Mar 2021 06:00)    PHYSICAL EXAM:  General: Sedated  Neurology: Sedated   Eyes: PERRLA  ENT/Neck: Neck supple, trachea midline, No JVD, Gross hearing intact  Respiratory: + ETT midline No wheezing, rhonchi. Rales noted bilaterally.  CV: RRR, S1S2, no murmurs, rubs or gallops  Abdominal: Soft, NT, ND +BS,   Extremities: 1-2+ pedal edema noted, + peripheral pulses  Skin: No Rashes, Hematoma, Ecchymosis      LINES:  [x ] Arterial Line   [x ] Central Line  [ ] PA catheter  [x ] IABP  [ ] ECMO  [ ] LVAD  [x ] Ventilator  [ ] pacemaker [ ] Impella    Beside evaluation, monitoring, treatment of hemodynamics, fluids, IVP/IVCD meds,         Ventilator ( x )  Mode: AC/ CMV (Assist Control/ Continuous Mandatory Ventilation)  RR (machine): 14  TV (machine): 550  FiO2: 60  PEEP: 5  ITime: 1  MAP: 10  PIP: 24              Hemodynamic lability, instability. Requires IVCD [ ] vasopressors [ ] inotropes  [ ] vasodilator  [x ]IVSS fluid  to maintain MAP, perfusion, C.I.     Ventilator Management:  [x ]AC-rest    [ ]CPAP-PS Wean    [ ]Trach Collar     [ ]Extubate    [ ] T-Piece  [ ]peep>5     ALL MEDICATIONS:  MEDICATIONS  (STANDING):  aspirin enteric coated 81 milliGRAM(s) Oral daily  aspirin Suppository 300 milliGRAM(s) Rectal once  atorvastatin 40 milliGRAM(s) Oral at bedtime  cefuroxime  IVPB 1500 milliGRAM(s) IV Intermittent every 8 hours  chlorhexidine 0.12% Liquid 15 milliLiter(s) Oral Mucosa every 12 hours  chlorhexidine 2% Cloths 1 Application(s) Topical <User Schedule>  dexMEDEtomidine Infusion 0.3 MICROgram(s)/kG/Hr (6.64 mL/Hr) IV Continuous <Continuous>  dextrose 50% Injectable 50 milliLiter(s) IV Push every 15 minutes  dextrose 50% Injectable 25 milliLiter(s) IV Push every 15 minutes  famotidine Injectable 20 milliGRAM(s) IV Push every 12 hours  insulin regular Infusion 3 Unit(s)/Hr (3 mL/Hr) IV Continuous <Continuous>  latanoprost 0.005% Ophthalmic Solution 1 Drop(s) Both EYES at bedtime  levothyroxine 100 MICROGram(s) Oral daily  mupirocin 2% Ointment 1 Application(s) Topical two times a day  niCARdipine Infusion 5 mG/Hr (25 mL/Hr) IV Continuous <Continuous>  polyethylene glycol 3350 17 Gram(s) Oral daily  potassium chloride  10 mEq/50 mL IVPB 10 milliEquivalent(s) IV Intermittent every 1 hour  potassium chloride  10 mEq/50 mL IVPB 10 milliEquivalent(s) IV Intermittent every 1 hour  potassium chloride  10 mEq/50 mL IVPB 10 milliEquivalent(s) IV Intermittent every 1 hour  sodium chloride 0.9%. 1000 milliLiter(s) (10 mL/Hr) IV Continuous <Continuous>    MEDICATIONS  (PRN):  BACItracin   Ointment 1 Application(s) Topical every 12 hours PRN Pain/itching      LABS:                        12.9   14.37 )-----------( 114      ( 12 Mar 2021 14:09 )             39.1         137  |  106  |  15  ----------------------------<  211<H>  4.2   |  23  |  0.87    Ca    9.2      12 Mar 2021 14:08  Phos  2.8       Mg     2.1         TPro  5.3<L>  /  Alb  3.2<L>  /  TBili  1.2  /  DBili  x   /  AST  51<H>  /  ALT  23  /  AlkPhos  79  03-12    PT/INR - ( 12 Mar 2021 14:08 )   PT: 13.3 sec;   INR: 1.11 ratio         PTT - ( 12 Mar 2021 14:08 )  PTT:30.8 sec    Arterial Blood Gas:   @ 16:54  7.40/39/81/24/96/-.6  ABG lactate: --  Arterial Blood Gas:   @ 15:08  7.34/43/137/23/99/-2.1  ABG lactate: --  Arterial Blood Gas:   @ 14:10  7.34/47/133/25/98/-.7  ABG lactate: --  Arterial Blood Gas:   @ 12:09  7.41/40/188/24/99/.4  ABG lactate: --  Arterial Blood Gas:   @ 11:04  7.39/44/485/26/100/1.4  ABG lactate: --  Arterial Blood Gas:   @ 10:32  7.37/48/582/27/100/1.8  ABG lactate: --  Arterial Blood Gas:   @ 10:04  7.33/40/566//100/-4.5  ABG lactate: --  Arterial Blood Gas:   @ 09:21  7.30/52/71//92/-1.8  ABG lactate: --  Arterial Blood Gas:   @ 08:20  7.43/32/377//100/-2.0  ABG lactate: --    Venous Blood Gas:   @ 11:05  7.36/48/61//91  VBG Lactate: 1.1  Venous Blood Gas:   @ 10:33  7.34/50/69//94  VBG Lactate: 0.9  Venous Blood Gas:   @ 10:04  7.30/47/68//  VBG Lactate: 0.7  Venous Blood Gas:   @ 17:44  --/--/--/--/--  VBG Lactate: 0.9    CARDIAC MARKERS ( 12 Mar 2021 14:08 )  x     / x     / 576 U/L / x     / 62.2 ng/mL    LIVER FUNCTIONS - ( 12 Mar 2021 14:08 )  Alb: 3.2 g/dL / Pro: 5.3 g/dL / ALK PHOS: 79 U/L / ALT: 23 U/L / AST: 51 U/L / GGT: x               MICROBIOLOGY:   N/A    RADIOLOGY:    Patient name: SELENE BE  YOB: 1955   Age: 65 (M)   MR#: [id number]  Study Date: 3/12/2021  [facility]  Study quality: Technically good  Referring Physician: [employer],  M.D  Blood Pressure: 156/59 mmHg  Height: 175 cm  Weight: 88 kg  BSA: 2 m2  Heart Rate: 60 mmHg  ------------------------------------------------------------------------  PROCEDURE: Intra operative transeophageal echocardiogram  with 2D, M modeand complete  Doppler examination.  The  transesophageal probe was placed in the esophagus after  induction of anesthesia. Real-time and reconstructed  3-dimensional imaging was performed.  Color Doppler  analysis was carried out.  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 No mitral regurgitation  seen.  Aortic Valve/Aorta: Normal trileaflet aortic valve. No  aortic valve regurgitation seen.  Ascending Aorta: 3.2 cm.  Left Atrium: Normal left atrium.  Left Ventricle: Normal left ventricular systolic function.  The mid inferior wall is hypokinetic. The basal inferior  wall is akinetic.  Normal left ventricular internal  dimensions and wall thicknesses.  Right Heart: Normal right atrium. Normal right ventricular  size and function. Normal tricuspid valve. No tricuspid  regurgitation. Normal pulmonic valve. No pulmonic  regurgitation.  Pericardium/Pleura: Normal pericardium with no pericardial  effusion.  Hemodynamic: Color Doppler demonstrates no evidence of a  patent foramen ovale.  ------------------------------------------------------------------------  Post-Bypass Observations:    s/p CABG  LIMA-LAD,SVG-OM, SVG-PDA  Improved LV RWMA.  Inferior wall hypokinetic.  LVEF 60-65%  on minimal norepi support.  Normal RV function.  No valvular abnormalities.  All other exams unchanged from pre-CPB.  ------------------------------------------------------------------------  Conclusions:  Confirmed on  3/12/2021 - 12:18:36 by Thomas Cornelius M.D.  ------------------------------------------------------------------------    EXAM:  XR CHEST PORTABLE ROUTINE 1V                            PROCEDURE DATE:  2021            INTERPRETATION:  Chest one view    HISTORY: Postop    COMPARISON STUDY: Earlier the same day    Frontal expiratory view of the chest shows the heart to be similar in size. Endotracheal tube, nasogastric tube, right jugular sheath, sternal wires and bilateral chest tubes are present.    The lungs show mild pulmonary congestion and there is no evidence of pneumothorax nor pleural effusion.    IMPRESSION:  Mild congestive changes.    Thank you for the courtesy of this referral.    Assessment:  65M with PMH of HTN, T2DM, HLD, hypothyroidism, chronic pancreatitis p/w chest pain.    S/P C3L pre-op IABP  Cardiogenic Shock  Post op respiratory insufficiency  DMT2   HLD   CABG x3- LIMA to LAD, SVG to OM, SVG to PDA  Leukocytosis   Thrombocytopenia  Anemia     Plan:   ***Neuro***  Sedated with Precedex drips gtt for ventilator synchrony.   Assess neuro status per protocol when pt is off sedation.    ***Cardiovascular***  S/P C3L pre-op IABP  Cardiogenic Shock  CABG x3- LIMA to LAD, SVG to OM, SVG to PDA  Invasive hemodynamic monitoring, assess perfusion indices.   Continue pain management with ASA via rectal and oral  Hx of HTN c/w Nicardipine and Latanoprost to treat Hypertension  Hx of HLD c/w Atorvastatin  Hematocrit 39%  Lactate 0.9      ***Pulmonary***  Plans to extubate pt tonight  Post op respiratory insufficiency  On full vent support, requiring close monitoring of respiratory rate, breathing pattern, pulse oximetry monitoring, and intermittent blood gas analysis.   RR (machine): 14  TV (machine): 550  FiO2: 60  PEEP: 5  ITime: 1  MAP: 10  PIP: 24              ***Hematology***  Anemia, 12.9   Thrombocytopenia, Platelets count 114  H/H 12.1/39.   Continue to monitor hemoglobin and hematocrit levels.     ***GI***  Continue Pepcid for stress ulcer prophylaxis.   Bowel regimen with Miralax.   NPO after recent procedure, advance diet as tolerated.     ***Renal***  Continue to monitor I/Os, BUN/Creatinine, and urine output.   Goal net negative fluid balance. Replete lytes PRN. Keep K> 4 and Mg >2.     ***ID***  Leukocytosis WBC 14.37  Monitor temperature and trend WBC.   C/w Cefuroxime for preoperative coverage.  T max 36.7   C/w Mupirocin for post operative coverage     ***Endocrine***  History of Type 2 Diabetes Mellitus, requiring glucose control with insulin gtt, titrate as per insulin drip protocol along with hourly glucose checks.   C/w levothyroxine for hypothyroid     I have spent 30 minutes providing critical care management to this patient.    By signing my name below, I, Margarette Meehan, attest that this documentation has been prepared under the direction and in the presence of Holli Hdz MD   Electronically signed: Alonso Suarez, 21 @ 17:44    I, Holli Hdz, personally performed the services described in this documentation. all medical record entries made by the scribe were at my direction and in my presence. I have reviewed the chart and agree that the record reflects my personal performance and is accurate and complete  Electronically signed: Holli Hdz MD  Patient seen and examined at the bedside.      Remained critically ill on continuous ICU monitoring.    OBJECTIVE:  ICU Vital Signs Last 24 Hrs  T(C): 36.3 (12 Mar 2021 17:00), Max: 36.7 (11 Mar 2021 20:00)  T(F): 97.4 (12 Mar 2021 17:00), Max: 98 (11 Mar 2021 20:00)  HR: 74 (12 Mar 2021 17:) (52 - 79)  BP: --  BP(mean): --  ABP: 123/53 (12 Mar 2021 17:00) (97/48 - 129/49)  ABP(mean): 87 (12 Mar 2021 17:) (71 - 87)  RR: 15 (12 Mar 2021 17:) (12 - 26)  SpO2: 100% (12 Mar 2021 17:) (92% - 100%)    Mode: AC/ CMV (Assist Control/ Continuous Mandatory Ventilation), RR (machine): 14, TV (machine): 550, FiO2: 60, PEEP: 5, ITime: 1, MAP: 10, PIP: 24    03-11 @ 07:12 @ 07:00  --------------------------------------------------------  IN: 756 mL / OUT: 2875 mL / NET: -2119 mL     @ 07: @ 17:44  --------------------------------------------------------  IN: 499.4 mL / OUT: 425 mL / NET: 74.4 mL      CAPILLARY BLOOD GLUCOSE      POCT Blood Glucose.: 177 mg/dL (12 Mar 2021 15:58)        Review of Systems:  Unable to obatin, pt on sedatives.        Daily Height in cm: 175.26 (12 Mar 2021 07:00)    Daily Weight in k.1 (12 Mar 2021 06:00)    PHYSICAL EXAM:  General: Sedated intubated with multiple lines, gtt & drains   Neurology: sedated but responds to verbal stimuli  Eyes: PERRLA  ENT/Neck: Neck supple, trachea midline, No JVD, Gross hearing intact  Respiratory: + ETT midline  Rales noted bilaterally.                      Sternal dressing M CT X1, R/L Pleural CT                        Epicardial pacing wires   CV: RRR, S1S2, no murmurs, rubs or gallops  Abdominal: Soft, NT, ND +BS,   Extremities: 1-2+ pedal edema noted, + peripheral pulses  Skin: No Rashes, Hematoma, Ecchymosis      LINES:  [x ] Arterial Line   [x ] Central Line  [ ] PA catheter  [x ] IABP  [ ] ECMO  [ ] LVAD  [x ] Ventilator  [ ] pacemaker [ ] Impella    Beside evaluation, monitoring, treatment of hemodynamics, fluids, IVP/IVCD meds,         Ventilator ( x )  Mode: AC/ CMV (Assist Control/ Continuous Mandatory Ventilation)  RR (machine): 14  TV (machine): 550  FiO2: 60  PEEP: 5  ITime: 1  MAP: 10  PIP: 24              Hemodynamic lability, instability. Requires IVCD [ ] vasopressors [ ] inotropes  [ ] vasodilator  [x ]IVSS fluid  to maintain MAP, perfusion, C.I.     Ventilator Management:  [x ]AC-rest    [ ]CPAP-PS Wean    [ ]Trach Collar     [ ]Extubate    [ ] T-Piece  [ ]peep>5     ALL MEDICATIONS:  MEDICATIONS  (STANDING):  aspirin enteric coated 81 milliGRAM(s) Oral daily  aspirin Suppository 300 milliGRAM(s) Rectal once  atorvastatin 40 milliGRAM(s) Oral at bedtime  cefuroxime  IVPB 1500 milliGRAM(s) IV Intermittent every 8 hours  chlorhexidine 0.12% Liquid 15 milliLiter(s) Oral Mucosa every 12 hours  chlorhexidine 2% Cloths 1 Application(s) Topical <User Schedule>  dexMEDEtomidine Infusion 0.3 MICROgram(s)/kG/Hr (6.64 mL/Hr) IV Continuous <Continuous>  dextrose 50% Injectable 50 milliLiter(s) IV Push every 15 minutes  dextrose 50% Injectable 25 milliLiter(s) IV Push every 15 minutes  famotidine Injectable 20 milliGRAM(s) IV Push every 12 hours  insulin regular Infusion 3 Unit(s)/Hr (3 mL/Hr) IV Continuous <Continuous>  latanoprost 0.005% Ophthalmic Solution 1 Drop(s) Both EYES at bedtime  levothyroxine 100 MICROGram(s) Oral daily  mupirocin 2% Ointment 1 Application(s) Topical two times a day  niCARdipine Infusion 5 mG/Hr (25 mL/Hr) IV Continuous <Continuous>  polyethylene glycol 3350 17 Gram(s) Oral daily  potassium chloride  10 mEq/50 mL IVPB 10 milliEquivalent(s) IV Intermittent every 1 hour  potassium chloride  10 mEq/50 mL IVPB 10 milliEquivalent(s) IV Intermittent every 1 hour  potassium chloride  10 mEq/50 mL IVPB 10 milliEquivalent(s) IV Intermittent every 1 hour  sodium chloride 0.9%. 1000 milliLiter(s) (10 mL/Hr) IV Continuous <Continuous>    MEDICATIONS  (PRN):  BACItracin   Ointment 1 Application(s) Topical every 12 hours PRN Pain/itching      LABS:                        12.9   14.37 )-----------( 114      ( 12 Mar 2021 14:09 )             39.1         137  |  106  |  15  ----------------------------<  211<H>  4.2   |  23  |  0.87    Ca    9.2      12 Mar 2021 14:08  Phos  2.8     12  Mg     2.1         TPro  5.3<L>  /  Alb  3.2<L>  /  TBili  1.2  /  DBili  x   /  AST  51<H>  /  ALT  23  /  AlkPhos  79  03-12    PT/INR - ( 12 Mar 2021 14:08 )   PT: 13.3 sec;   INR: 1.11 ratio         PTT - ( 12 Mar 2021 14:08 )  PTT:30.8 sec    Arterial Blood Gas:   @ 16:54  7.40/39/81/24/96/-.6  ABG lactate: --  Arterial Blood Gas:   @ 15:08  7.34/43/137/23/99/-2.1  ABG lactate: --  Arterial Blood Gas:   @ 14:10  7.34/47/133/25/98/-.7  ABG lactate: --  Arterial Blood Gas:   @ 12:09  7.41/40/188/24/99/.4  ABG lactate: --  Arterial Blood Gas:   @ 11:04  7.39/44/485/26/100/1.4  ABG lactate: --  Arterial Blood Gas:   @ 10:32  7.37/48/582/27/100/1.8  ABG lactate: --  Arterial Blood Gas:   @ 10:04  7.33/40/566//100/-4.5  ABG lactate: --  Arterial Blood Gas:   @ 09:21  7.30/52/71//92/-1.8  ABG lactate: --  Arterial Blood Gas:   @ 08:20  7.43/32/377//100/-2.0  ABG lactate: --    Venous Blood Gas:   @ 11:05  7.36/48/61//91  VBG Lactate: 1.1  Venous Blood Gas:   @ 10:33  7.34/50/69//94  VBG Lactate: 0.9  Venous Blood Gas:   @ 10:04  7.30/47/68//  VBG Lactate: 0.7  Venous Blood Gas:   @ 17:44  --/--/--/--/--  VBG Lactate: 0.9    CARDIAC MARKERS ( 12 Mar 2021 14:08 )  x     / x     / 576 U/L / x     / 62.2 ng/mL    LIVER FUNCTIONS - ( 12 Mar 2021 14:08 )  Alb: 3.2 g/dL / Pro: 5.3 g/dL / ALK PHOS: 79 U/L / ALT: 23 U/L / AST: 51 U/L / GGT: x               MICROBIOLOGY:   N/A    RADIOLOGY:    Patient name: SELENE BE  YOB: 1955   Age: 65 (M)   MR#: [id number]  Study Date: 3/12/2021  [facility]  Study quality: Technically good  Referring Physician: [employer] [facility] M.D  Blood Pressure: 156/59 mmHg  Height: 175 cm  Weight: 88 kg  BSA: 2 m2  Heart Rate: 60 mmHg  ------------------------------------------------------------------------  PROCEDURE: Intra operative transeophageal echocardiogram  with 2D, M modeand complete  Doppler examination.  The  transesophageal probe was placed in the esophagus after  induction of anesthesia. Real-time and reconstructed  3-dimensional imaging was performed.  Color Doppler  analysis was carried out.  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 No mitral regurgitation  seen.  Aortic Valve/Aorta: Normal trileaflet aortic valve. No  aortic valve regurgitation seen.  Ascending Aorta: 3.2 cm.  Left Atrium: Normal left atrium.  Left Ventricle: Normal left ventricular systolic function.  The mid inferior wall is hypokinetic. The basal inferior  wall is akinetic.  Normal left ventricular internal  dimensions and wall thicknesses.  Right Heart: Normal right atrium. Normal right ventricular  size and function. Normal tricuspid valve. No tricuspid  regurgitation. Normal pulmonic valve. No pulmonic  regurgitation.  Pericardium/Pleura: Normal pericardium with no pericardial  effusion.  Hemodynamic: Color Doppler demonstrates no evidence of a  patent foramen ovale.  ------------------------------------------------------------------------  Post-Bypass Observations:    s/p CABG  LIMA-LAD,SVG-OM, SVG-PDA  Improved LV RWMA.  Inferior wall hypokinetic.  LVEF 60-65%  on minimal norepi support.  Normal RV function.  No valvular abnormalities.  All other exams unchanged from pre-CPB.  ------------------------------------------------------------------------  Conclusions:  Confirmed on  3/12/2021 - 12:18:36 by Thomas Cornelius M.D.  ------------------------------------------------------------------------    EXAM:  XR CHEST PORTABLE ROUTINE 1V                            PROCEDURE DATE:  2021            INTERPRETATION:  Chest one view    HISTORY: Postop    COMPARISON STUDY: Earlier the same day    Frontal expiratory view of the chest shows the heart to be similar in size. Endotracheal tube, nasogastric tube, right jugular sheath, sternal wires and bilateral chest tubes are present.    The lungs show mild pulmonary congestion and there is no evidence of pneumothorax nor pleural effusion.    IMPRESSION:  Mild congestive changes.    Thank you for the courtesy of this referral.    Assessment:  65M with PMH of HTN, T2DM, HLD, hypothyroidism, chronic pancreatitis p/w chest pain.    S/P C3L pre-op IABP POD #0  Hypovolemia   Post op respiratory insufficiency increased A-a gradient  DMT2 w Hyperglycemia  HLD   HTN  Acquired hypothyroidism     Plan:   ***Neuro***  Sedated with Precedex drips gtt for ventilator synchrony.   Assess neuro status per protocol when pt is off sedation.    ***Cardiovascular***  S/P C3L pre-op IABP  CABG x3- LIMA to LAD, SVG to OM, SVG to PDA  Invasive hemodynamic monitoring, assess perfusion indices.   Hemodynamically significant hypovolemia   Episodes of severe HTN requiring IV vasodilators   L FA IABP with good diastolic augmentation    monitor LE perfusion  SR/ Epicardial pacing in place   monitor for nawaf/ tachy arrthymia  ASA/ Atorvastatin  Hematocrit 39% related to hemoconcentration cont volume loading   Plan once extubated & hemodynamically stable wean IABP        ***Pulmonary***    Titrate FIO2 & Peep   On full vent support, requiring close monitoring of respiratory rate, breathing pattern, pulse oximetry monitoring, and intermittent blood gas analysis.   RR (machine): 14  TV (machine): 550  FiO2: 60  PEEP: 5  ITime: 1  MAP: 10  PIP: 24  maintain all chest tubes monitor for any air leaks          Plan to extubate once improvement in Po2     ***Hematology***  Anemia, 12.9   Thrombocytopenia, Platelets count 114  H/H 12.1/39.   Continue to monitor hemoglobin and hematocrit levels.     ***GI***  Continue Pepcid for stress ulcer prophylaxis.   Bowel regimen with Miralax.   NPO after recent procedure, advance diet as tolerated.     ***Renal***  Continue to monitor I/Os, BUN/Creatinine, and urine output.   Goal net negative fluid balance. Replete lytes PRN. Keep K> 4 and Mg >2.     ***ID***  Leukocytosis WBC 14.37  Monitor temperature and trend WBC.   C/w Cefuroxime for preoperative coverage.  T max 36.7   C/w Mupirocin for post operative coverage     ***Endocrine***  History of Type 2 Diabetes Mellitus, requiring glucose control with insulin gtt, titrate as per insulin drip protocol along with hourly glucose checks.   C/w levothyroxine for hypothyroid     I have spent 30 minutes providing critical care management to this patient.    By signing my name below, IMargarette, attest that this documentation has been prepared under the direction and in the presence of Holli Hdz MD   Electronically signed: Alonso Suarez, 21 @ 17:44    I, Holli Hdz, personally performed the services described in this documentation. all medical record entries made by the scribe were at my direction and in my presence. I have reviewed the chart and agree that the record reflects my personal performance and is accurate and complete  Electronically signed: Holli Hdz MD  Patient seen and examined at the bedside.      Remained critically ill on continuous ICU monitoring.    OBJECTIVE:  ICU Vital Signs Last 24 Hrs  T(C): 36.3 (12 Mar 2021 17:00), Max: 36.7 (11 Mar 2021 20:00)  T(F): 97.4 (12 Mar 2021 17:00), Max: 98 (11 Mar 2021 20:00)  HR: 74 (12 Mar 2021 17:) (52 - 79)  BP: --  BP(mean): --  ABP: 123/53 (12 Mar 2021 17:00) (97/48 - 129/49)  ABP(mean): 87 (12 Mar 2021 17:) (71 - 87)  RR: 15 (12 Mar 2021 17:) (12 - 26)  SpO2: 100% (12 Mar 2021 17:) (92% - 100%)    Mode: AC/ CMV (Assist Control/ Continuous Mandatory Ventilation), RR (machine): 14, TV (machine): 550, FiO2: 60, PEEP: 5, ITime: 1, MAP: 10, PIP: 24    03-11 @ 07:12 @ 07:00  --------------------------------------------------------  IN: 756 mL / OUT: 2875 mL / NET: -2119 mL     @ 07: @ 17:44  --------------------------------------------------------  IN: 499.4 mL / OUT: 425 mL / NET: 74.4 mL      CAPILLARY BLOOD GLUCOSE      POCT Blood Glucose.: 177 mg/dL (12 Mar 2021 15:58)        Review of Systems:  Unable to obatin, pt on sedatives.        Daily Height in cm: 175.26 (12 Mar 2021 07:00)    Daily Weight in k.1 (12 Mar 2021 06:00)    PHYSICAL EXAM:  General: Sedated intubated with multiple lines, gtt & drains   Neurology: sedated but responds to verbal stimuli  Eyes: PERRLA  ENT/Neck: Neck supple, trachea midline, No JVD, Gross hearing intact  Respiratory: + ETT midline  Rales noted bilaterally.                      Sternal dressing M CT X1, R/L Pleural CT                        Epicardial pacing wires   CV: RRR, S1S2, no murmurs, rubs or gallops  Abdominal: Soft, NT, ND +BS,   Extremities: 1-2+ pedal edema noted, + peripheral pulses L FA IABP  Skin: No Rashes, Hematoma, Ecchymosis      LINES:  [x ] Arterial Line   [x ] Central Line  [ ] PA catheter  [x ] IABP  [ ] ECMO  [ ] LVAD  [x ] Ventilator  [ ] pacemaker [ ] Impella    Beside evaluation, monitoring, treatment of hemodynamics, fluids, IVP/IVCD meds,         Ventilator ( x )  Mode: AC/ CMV (Assist Control/ Continuous Mandatory Ventilation)  RR (machine): 14  TV (machine): 550  FiO2: 60  PEEP: 5  ITime: 1  MAP: 10  PIP: 24              Hemodynamic lability, instability. Requires IVCD [ ] vasopressors [ ] inotropes  [ ] vasodilator  [x ]IVSS fluid  to maintain MAP, perfusion, C.I.     Ventilator Management:  [x ]AC-rest    [ ]CPAP-PS Wean    [ ]Trach Collar     [ ]Extubate    [ ] T-Piece  [ ]peep>5     ALL MEDICATIONS:  MEDICATIONS  (STANDING):  aspirin enteric coated 81 milliGRAM(s) Oral daily  aspirin Suppository 300 milliGRAM(s) Rectal once  atorvastatin 40 milliGRAM(s) Oral at bedtime  cefuroxime  IVPB 1500 milliGRAM(s) IV Intermittent every 8 hours  chlorhexidine 0.12% Liquid 15 milliLiter(s) Oral Mucosa every 12 hours  chlorhexidine 2% Cloths 1 Application(s) Topical <User Schedule>  dexMEDEtomidine Infusion 0.3 MICROgram(s)/kG/Hr (6.64 mL/Hr) IV Continuous <Continuous>  dextrose 50% Injectable 50 milliLiter(s) IV Push every 15 minutes  dextrose 50% Injectable 25 milliLiter(s) IV Push every 15 minutes  famotidine Injectable 20 milliGRAM(s) IV Push every 12 hours  insulin regular Infusion 3 Unit(s)/Hr (3 mL/Hr) IV Continuous <Continuous>  latanoprost 0.005% Ophthalmic Solution 1 Drop(s) Both EYES at bedtime  levothyroxine 100 MICROGram(s) Oral daily  mupirocin 2% Ointment 1 Application(s) Topical two times a day  niCARdipine Infusion 5 mG/Hr (25 mL/Hr) IV Continuous <Continuous>  polyethylene glycol 3350 17 Gram(s) Oral daily  potassium chloride  10 mEq/50 mL IVPB 10 milliEquivalent(s) IV Intermittent every 1 hour  potassium chloride  10 mEq/50 mL IVPB 10 milliEquivalent(s) IV Intermittent every 1 hour  potassium chloride  10 mEq/50 mL IVPB 10 milliEquivalent(s) IV Intermittent every 1 hour  sodium chloride 0.9%. 1000 milliLiter(s) (10 mL/Hr) IV Continuous <Continuous>    MEDICATIONS  (PRN):  BACItracin   Ointment 1 Application(s) Topical every 12 hours PRN Pain/itching      LABS:                        12.9   14.37 )-----------( 114      ( 12 Mar 2021 14:09 )             39.1         137  |  106  |  15  ----------------------------<  211<H>  4.2   |  23  |  0.87    Ca    9.2      12 Mar 2021 14:08  Phos  2.8       Mg     2.1         TPro  5.3<L>  /  Alb  3.2<L>  /  TBili  1.2  /  DBili  x   /  AST  51<H>  /  ALT  23  /  AlkPhos  79  03-12    PT/INR - ( 12 Mar 2021 14:08 )   PT: 13.3 sec;   INR: 1.11 ratio         PTT - ( 12 Mar 2021 14:08 )  PTT:30.8 sec    Arterial Blood Gas:   @ 16:54  7.40/39/81/24/96/-.6  ABG lactate: --  Arterial Blood Gas:   @ 15:08  7.34/43/137/23/99/-2.1  ABG lactate: --  Arterial Blood Gas:   @ 14:10  7.34/47/133/25/98/-.7  ABG lactate: --  Arterial Blood Gas:   @ 12:09  7.41/40/188/24/99/.4  ABG lactate: --  Arterial Blood Gas:   @ 11:04  7.39/44/485/26/100/1.4  ABG lactate: --  Arterial Blood Gas:   @ 10:32  7.37/48/582//100/1.8  ABG lactate: --  Arterial Blood Gas:   @ 10:04  7.33/40/566//100/-4.5  ABG lactate: --  Arterial Blood Gas:   @ 09:21  7.30/52/71//92/-1.8  ABG lactate: --  Arterial Blood Gas:   @ 08:20  7.43/32/377//100/-2.0  ABG lactate: --    Venous Blood Gas:   @ 11:05  7.36/48/61//91  VBG Lactate: 1.1  Venous Blood Gas:   @ 10:33  7.34/50/69//94  VBG Lactate: 0.9  Venous Blood Gas:   @ 10:04  7.30/47/68//92  VBG Lactate: 0.7  Venous Blood Gas:   @ 17:44  --/--/--/--/--  VBG Lactate: 0.9    CARDIAC MARKERS ( 12 Mar 2021 14:08 )  x     / x     / 576 U/L / x     / 62.2 ng/mL    LIVER FUNCTIONS - ( 12 Mar 2021 14:08 )  Alb: 3.2 g/dL / Pro: 5.3 g/dL / ALK PHOS: 79 U/L / ALT: 23 U/L / AST: 51 U/L / GGT: x               MICROBIOLOGY:   N/A    RADIOLOGY:    Patient name: SELENE BE  YOB: 1955   Age: 65 (M)   MR#: [id number]  Study Date: 3/12/2021  [facility] [place] [facility] Cornelius M[facility]  Study quality: Technically good  Referring Physician: [employer] Zack In Venus ILANG M.D  Blood Pressure: 156/59 mmHg  Height: 175 cm  Weight: 88 kg  BSA: 2 m2  Heart Rate: 60 mmHg  ------------------------------------------------------------------------  PROCEDURE: Intra operative transeophageal echocardiogram  with 2D, M modeand complete  Doppler examination.  The  transesophageal probe was placed in the esophagus after  induction of anesthesia. Real-time and reconstructed  3-dimensional imaging was performed.  Color Doppler  analysis was carried out.  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 No mitral regurgitation  seen.  Aortic Valve/Aorta: Normal trileaflet aortic valve. No  aortic valve regurgitation seen.  Ascending Aorta: 3.2 cm.  Left Atrium: Normal left atrium.  Left Ventricle: Normal left ventricular systolic function.  The mid inferior wall is hypokinetic. The basal inferior  wall is akinetic.  Normal left ventricular internal  dimensions and wall thicknesses.  Right Heart: Normal right atrium. Normal right ventricular  size and function. Normal tricuspid valve. No tricuspid  regurgitation. Normal pulmonic valve. No pulmonic  regurgitation.  Pericardium/Pleura: Normal pericardium with no pericardial  effusion.  Hemodynamic: Color Doppler demonstrates no evidence of a  patent foramen ovale.  ------------------------------------------------------------------------  Post-Bypass Observations:    s/p CABG  LIMA-LAD,SVG-OM, SVG-PDA  Improved LV RWMA.  Inferior wall hypokinetic.  LVEF 60-65%  on minimal norepi support.  Normal RV function.  No valvular abnormalities.  All other exams unchanged from pre-CPB.  ------------------------------------------------------------------------  Conclusions:  Confirmed on  3/12/2021 - 12:18:36 by Thomas Cornelius M.D.  ------------------------------------------------------------------------    EXAM:  XR CHEST PORTABLE ROUTINE 1V                            PROCEDURE DATE:  2021            INTERPRETATION:  Chest one view    HISTORY: Postop    COMPARISON STUDY: Earlier the same day    Frontal expiratory view of the chest shows the heart to be similar in size. Endotracheal tube, nasogastric tube, right jugular sheath, sternal wires and bilateral chest tubes are present.    The lungs show mild pulmonary congestion and there is no evidence of pneumothorax nor pleural effusion.    IMPRESSION:  Mild congestive changes.    Thank you for the courtesy of this referral.    Assessment:  65M with PMH of HTN, T2DM, HLD, hypothyroidism, chronic pancreatitis p/w chest pain.    S/P C3L pre-op IABP POD #0  Hypovolemia   Post op respiratory insufficiency increased A-a gradient  DMT2 w Hyperglycemia  HLD   HTN  Acquired hypothyroidism     Plan:   ***Neuro***  Sedated with Precedex drips gtt for ventilator synchrony.   Assess neuro status per protocol when pt is off sedation.    ***Cardiovascular***  S/P C3L pre-op IABP  CABG x3- LIMA to LAD, SVG to OM, SVG to PDA  Invasive hemodynamic monitoring, assess perfusion indices.   Hemodynamically significant hypovolemia   Episodes of severe HTN requiring IV vasodilators   L FA IABP with good diastolic augmentation    monitor LE perfusion  SR/ Epicardial pacing in place   monitor for nawaf/ tachy arrthymia  ASA/ Atorvastatin  Hematocrit 39% related to hemoconcentration cont volume loading   Plan once extubated & hemodynamically stable wean IABP        ***Pulmonary***    Titrate FIO2 & Peep   On full vent support, requiring close monitoring of respiratory rate, breathing pattern, pulse oximetry monitoring, and intermittent blood gas analysis.   RR (machine): 14  TV (machine): 550  FiO2: 60  PEEP: 5  ITime: 1  MAP: 10  PIP: 24  maintain all chest tubes monitor for any air leaks          Plan to extubate once improvement in Po2     ***Hematology***  Anemia, 12.9   Thrombocytopenia, Platelets count 114  H/H 12.1/39.   Continue to monitor hemoglobin and hematocrit levels.     ***GI***  Continue Pepcid for stress ulcer prophylaxis.   Bowel regimen with Miralax.   NPO after recent procedure, advance diet as tolerated.     ***Renal***  Continue to monitor I/Os, BUN/Creatinine, and urine output.   Goal net negative fluid balance. Replete lytes PRN. Keep K> 4 and Mg >2.     ***ID***  Leukocytosis WBC 14.37  Monitor temperature and trend WBC.   C/w Cefuroxime for preoperative coverage.  T max 36.7   C/w Mupirocin for post operative coverage     ***Endocrine***  History of Type 2 Diabetes Mellitus, requiring glucose control with insulin gtt, titrate as per insulin drip protocol along with hourly glucose checks.   C/w levothyroxine for hypothyroid     I have spent 30 minutes providing critical care management to this patient.    By signing my name below, IMargarette, attest that this documentation has been prepared under the direction and in the presence of Holli Hdz MD   Electronically signed: Alonso Suarez, 21 @ 17:44    I, Holli Hdz, personally performed the services described in this documentation. all medical record entries made by the scribe were at my direction and in my presence. I have reviewed the chart and agree that the record reflects my personal performance and is accurate and complete  Electronically signed: Holli Hdz MD

## 2021-03-13 LAB
ALBUMIN SERPL ELPH-MCNC: 3.7 G/DL — SIGNIFICANT CHANGE UP (ref 3.3–5)
ALP SERPL-CCNC: 66 U/L — SIGNIFICANT CHANGE UP (ref 40–120)
ALT FLD-CCNC: 22 U/L — SIGNIFICANT CHANGE UP (ref 10–45)
ANION GAP SERPL CALC-SCNC: 9 MMOL/L — SIGNIFICANT CHANGE UP (ref 5–17)
APTT BLD: 28.6 SEC — SIGNIFICANT CHANGE UP (ref 27.5–35.5)
AST SERPL-CCNC: 53 U/L — HIGH (ref 10–40)
BASE EXCESS BLDV CALC-SCNC: -1.6 MMOL/L — SIGNIFICANT CHANGE UP (ref -2–2)
BASE EXCESS BLDV CALC-SCNC: -2 MMOL/L — SIGNIFICANT CHANGE UP (ref -2–2)
BASE EXCESS BLDV CALC-SCNC: -2.9 MMOL/L — LOW (ref -2–2)
BILIRUB SERPL-MCNC: 0.6 MG/DL — SIGNIFICANT CHANGE UP (ref 0.2–1.2)
BUN SERPL-MCNC: 18 MG/DL — SIGNIFICANT CHANGE UP (ref 7–23)
CALCIUM SERPL-MCNC: 8.8 MG/DL — SIGNIFICANT CHANGE UP (ref 8.4–10.5)
CHLORIDE SERPL-SCNC: 110 MMOL/L — HIGH (ref 96–108)
CO2 BLDV-SCNC: 23 MMOL/L — SIGNIFICANT CHANGE UP (ref 22–30)
CO2 BLDV-SCNC: 24 MMOL/L — SIGNIFICANT CHANGE UP (ref 22–30)
CO2 BLDV-SCNC: 24 MMOL/L — SIGNIFICANT CHANGE UP (ref 22–30)
CO2 SERPL-SCNC: 23 MMOL/L — SIGNIFICANT CHANGE UP (ref 22–31)
CREAT SERPL-MCNC: 0.98 MG/DL — SIGNIFICANT CHANGE UP (ref 0.5–1.3)
GAS PNL BLDA: SIGNIFICANT CHANGE UP
GAS PNL BLDV: SIGNIFICANT CHANGE UP
GLUCOSE BLDC GLUCOMTR-MCNC: 107 MG/DL — HIGH (ref 70–99)
GLUCOSE BLDC GLUCOMTR-MCNC: 107 MG/DL — HIGH (ref 70–99)
GLUCOSE BLDC GLUCOMTR-MCNC: 108 MG/DL — HIGH (ref 70–99)
GLUCOSE BLDC GLUCOMTR-MCNC: 111 MG/DL — HIGH (ref 70–99)
GLUCOSE BLDC GLUCOMTR-MCNC: 112 MG/DL — HIGH (ref 70–99)
GLUCOSE BLDC GLUCOMTR-MCNC: 119 MG/DL — HIGH (ref 70–99)
GLUCOSE BLDC GLUCOMTR-MCNC: 121 MG/DL — HIGH (ref 70–99)
GLUCOSE BLDC GLUCOMTR-MCNC: 122 MG/DL — HIGH (ref 70–99)
GLUCOSE BLDC GLUCOMTR-MCNC: 123 MG/DL — HIGH (ref 70–99)
GLUCOSE BLDC GLUCOMTR-MCNC: 129 MG/DL — HIGH (ref 70–99)
GLUCOSE BLDC GLUCOMTR-MCNC: 131 MG/DL — HIGH (ref 70–99)
GLUCOSE BLDC GLUCOMTR-MCNC: 138 MG/DL — HIGH (ref 70–99)
GLUCOSE BLDC GLUCOMTR-MCNC: 141 MG/DL — HIGH (ref 70–99)
GLUCOSE BLDC GLUCOMTR-MCNC: 143 MG/DL — HIGH (ref 70–99)
GLUCOSE BLDC GLUCOMTR-MCNC: 144 MG/DL — HIGH (ref 70–99)
GLUCOSE BLDC GLUCOMTR-MCNC: 148 MG/DL — HIGH (ref 70–99)
GLUCOSE BLDC GLUCOMTR-MCNC: 165 MG/DL — HIGH (ref 70–99)
GLUCOSE BLDC GLUCOMTR-MCNC: 91 MG/DL — SIGNIFICANT CHANGE UP (ref 70–99)
GLUCOSE SERPL-MCNC: 94 MG/DL — SIGNIFICANT CHANGE UP (ref 70–99)
HCO3 BLDV-SCNC: 22 MMOL/L — SIGNIFICANT CHANGE UP (ref 21–29)
HCO3 BLDV-SCNC: 23 MMOL/L — SIGNIFICANT CHANGE UP (ref 21–29)
HCO3 BLDV-SCNC: 23 MMOL/L — SIGNIFICANT CHANGE UP (ref 21–29)
HCT VFR BLD CALC: 34.2 % — LOW (ref 39–50)
HGB BLD-MCNC: 11.2 G/DL — LOW (ref 13–17)
HOROWITZ INDEX BLDV+IHG-RTO: 40 — SIGNIFICANT CHANGE UP
HOROWITZ INDEX BLDV+IHG-RTO: 44 — SIGNIFICANT CHANGE UP
HOROWITZ INDEX BLDV+IHG-RTO: 44 — SIGNIFICANT CHANGE UP
INR BLD: 1.17 RATIO — HIGH (ref 0.88–1.16)
MAGNESIUM SERPL-MCNC: 2.4 MG/DL — SIGNIFICANT CHANGE UP (ref 1.6–2.6)
MCHC RBC-ENTMCNC: 27.5 PG — SIGNIFICANT CHANGE UP (ref 27–34)
MCHC RBC-ENTMCNC: 32.7 GM/DL — SIGNIFICANT CHANGE UP (ref 32–36)
MCV RBC AUTO: 84 FL — SIGNIFICANT CHANGE UP (ref 80–100)
NRBC # BLD: 0 /100 WBCS — SIGNIFICANT CHANGE UP (ref 0–0)
PCO2 BLDV: 41 MMHG — SIGNIFICANT CHANGE UP (ref 35–50)
PCO2 BLDV: 42 MMHG — SIGNIFICANT CHANGE UP (ref 35–50)
PCO2 BLDV: 42 MMHG — SIGNIFICANT CHANGE UP (ref 35–50)
PH BLDV: 7.35 — SIGNIFICANT CHANGE UP (ref 7.35–7.45)
PH BLDV: 7.36 — SIGNIFICANT CHANGE UP (ref 7.35–7.45)
PH BLDV: 7.36 — SIGNIFICANT CHANGE UP (ref 7.35–7.45)
PHOSPHATE SERPL-MCNC: 3.5 MG/DL — SIGNIFICANT CHANGE UP (ref 2.5–4.5)
PLATELET # BLD AUTO: 106 K/UL — LOW (ref 150–400)
PO2 BLDV: 38 MMHG — SIGNIFICANT CHANGE UP (ref 25–45)
PO2 BLDV: 40 MMHG — SIGNIFICANT CHANGE UP (ref 25–45)
PO2 BLDV: 40 MMHG — SIGNIFICANT CHANGE UP (ref 25–45)
POTASSIUM SERPL-MCNC: 4.2 MMOL/L — SIGNIFICANT CHANGE UP (ref 3.5–5.3)
POTASSIUM SERPL-SCNC: 4.2 MMOL/L — SIGNIFICANT CHANGE UP (ref 3.5–5.3)
PROT SERPL-MCNC: 5.8 G/DL — LOW (ref 6–8.3)
PROTHROM AB SERPL-ACNC: 13.9 SEC — HIGH (ref 10.6–13.6)
RBC # BLD: 4.07 M/UL — LOW (ref 4.2–5.8)
RBC # FLD: 13.5 % — SIGNIFICANT CHANGE UP (ref 10.3–14.5)
SAO2 % BLDV: 70 % — SIGNIFICANT CHANGE UP (ref 67–88)
SAO2 % BLDV: 72 % — SIGNIFICANT CHANGE UP (ref 67–88)
SAO2 % BLDV: 73 % — SIGNIFICANT CHANGE UP (ref 67–88)
SODIUM SERPL-SCNC: 142 MMOL/L — SIGNIFICANT CHANGE UP (ref 135–145)
WBC # BLD: 10.75 K/UL — HIGH (ref 3.8–10.5)
WBC # FLD AUTO: 10.75 K/UL — HIGH (ref 3.8–10.5)

## 2021-03-13 PROCEDURE — 33968 REMOVE AORTIC ASSIST DEVICE: CPT

## 2021-03-13 PROCEDURE — 93010 ELECTROCARDIOGRAM REPORT: CPT

## 2021-03-13 PROCEDURE — 99291 CRITICAL CARE FIRST HOUR: CPT | Mod: 25

## 2021-03-13 PROCEDURE — 71045 X-RAY EXAM CHEST 1 VIEW: CPT | Mod: 26

## 2021-03-13 RX ORDER — METOPROLOL TARTRATE 50 MG
25 TABLET ORAL EVERY 8 HOURS
Refills: 0 | Status: DISCONTINUED | OUTPATIENT
Start: 2021-03-13 | End: 2021-03-15

## 2021-03-13 RX ORDER — HYDROMORPHONE HYDROCHLORIDE 2 MG/ML
0.5 INJECTION INTRAMUSCULAR; INTRAVENOUS; SUBCUTANEOUS ONCE
Refills: 0 | Status: DISCONTINUED | OUTPATIENT
Start: 2021-03-13 | End: 2021-03-13

## 2021-03-13 RX ORDER — HYDROMORPHONE HYDROCHLORIDE 2 MG/ML
1 INJECTION INTRAMUSCULAR; INTRAVENOUS; SUBCUTANEOUS ONCE
Refills: 0 | Status: DISCONTINUED | OUTPATIENT
Start: 2021-03-13 | End: 2021-03-13

## 2021-03-13 RX ORDER — FENTANYL CITRATE 50 UG/ML
50 INJECTION INTRAVENOUS ONCE
Refills: 0 | Status: DISCONTINUED | OUTPATIENT
Start: 2021-03-13 | End: 2021-03-13

## 2021-03-13 RX ORDER — ALBUMIN HUMAN 25 %
250 VIAL (ML) INTRAVENOUS
Refills: 0 | Status: COMPLETED | OUTPATIENT
Start: 2021-03-13 | End: 2021-03-13

## 2021-03-13 RX ORDER — ACETAMINOPHEN 500 MG
1000 TABLET ORAL ONCE
Refills: 0 | Status: COMPLETED | OUTPATIENT
Start: 2021-03-13 | End: 2021-03-13

## 2021-03-13 RX ORDER — METOPROLOL TARTRATE 50 MG
12.5 TABLET ORAL EVERY 8 HOURS
Refills: 0 | Status: DISCONTINUED | OUTPATIENT
Start: 2021-03-13 | End: 2021-03-13

## 2021-03-13 RX ORDER — OXYCODONE AND ACETAMINOPHEN 5; 325 MG/1; MG/1
2 TABLET ORAL EVERY 4 HOURS
Refills: 0 | Status: DISCONTINUED | OUTPATIENT
Start: 2021-03-13 | End: 2021-03-14

## 2021-03-13 RX ORDER — NICARDIPINE HYDROCHLORIDE 30 MG/1
5 CAPSULE, EXTENDED RELEASE ORAL
Qty: 40 | Refills: 0 | Status: DISCONTINUED | OUTPATIENT
Start: 2021-03-13 | End: 2021-03-14

## 2021-03-13 RX ORDER — HEPARIN SODIUM 5000 [USP'U]/ML
5000 INJECTION INTRAVENOUS; SUBCUTANEOUS EVERY 8 HOURS
Refills: 0 | Status: DISCONTINUED | OUTPATIENT
Start: 2021-03-13 | End: 2021-03-17

## 2021-03-13 RX ORDER — OXYCODONE AND ACETAMINOPHEN 5; 325 MG/1; MG/1
1 TABLET ORAL EVERY 4 HOURS
Refills: 0 | Status: DISCONTINUED | OUTPATIENT
Start: 2021-03-13 | End: 2021-03-14

## 2021-03-13 RX ORDER — FAMOTIDINE 10 MG/ML
20 INJECTION INTRAVENOUS DAILY
Refills: 0 | Status: DISCONTINUED | OUTPATIENT
Start: 2021-03-13 | End: 2021-03-17

## 2021-03-13 RX ORDER — HYDRALAZINE HCL 50 MG
10 TABLET ORAL ONCE
Refills: 0 | Status: COMPLETED | OUTPATIENT
Start: 2021-03-13 | End: 2021-03-13

## 2021-03-13 RX ORDER — ALBUMIN HUMAN 25 %
250 VIAL (ML) INTRAVENOUS ONCE
Refills: 0 | Status: COMPLETED | OUTPATIENT
Start: 2021-03-13 | End: 2021-03-13

## 2021-03-13 RX ADMIN — HYDROMORPHONE HYDROCHLORIDE 0.5 MILLIGRAM(S): 2 INJECTION INTRAMUSCULAR; INTRAVENOUS; SUBCUTANEOUS at 12:30

## 2021-03-13 RX ADMIN — FAMOTIDINE 20 MILLIGRAM(S): 10 INJECTION INTRAVENOUS at 05:00

## 2021-03-13 RX ADMIN — Medication 81 MILLIGRAM(S): at 12:46

## 2021-03-13 RX ADMIN — Medication 1500 MILLILITER(S): at 01:48

## 2021-03-13 RX ADMIN — HYDROMORPHONE HYDROCHLORIDE 0.5 MILLIGRAM(S): 2 INJECTION INTRAMUSCULAR; INTRAVENOUS; SUBCUTANEOUS at 12:45

## 2021-03-13 RX ADMIN — OXYCODONE AND ACETAMINOPHEN 2 TABLET(S): 5; 325 TABLET ORAL at 20:47

## 2021-03-13 RX ADMIN — LATANOPROST 1 DROP(S): 0.05 SOLUTION/ DROPS OPHTHALMIC; TOPICAL at 22:47

## 2021-03-13 RX ADMIN — HYDROMORPHONE HYDROCHLORIDE 1 MILLIGRAM(S): 2 INJECTION INTRAMUSCULAR; INTRAVENOUS; SUBCUTANEOUS at 22:47

## 2021-03-13 RX ADMIN — OXYCODONE AND ACETAMINOPHEN 2 TABLET(S): 5; 325 TABLET ORAL at 16:35

## 2021-03-13 RX ADMIN — MUPIROCIN 1 APPLICATION(S): 20 OINTMENT TOPICAL at 05:00

## 2021-03-13 RX ADMIN — HYDROMORPHONE HYDROCHLORIDE 1 MILLIGRAM(S): 2 INJECTION INTRAMUSCULAR; INTRAVENOUS; SUBCUTANEOUS at 23:17

## 2021-03-13 RX ADMIN — HYDROMORPHONE HYDROCHLORIDE 1 MILLIGRAM(S): 2 INJECTION INTRAMUSCULAR; INTRAVENOUS; SUBCUTANEOUS at 21:15

## 2021-03-13 RX ADMIN — Medication 100 MILLIGRAM(S): at 08:46

## 2021-03-13 RX ADMIN — OXYCODONE AND ACETAMINOPHEN 2 TABLET(S): 5; 325 TABLET ORAL at 20:17

## 2021-03-13 RX ADMIN — CHLORHEXIDINE GLUCONATE 1 APPLICATION(S): 213 SOLUTION TOPICAL at 05:00

## 2021-03-13 RX ADMIN — Medication 10 MILLIGRAM(S): at 16:20

## 2021-03-13 RX ADMIN — HYDROMORPHONE HYDROCHLORIDE 0.5 MILLIGRAM(S): 2 INJECTION INTRAMUSCULAR; INTRAVENOUS; SUBCUTANEOUS at 01:10

## 2021-03-13 RX ADMIN — HYDROMORPHONE HYDROCHLORIDE 0.5 MILLIGRAM(S): 2 INJECTION INTRAMUSCULAR; INTRAVENOUS; SUBCUTANEOUS at 03:17

## 2021-03-13 RX ADMIN — HYDROMORPHONE HYDROCHLORIDE 0.5 MILLIGRAM(S): 2 INJECTION INTRAMUSCULAR; INTRAVENOUS; SUBCUTANEOUS at 10:45

## 2021-03-13 RX ADMIN — Medication 12.5 MILLIGRAM(S): at 13:21

## 2021-03-13 RX ADMIN — ATORVASTATIN CALCIUM 40 MILLIGRAM(S): 80 TABLET, FILM COATED ORAL at 22:41

## 2021-03-13 RX ADMIN — Medication 1000 MILLIGRAM(S): at 05:57

## 2021-03-13 RX ADMIN — FENTANYL CITRATE 50 MICROGRAM(S): 50 INJECTION INTRAVENOUS at 14:53

## 2021-03-13 RX ADMIN — Medication 400 MILLIGRAM(S): at 05:27

## 2021-03-13 RX ADMIN — HYDROMORPHONE HYDROCHLORIDE 0.5 MILLIGRAM(S): 2 INJECTION INTRAMUSCULAR; INTRAVENOUS; SUBCUTANEOUS at 03:47

## 2021-03-13 RX ADMIN — FENTANYL CITRATE 50 MICROGRAM(S): 50 INJECTION INTRAVENOUS at 14:38

## 2021-03-13 RX ADMIN — HYDROMORPHONE HYDROCHLORIDE 0.5 MILLIGRAM(S): 2 INJECTION INTRAMUSCULAR; INTRAVENOUS; SUBCUTANEOUS at 10:30

## 2021-03-13 RX ADMIN — MUPIROCIN 1 APPLICATION(S): 20 OINTMENT TOPICAL at 17:07

## 2021-03-13 RX ADMIN — HYDROMORPHONE HYDROCHLORIDE 0.5 MILLIGRAM(S): 2 INJECTION INTRAMUSCULAR; INTRAVENOUS; SUBCUTANEOUS at 09:22

## 2021-03-13 RX ADMIN — HYDROMORPHONE HYDROCHLORIDE 0.5 MILLIGRAM(S): 2 INJECTION INTRAMUSCULAR; INTRAVENOUS; SUBCUTANEOUS at 01:40

## 2021-03-13 RX ADMIN — Medication 125 MILLILITER(S): at 22:38

## 2021-03-13 RX ADMIN — Medication 100 MILLIGRAM(S): at 16:30

## 2021-03-13 RX ADMIN — Medication 100 MICROGRAM(S): at 05:00

## 2021-03-13 RX ADMIN — HYDROMORPHONE HYDROCHLORIDE 0.5 MILLIGRAM(S): 2 INJECTION INTRAMUSCULAR; INTRAVENOUS; SUBCUTANEOUS at 09:07

## 2021-03-13 RX ADMIN — HYDROMORPHONE HYDROCHLORIDE 0.5 MILLIGRAM(S): 2 INJECTION INTRAMUSCULAR; INTRAVENOUS; SUBCUTANEOUS at 16:35

## 2021-03-13 RX ADMIN — HYDROMORPHONE HYDROCHLORIDE 0.5 MILLIGRAM(S): 2 INJECTION INTRAMUSCULAR; INTRAVENOUS; SUBCUTANEOUS at 16:20

## 2021-03-13 RX ADMIN — HYDROMORPHONE HYDROCHLORIDE 1 MILLIGRAM(S): 2 INJECTION INTRAMUSCULAR; INTRAVENOUS; SUBCUTANEOUS at 20:45

## 2021-03-13 RX ADMIN — Medication 125 MILLILITER(S): at 22:47

## 2021-03-13 RX ADMIN — HYDROMORPHONE HYDROCHLORIDE 1 MILLIGRAM(S): 2 INJECTION INTRAMUSCULAR; INTRAVENOUS; SUBCUTANEOUS at 18:06

## 2021-03-13 RX ADMIN — HEPARIN SODIUM 5000 UNIT(S): 5000 INJECTION INTRAVENOUS; SUBCUTANEOUS at 22:41

## 2021-03-13 RX ADMIN — OXYCODONE AND ACETAMINOPHEN 2 TABLET(S): 5; 325 TABLET ORAL at 16:00

## 2021-03-13 NOTE — PROGRESS NOTE ADULT - SUBJECTIVE AND OBJECTIVE BOX
Cardiovascular Disease Progress Note    Overnight events: No acute events overnight.  s/p cabg. no new cardiac sx  Otherwise review of systems negative    Objective Findings:  T(C): 35.9 (21 @ 08:00), Max: 37.1 (21 @ 20:00)  HR: 71 (21 @ 09:00) (66 - 82)  BP: --  RR: 19 (21 @ 09:00) (12 - 32)  SpO2: 96% (21 @ 09:00) (88% - 100%)  Wt(kg): --  Daily     Daily Weight in k.9 (13 Mar 2021 00:00)      Physical Exam:  Gen: NAD  HEENT: EOMI  CV: RRR, normal S1 + S2, no m/r/g. sternal incision c/d/i  Lungs: CTAB. drains in place  Abd: soft, non-tender  Ext: No edema    Telemetry: nsr    Laboratory Data:                        11.2   10.75 )-----------( 106      ( 13 Mar 2021 00:11 )             34.2     03-13    142  |  110<H>  |  18  ----------------------------<  94  4.2   |  23  |  0.98    Ca    8.8      13 Mar 2021 00:11  Phos  3.5     03-13  Mg     2.4     -13    TPro  5.8<L>  /  Alb  3.7  /  TBili  0.6  /  DBili  x   /  AST  53<H>  /  ALT  22  /  AlkPhos  66  03-13    PT/INR - ( 13 Mar 2021 00:11 )   PT: 13.9 sec;   INR: 1.17 ratio         PTT - ( 13 Mar 2021 00:11 )  PTT:28.6 sec  CARDIAC MARKERS ( 12 Mar 2021 14:08 )  x     / x     / 576 U/L / x     / 62.2 ng/mL          Inpatient Medications:  MEDICATIONS  (STANDING):  aspirin enteric coated 81 milliGRAM(s) Oral daily  atorvastatin 40 milliGRAM(s) Oral at bedtime  cefuroxime  IVPB 1500 milliGRAM(s) IV Intermittent every 8 hours  chlorhexidine 2% Cloths 1 Application(s) Topical <User Schedule>  dexMEDEtomidine Infusion 0.3 MICROgram(s)/kG/Hr (6.64 mL/Hr) IV Continuous <Continuous>  famotidine Injectable 20 milliGRAM(s) IV Push every 12 hours  insulin regular Infusion 3 Unit(s)/Hr (3 mL/Hr) IV Continuous <Continuous>  latanoprost 0.005% Ophthalmic Solution 1 Drop(s) Both EYES at bedtime  levothyroxine 100 MICROGram(s) Oral daily  mupirocin 2% Ointment 1 Application(s) Topical two times a day  niCARdipine Infusion 5 mG/Hr (25 mL/Hr) IV Continuous <Continuous>  polyethylene glycol 3350 17 Gram(s) Oral daily  sodium chloride 0.9%. 1000 milliLiter(s) (10 mL/Hr) IV Continuous <Continuous>      Assessment:  -unstable angina  -mvd  -htn  -hld  -dm  -chronic pancreatitis    Recs:  s/p lhc with mvd, s/p cabg with dr martinez  iabp pending removal  cw asa and statin  bp control per cts  care per cts        Over 25 minutes spent on total encounter; more than 50% of the visit was spent counseling and/or coordinating care by the attending physician.      Alexei Fraga MD   Cardiovascular Disease  [phone]

## 2021-03-13 NOTE — PHYSICAL THERAPY INITIAL EVALUATION ADULT - TRANSFER TRAINING, PT EVAL
GOAL: Pt will perform sit to/from stand transfers independently with/without AD as needed within 1-2 weeks

## 2021-03-13 NOTE — PROGRESS NOTE ADULT - SUBJECTIVE AND OBJECTIVE BOX
SELENE BE  MRN-[id number]  Patient is a 65y old  Male who presents with a chief complaint of chest pain (12 Mar 2021 17:44)    HPI:  65M with PMH of HTN, T2DM, HLD, hypothyroidism, chronic pancreatitis p/w chest pain. Pt states he has been having CP for past 2 weeks - pain was initially only with exertion, located midsternal/epigastric region, burning pain with occasional radiation to the jaw and associated with DUARTE. Pain would improve after about 5 minutes of rest. However, in past few days pt has started having pain even at rest. Denies any associated nausea/vomiting, diaphoresis, palpitations, syncope/lightheadedness. Pt went to see cardiologist for these symptoms and TTE showed inferolateral wall hypokinesis and referred to ED for possible cath. Pt had a normal stress test 2 years ago for pore-op for RLE arthrocentesis. ROS positive for chronic abd pain 2/2 chronic pancreatitis. Prior smoker, quit 1992.     Pt does not know home medications.  (09 Mar 2021 02:17)      Surgery/Hospital course:  3/12 C3L, preop IABP     REVIEW OF SYSTEMS:  Gen: No fever  EYES/ENT: No visual changes;  No vertigo or throat pain   NECK: No pain   RES:  No shortness of breath or Cough  CARD: No chest pain   GI: No abdominal pain  : No dysuria  NEURO: No weakness  SKIN: No itching, rashes     Vital Signs Last 24 Hrs  T(C): 36.4 (13 Mar 2021 04:00), Max: 37.1 (12 Mar 2021 20:00)  T(F): 97.6 (13 Mar 2021 04:00), Max: 98.7 (12 Mar 2021 20:00)  HR: 72 (13 Mar 2021 07:00) (66 - 82)  BP: --  BP(mean): --  RR: 24 (13 Mar 2021 07:00) (12 - 32)  SpO2: 96% (13 Mar 2021 07:00) (88% - 100%)    ============================I/O===========================   I&O's Detail    12 Mar 2021 07:01  -  13 Mar 2021 07:00  --------------------------------------------------------  IN:    Albumin 5%  - 250 mL: 500 mL    Dexmedetomidine: 150.4 mL    Insulin: 49 mL    IV PiggyBack: 300 mL    NiCARdipine: 45 mL    sodium chloride 0.9%: 180 mL  Total IN: 1224.4 mL    OUT:    Chest Tube (mL): 0 mL    Chest Tube (mL): 0 mL    Chest Tube (mL): 210 mL    Indwelling Catheter - Urethral (mL): 1295 mL  Total OUT: 1505 mL    Total NET: -280.6 mL        ============================ LABS =========================                        11.2   10.75 )-----------( 106      ( 13 Mar 2021 00:11 )             34.2     03-13    142  |  110<H>  |  18  ----------------------------<  94  4.2   |  23  |  0.98    Ca    8.8      13 Mar 2021 00:11  Phos  3.5     03-13  Mg     2.4     03-13    TPro  5.8<L>  /  Alb  3.7  /  TBili  0.6  /  DBili  x   /  AST  53<H>  /  ALT  22  /  AlkPhos  66  03-13    LIVER FUNCTIONS - ( 13 Mar 2021 00:11 )  Alb: 3.7 g/dL / Pro: 5.8 g/dL / ALK PHOS: 66 U/L / ALT: 22 U/L / AST: 53 U/L / GGT: x           PT/INR - ( 13 Mar 2021 00:11 )   PT: 13.9 sec;   INR: 1.17 ratio         PTT - ( 13 Mar 2021 00:11 )  PTT:28.6 sec  ABG - ( 13 Mar 2021 06:28 )  pH, Arterial: 7.39  pH, Blood: x     /  pCO2: 38    /  pO2: 69    / HCO3: 22    / Base Excess: -1.8  /  SaO2: 95                  ======================Microbiology/Radadiology=================  Echo: Reviewed   CXR: Reviewed  ======================================================  PAST MEDICAL & SURGICAL HISTORY:  Chronic pancreatitis    Hypothyroidism    HLD (hyperlipidemia)    HTN (hypertension)    DM (diabetes mellitus)    No significant past surgical history      ====================ASSESSMENT ==============  S/P C3L pre-op IABP   Hypovolemia   Post op respiratory insufficiency increased A-a gradient  Diabetes mellitus type 2   HLD   HTN  Acquired hypothyroidism     Plan:  ====================== NEUROLOGY=====================  Sedated with IV Precedex post extubation, wean as tolerated to off     dexMEDEtomidine Infusion 0.3 MICROgram(s)/kG/Hr (6.64 mL/Hr) IV Continuous <Continuous>    ==================== RESPIRATORY======================  Extubated overnight, supplemental O2 via Aerosol Mask   Encourage incentive spirometry, continue pulse ox monitoring, follow ABGs     ====================CARDIOVASCULAR==================  CAD s/p C3L , preop IABP   L FA IABP with good diastolic augmentation    Continue invasive hemodynamic monitoring   Blood pressure support with IV Cardene   Back up pacing wires in place   ASA/Lipitor for graft patency     aspirin enteric coated 81 milliGRAM(s) Oral daily  atorvastatin 40 milliGRAM(s) Oral at bedtime  niCARdipine Infusion 5 mG/Hr (25 mL/Hr) IV Continuous <Continuous>    ===================HEMATOLOGIC/ONC ===================  Anemia and thrombocytopenia, monitor H&H/Plts     ===================== RENAL =========================  Continue monitoring urine output, I&Os, Bun/Cr  Target net negative fluid balance   Monitor and replete lytes prn     ==================== GASTROINTESTINAL===================  Tolerating regular consistent carb diet   Bowel regimen with Miralax     GI prophylaxis, famotidine Injectable 20 milliGRAM(s) IV Push every 12 hours  polyethylene glycol 3350 17 Gram(s) Oral daily  sodium chloride 0.9%. 1000 milliLiter(s) (10 mL/Hr) IV Continuous <Continuous>    =======================    ENDOCRINE  =====================  Hx of DM2, continue glucose control with IV insulin   Synthroid for hypothyroidism     insulin regular Infusion 3 Unit(s)/Hr (3 mL/Hr) IV Continuous <Continuous>  levothyroxine 100 MICROGram(s) Oral daily    ========================INFECTIOUS DISEASE================  Perioperative coverage with cefuroxime     cefuroxime  IVPB 1500 milliGRAM(s) IV Intermittent every 8 hours      TIME:     Patient requires continuous monitoring with bedside rhythm monitoring, pulse ox monitoring, and intermittent blood gas analysis. Care plan discussed with ICU care team. Patient remained critical and at risk for life threatening decompensation.    By signing my name below, Candace SHAIKH, attest that this documentation has been prepared under the direction and in the presence of Raghavendra Santamaria MD   Electronically signed: Alonso Stevenson Matthew Pierce, personally performed the services described in this documentation. all medical record entries made by the scribe were at my direction and in my presence. I have reviewed the chart and agree that the record reflects my personal performance and is accurate and complete  Electronically signed: Raghavendra Santamaria MD 03-13-21 @ 07:56       SELENE BE  MRN-[id number]  Patient is a 65y old  Male who presents with a chief complaint of chest pain (12 Mar 2021 17:44)    HPI:  65M with PMH of HTN, T2DM, HLD, hypothyroidism, chronic pancreatitis p/w chest pain. Pt states he has been having CP for past 2 weeks - pain was initially only with exertion, located midsternal/epigastric region, burning pain with occasional radiation to the jaw and associated with DUARTE. Pain would improve after about 5 minutes of rest. However, in past few days pt has started having pain even at rest. Denies any associated nausea/vomiting, diaphoresis, palpitations, syncope/lightheadedness. Pt went to see cardiologist for these symptoms and TTE showed inferolateral wall hypokinesis and referred to ED for possible cath. Pt had a normal stress test 2 years ago for pore-op for RLE arthrocentesis. ROS positive for chronic abd pain 2/2 chronic pancreatitis. Prior smoker, quit 1992.     Pt does not know home medications.  (09 Mar 2021 02:17)      Surgery/Hospital course:  3/12 C3L, preop IABP   3/13 IABP removed     REVIEW OF SYSTEMS:  Gen: No fever  EYES/ENT: No visual changes;  No vertigo or throat pain   NECK: No pain   RES:  No shortness of breath or Cough  CARD: No chest pain   GI: No abdominal pain  : No dysuria  NEURO: No weakness  SKIN: No itching, rashes     Vital Signs Last 24 Hrs  T(C): 36.4 (13 Mar 2021 04:00), Max: 37.1 (12 Mar 2021 20:00)  T(F): 97.6 (13 Mar 2021 04:00), Max: 98.7 (12 Mar 2021 20:00)  HR: 72 (13 Mar 2021 07:00) (66 - 82)  BP: --  BP(mean): --  RR: 24 (13 Mar 2021 07:00) (12 - 32)  SpO2: 96% (13 Mar 2021 07:00) (88% - 100%)    ============================I/O===========================   I&O's Detail    12 Mar 2021 07:01  -  13 Mar 2021 07:00  --------------------------------------------------------  IN:    Albumin 5%  - 250 mL: 500 mL    Dexmedetomidine: 150.4 mL    Insulin: 49 mL    IV PiggyBack: 300 mL    NiCARdipine: 45 mL    sodium chloride 0.9%: 180 mL  Total IN: 1224.4 mL    OUT:    Chest Tube (mL): 0 mL    Chest Tube (mL): 0 mL    Chest Tube (mL): 210 mL    Indwelling Catheter - Urethral (mL): 1295 mL  Total OUT: 1505 mL    Total NET: -280.6 mL        ============================ LABS =========================                        11.2   10.75 )-----------( 106      ( 13 Mar 2021 00:11 )             34.2     03-13    142  |  110<H>  |  18  ----------------------------<  94  4.2   |  23  |  0.98    Ca    8.8      13 Mar 2021 00:11  Phos  3.5     03-13  Mg     2.4     03-13    TPro  5.8<L>  /  Alb  3.7  /  TBili  0.6  /  DBili  x   /  AST  53<H>  /  ALT  22  /  AlkPhos  66  03-13    LIVER FUNCTIONS - ( 13 Mar 2021 00:11 )  Alb: 3.7 g/dL / Pro: 5.8 g/dL / ALK PHOS: 66 U/L / ALT: 22 U/L / AST: 53 U/L / GGT: x           PT/INR - ( 13 Mar 2021 00:11 )   PT: 13.9 sec;   INR: 1.17 ratio         PTT - ( 13 Mar 2021 00:11 )  PTT:28.6 sec  ABG - ( 13 Mar 2021 06:28 )  pH, Arterial: 7.39  pH, Blood: x     /  pCO2: 38    /  pO2: 69    / HCO3: 22    / Base Excess: -1.8  /  SaO2: 95                  ======================Microbiology/Radadiology=================  Echo: Reviewed   CXR: Reviewed  ======================================================  PAST MEDICAL & SURGICAL HISTORY:  Chronic pancreatitis    Hypothyroidism    HLD (hyperlipidemia)    HTN (hypertension)    DM (diabetes mellitus)    No significant past surgical history      ====================ASSESSMENT ==============  S/P C3L pre-op IABP   Hypovolemia   Post op respiratory insufficiency increased A-a gradient  Diabetes mellitus type 2   HLD   HTN  Acquired hypothyroidism     Plan:  ====================== NEUROLOGY=====================  Sedated with IV Precedex post extubation, wean as tolerated to off     dexMEDEtomidine Infusion 0.3 MICROgram(s)/kG/Hr (6.64 mL/Hr) IV Continuous <Continuous>    ==================== RESPIRATORY======================  Extubated overnight, supplemental O2 via Aerosol Mask   Encourage incentive spirometry, continue pulse ox monitoring, follow ABGs     ====================CARDIOVASCULAR==================  CAD s/p C3L , preop IABP   L FA IABP weaned this AM s/p removal today   Continue invasive hemodynamic monitoring   Blood pressure support with IV Cardene x 2hours this AM, now off   Plan to start betablocker for afib prophylaxis   Back up pacing wires in place   ASA/Lipitor for graft patency     aspirin enteric coated 81 milliGRAM(s) Oral daily  atorvastatin 40 milliGRAM(s) Oral at bedtime  niCARdipine Infusion 5 mG/Hr (25 mL/Hr) IV Continuous <Continuous>    ===================HEMATOLOGIC/ONC ===================  Anemia and thrombocytopenia, monitor H&H/Plts     ===================== RENAL =========================  Continue monitoring urine output, I&Os, Bun/Cr  Low urine output s/p Albumin x1, with improvement   Target net negative fluid balance   Monitor and replete lytes prn     ==================== GASTROINTESTINAL===================  Tolerating regular consistent carb diet   Bowel regimen with Miralax     GI prophylaxis, famotidine Injectable 20 milliGRAM(s) IV Push every 12 hours  polyethylene glycol 3350 17 Gram(s) Oral daily  sodium chloride 0.9%. 1000 milliLiter(s) (10 mL/Hr) IV Continuous <Continuous>    =======================    ENDOCRINE  =====================  Hx of DM2, continue glucose control with IV insulin   Synthroid for hypothyroidism     insulin regular Infusion 3 Unit(s)/Hr (3 mL/Hr) IV Continuous <Continuous>  levothyroxine 100 MICROGram(s) Oral daily    ========================INFECTIOUS DISEASE================  Perioperative coverage with cefuroxime     cefuroxime  IVPB 1500 milliGRAM(s) IV Intermittent every 8 hours      TIME:     Patient requires continuous monitoring with bedside rhythm monitoring, pulse ox monitoring, and intermittent blood gas analysis. Care plan discussed with ICU care team. Patient remained critical and at risk for life threatening decompensation.    By signing my name below, I, Candace Marrero, attest that this documentation has been prepared under the direction and in the presence of Raghavendra Santamaria MD   Electronically signed: Alonso Stevenson    I, Raghavendra Santamaria, personally performed the services described in this documentation. all medical record entries made by the scribe were at my direction and in my presence. I have reviewed the chart and agree that the record reflects my personal performance and is accurate and complete  Electronically signed: Raghavendra Santamaria MD 03-13-21 @ 07:56       SELENE BE  MRN-[id number]  Patient is a 65y old  Male who presents with a chief complaint of chest pain (12 Mar 2021 17:44)    HPI:  65M with PMH of HTN, T2DM, HLD, hypothyroidism, chronic pancreatitis p/w chest pain. Pt states he has been having CP for past 2 weeks - pain was initially only with exertion, located midsternal/epigastric region, burning pain with occasional radiation to the jaw and associated with DUARTE. Pain would improve after about 5 minutes of rest. However, in past few days pt has started having pain even at rest. Denies any associated nausea/vomiting, diaphoresis, palpitations, syncope/lightheadedness. Pt went to see cardiologist for these symptoms and TTE showed inferolateral wall hypokinesis and referred to ED for possible cath. Pt had a normal stress test 2 years ago for pore-op for RLE arthrocentesis. ROS positive for chronic abd pain 2/2 chronic pancreatitis. Prior smoker, quit 1992.     Pt does not know home medications.  (09 Mar 2021 02:17)      Surgery/Hospital course:  3/12 C3L, preop IABP   3/13 IABP removed     REVIEW OF SYSTEMS:  Gen: No fever  EYES/ENT: No visual changes;  No vertigo or throat pain   NECK: No pain   RES:  No shortness of breath or Cough  CARD: No chest pain   GI: No abdominal pain  : No dysuria  NEURO: No weakness  SKIN: No itching, rashes     Vital Signs Last 24 Hrs  T(C): 36.4 (13 Mar 2021 04:00), Max: 37.1 (12 Mar 2021 20:00)  T(F): 97.6 (13 Mar 2021 04:00), Max: 98.7 (12 Mar 2021 20:00)  HR: 72 (13 Mar 2021 07:00) (66 - 82)  BP: --  BP(mean): --  RR: 24 (13 Mar 2021 07:00) (12 - 32)  SpO2: 96% (13 Mar 2021 07:00) (88% - 100%)    ============================I/O===========================   I&O's Detail    12 Mar 2021 07:01  -  13 Mar 2021 07:00  --------------------------------------------------------  IN:    Albumin 5%  - 250 mL: 500 mL    Dexmedetomidine: 150.4 mL    Insulin: 49 mL    IV PiggyBack: 300 mL    NiCARdipine: 45 mL    sodium chloride 0.9%: 180 mL  Total IN: 1224.4 mL    OUT:    Chest Tube (mL): 0 mL    Chest Tube (mL): 0 mL    Chest Tube (mL): 210 mL    Indwelling Catheter - Urethral (mL): 1295 mL  Total OUT: 1505 mL    Total NET: -280.6 mL        ============================ LABS =========================                        11.2   10.75 )-----------( 106      ( 13 Mar 2021 00:11 )             34.2     03-13    142  |  110<H>  |  18  ----------------------------<  94  4.2   |  23  |  0.98    Ca    8.8      13 Mar 2021 00:11  Phos  3.5     03-13  Mg     2.4     03-13    TPro  5.8<L>  /  Alb  3.7  /  TBili  0.6  /  DBili  x   /  AST  53<H>  /  ALT  22  /  AlkPhos  66  03-13    LIVER FUNCTIONS - ( 13 Mar 2021 00:11 )  Alb: 3.7 g/dL / Pro: 5.8 g/dL / ALK PHOS: 66 U/L / ALT: 22 U/L / AST: 53 U/L / GGT: x           PT/INR - ( 13 Mar 2021 00:11 )   PT: 13.9 sec;   INR: 1.17 ratio         PTT - ( 13 Mar 2021 00:11 )  PTT:28.6 sec  ABG - ( 13 Mar 2021 06:28 )  pH, Arterial: 7.39  pH, Blood: x     /  pCO2: 38    /  pO2: 69    / HCO3: 22    / Base Excess: -1.8  /  SaO2: 95                  ======================Microbiology/Radadiology=================  Echo: Reviewed   CXR: Reviewed  ======================================================  PAST MEDICAL & SURGICAL HISTORY:  Chronic pancreatitis    Hypothyroidism    HLD (hyperlipidemia)    HTN (hypertension)    DM (diabetes mellitus)    No significant past surgical history      ====================ASSESSMENT ==============  S/P C3L pre-op IABP   Hypovolemia   Post op respiratory insufficiency increased A-a gradient  Diabetes mellitus type 2   HLD   HTN  Acquired hypothyroidism     Plan:  ====================== NEUROLOGY=====================  Sedated with IV Precedex post extubation, wean as tolerated to off     dexMEDEtomidine Infusion 0.3 MICROgram(s)/kG/Hr (6.64 mL/Hr) IV Continuous <Continuous>    ==================== RESPIRATORY======================  Extubated overnight, supplemental O2 via Aerosol Mask   Encourage incentive spirometry, continue pulse ox monitoring, follow ABGs     ====================CARDIOVASCULAR==================  CAD s/p C3L , preop IABP   L FA IABP weaned this AM s/p removal today   Continue invasive hemodynamic monitoring   Blood pressure support with IV Cardene x 2hours this AM, now off   Plan to start betablocker for afib prophylaxis   Back up pacing wires in place   ASA/Lipitor for graft patency     aspirin enteric coated 81 milliGRAM(s) Oral daily  atorvastatin 40 milliGRAM(s) Oral at bedtime  niCARdipine Infusion 5 mG/Hr (25 mL/Hr) IV Continuous <Continuous>    ===================HEMATOLOGIC/ONC ===================  Anemia and thrombocytopenia, monitor H&H/Plts     ===================== RENAL =========================  Continue monitoring urine output, I&Os, Bun/Cr  Low urine output s/p Albumin x1, with improvement   Target net negative fluid balance   Monitor and replete lytes prn     ==================== GASTROINTESTINAL===================  Tolerating regular consistent carb diet   Bowel regimen with Miralax     GI prophylaxis, famotidine Injectable 20 milliGRAM(s) IV Push every 12 hours  polyethylene glycol 3350 17 Gram(s) Oral daily  sodium chloride 0.9%. 1000 milliLiter(s) (10 mL/Hr) IV Continuous <Continuous>    =======================    ENDOCRINE  =====================  Hx of DM2, continue glucose control with IV insulin   Synthroid for hypothyroidism     insulin regular Infusion 3 Unit(s)/Hr (3 mL/Hr) IV Continuous <Continuous>  levothyroxine 100 MICROGram(s) Oral daily    ========================INFECTIOUS DISEASE================  Perioperative coverage with cefuroxime     cefuroxime  IVPB 1500 milliGRAM(s) IV Intermittent every 8 hours    TIME: 35 minutes    Patient requires continuous monitoring with bedside rhythm monitoring, pulse ox monitoring, and intermittent blood gas analysis. Care plan discussed with ICU care team. Patient remained critical and at risk for life threatening decompensation.    By signing my name below, I, Candace Marrero, attest that this documentation has been prepared under the direction and in the presence of Raghavendra Santamaria MD   Electronically signed: Alonso Stevenson    I, Raghavendra Santamaria, personally performed the services described in this documentation. all medical record entries made by the scribe were at my direction and in my presence. I have reviewed the chart and agree that the record reflects my personal performance and is accurate and complete  Electronically signed: Raghavendra Santamaria MD 03-13-21 @ 07:56

## 2021-03-13 NOTE — PHYSICAL THERAPY INITIAL EVALUATION ADULT - ADDITIONAL COMMENTS
Pt lives w/ his spouse and son in a pvt home w/ 9 HANSEL, +BHR, pt was independent w/ all ADLs and functional mobility at baseline.

## 2021-03-13 NOTE — PROGRESS NOTE ADULT - SUBJECTIVE AND OBJECTIVE BOX
Patient is a 65y old  Male who presents with a chief complaint of chest pain (13 Mar 2021 09:49)      SUBJECTIVE / OVERNIGHT EVENTS: c/o incisional discomfort  Review of Systems  chest pain no  palpitations no  sob no  nausea no  headache no    MEDICATIONS  (STANDING):  aspirin enteric coated 81 milliGRAM(s) Oral daily  atorvastatin 40 milliGRAM(s) Oral at bedtime  cefuroxime  IVPB 1500 milliGRAM(s) IV Intermittent every 8 hours  chlorhexidine 2% Cloths 1 Application(s) Topical <User Schedule>  famotidine    Tablet 20 milliGRAM(s) Oral daily  heparin   Injectable 5000 Unit(s) SubCutaneous every 8 hours  insulin regular Infusion 3 Unit(s)/Hr (3 mL/Hr) IV Continuous <Continuous>  latanoprost 0.005% Ophthalmic Solution 1 Drop(s) Both EYES at bedtime  levothyroxine 100 MICROGram(s) Oral daily  metoprolol tartrate 25 milliGRAM(s) Oral every 8 hours  mupirocin 2% Ointment 1 Application(s) Topical two times a day  niCARdipine Infusion 5 mG/Hr (25 mL/Hr) IV Continuous <Continuous>  polyethylene glycol 3350 17 Gram(s) Oral daily  sodium chloride 0.9%. 1000 milliLiter(s) (10 mL/Hr) IV Continuous <Continuous>    MEDICATIONS  (PRN):  BACItracin   Ointment 1 Application(s) Topical every 12 hours PRN Pain/itching  oxycodone    5 mG/acetaminophen 325 mG 1 Tablet(s) Oral every 4 hours PRN Moderate Pain (4 - 6)  oxycodone    5 mG/acetaminophen 325 mG 2 Tablet(s) Oral every 4 hours PRN Severe Pain (7 - 10)      Vital Signs Last 24 Hrs  T(C): 36.3 (13 Mar 2021 16:00), Max: 37.1 (12 Mar 2021 20:00)  T(F): 97.3 (13 Mar 2021 16:00), Max: 98.7 (12 Mar 2021 20:00)  HR: 96 (13 Mar 2021 18:00) (66 - 96)  BP: 180/78 (13 Mar 2021 16:00) (180/78 - 180/78)  BP(mean): 112 (13 Mar 2021 16:00) (112 - 112)  RR: 27 (13 Mar 2021 18:00) (12 - 32)  SpO2: 98% (13 Mar 2021 18:00) (88% - 100%)    PHYSICAL EXAM:  GENERAL: NAD, well-developed  HEAD:  Atraumatic, Normocephalic  EYES: EOMI, PERRLA, conjunctiva and sclera clear  NECK: Supple, No JVD  CHEST/LUNG: Clear to auscultation bilaterally; No wheeze CT to seal  HEART: Regular rate and rhythm; No murmurs, rubs, or gallops  ABDOMEN: Soft, Nontender, Nondistended; Bowel sounds present  EXTREMITIES:  2+ Peripheral Pulses, No clubbing, cyanosis, or edema  PSYCH: AAOx3  NEUROLOGY: non-focal  SKIN: No rashes or lesions    LABS:                        11.2   10.75 )-----------( 106      ( 13 Mar 2021 00:11 )             34.2     03-13    142  |  110<H>  |  18  ----------------------------<  94  4.2   |  23  |  0.98    Ca    8.8      13 Mar 2021 00:11  Phos  3.5     03-13  Mg     2.4     03-13    TPro  5.8<L>  /  Alb  3.7  /  TBili  0.6  /  DBili  x   /  AST  53<H>  /  ALT  22  /  AlkPhos  66  03-13    PT/INR - ( 13 Mar 2021 00:11 )   PT: 13.9 sec;   INR: 1.17 ratio         PTT - ( 13 Mar 2021 00:11 )  PTT:28.6 sec  CARDIAC MARKERS ( 12 Mar 2021 14:08 )  x     / x     / 576 U/L / x     / 62.2 ng/mL            RADIOLOGY & ADDITIONAL TESTS:    Imaging Personally Reviewed:    Consultant(s) Notes Reviewed:      Care Discussed with Consultants/Other Providers:

## 2021-03-13 NOTE — PROGRESS NOTE ADULT - ASSESSMENT
65M with PMH of HTN, T2DM, HLD, hypothyroidism, chronic pancreatitis p/w chest pain x 2 weeks, found with inferolateral wall hypokinesis with inferior TWI on EKG as outpt, admitted for c/f ACS.     CAD/  Unstable angina.   - s/p CABGX3  - postop care    Essential hypertension.   - control    Type 2 diabetes mellitus with hyperglycemia, with long-term current use of insulin.  - ADA diet  - BS control    Hypothyroidism.    - continue Synthroid  - follow TSH.    Chronic pancreatitis.   - chronic abd pain, lipase mildly elevated  - c/t monitor  - c/w pain control.    SBO partial resolved  - surgical eval. No intervention.     HLD (hyperlipidemia).   - high intensity statin.    CTU care     Sriram Flores MD pager [phone]

## 2021-03-13 NOTE — PHYSICAL THERAPY INITIAL EVALUATION ADULT - PERTINENT HX OF CURRENT PROBLEM, REHAB EVAL
65M with PMH of HTN, T2DM, HLD, hypothyroidism, chronic pancreatitis p/w chest pain. Pt states he has been having CP for past 2 weeks - pain was initially only with exertion, located midsternal/epigastric region, burning pain with occasional radiation to the jaw and associated with DUARTE. Now s/p C3L on 3/12/21 w/ MD Perkins.

## 2021-03-14 DIAGNOSIS — E03.9 HYPOTHYROIDISM, UNSPECIFIED: ICD-10-CM

## 2021-03-14 DIAGNOSIS — I10 ESSENTIAL (PRIMARY) HYPERTENSION: ICD-10-CM

## 2021-03-14 DIAGNOSIS — E78.5 HYPERLIPIDEMIA, UNSPECIFIED: ICD-10-CM

## 2021-03-14 LAB
ALBUMIN SERPL ELPH-MCNC: 3.9 G/DL — SIGNIFICANT CHANGE UP (ref 3.3–5)
ALP SERPL-CCNC: 55 U/L — SIGNIFICANT CHANGE UP (ref 40–120)
ALT FLD-CCNC: 18 U/L — SIGNIFICANT CHANGE UP (ref 10–45)
ANION GAP SERPL CALC-SCNC: 11 MMOL/L — SIGNIFICANT CHANGE UP (ref 5–17)
AST SERPL-CCNC: 28 U/L — SIGNIFICANT CHANGE UP (ref 10–40)
BILIRUB SERPL-MCNC: 0.6 MG/DL — SIGNIFICANT CHANGE UP (ref 0.2–1.2)
BUN SERPL-MCNC: 26 MG/DL — HIGH (ref 7–23)
CALCIUM SERPL-MCNC: 8.6 MG/DL — SIGNIFICANT CHANGE UP (ref 8.4–10.5)
CHLORIDE SERPL-SCNC: 106 MMOL/L — SIGNIFICANT CHANGE UP (ref 96–108)
CO2 SERPL-SCNC: 22 MMOL/L — SIGNIFICANT CHANGE UP (ref 22–31)
CREAT SERPL-MCNC: 1.06 MG/DL — SIGNIFICANT CHANGE UP (ref 0.5–1.3)
GAS PNL BLDA: SIGNIFICANT CHANGE UP
GLUCOSE BLDC GLUCOMTR-MCNC: 101 MG/DL — HIGH (ref 70–99)
GLUCOSE BLDC GLUCOMTR-MCNC: 104 MG/DL — HIGH (ref 70–99)
GLUCOSE BLDC GLUCOMTR-MCNC: 107 MG/DL — HIGH (ref 70–99)
GLUCOSE BLDC GLUCOMTR-MCNC: 110 MG/DL — HIGH (ref 70–99)
GLUCOSE BLDC GLUCOMTR-MCNC: 118 MG/DL — HIGH (ref 70–99)
GLUCOSE BLDC GLUCOMTR-MCNC: 135 MG/DL — HIGH (ref 70–99)
GLUCOSE BLDC GLUCOMTR-MCNC: 139 MG/DL — HIGH (ref 70–99)
GLUCOSE BLDC GLUCOMTR-MCNC: 145 MG/DL — HIGH (ref 70–99)
GLUCOSE BLDC GLUCOMTR-MCNC: 145 MG/DL — HIGH (ref 70–99)
GLUCOSE BLDC GLUCOMTR-MCNC: 147 MG/DL — HIGH (ref 70–99)
GLUCOSE BLDC GLUCOMTR-MCNC: 153 MG/DL — HIGH (ref 70–99)
GLUCOSE BLDC GLUCOMTR-MCNC: 161 MG/DL — HIGH (ref 70–99)
GLUCOSE BLDC GLUCOMTR-MCNC: 173 MG/DL — HIGH (ref 70–99)
GLUCOSE BLDC GLUCOMTR-MCNC: 177 MG/DL — HIGH (ref 70–99)
GLUCOSE BLDC GLUCOMTR-MCNC: 178 MG/DL — HIGH (ref 70–99)
GLUCOSE BLDC GLUCOMTR-MCNC: 195 MG/DL — HIGH (ref 70–99)
GLUCOSE BLDC GLUCOMTR-MCNC: 242 MG/DL — HIGH (ref 70–99)
GLUCOSE SERPL-MCNC: 136 MG/DL — HIGH (ref 70–99)
HCT VFR BLD CALC: 28.6 % — LOW (ref 39–50)
HGB BLD-MCNC: 9.4 G/DL — LOW (ref 13–17)
MAGNESIUM SERPL-MCNC: 2.2 MG/DL — SIGNIFICANT CHANGE UP (ref 1.6–2.6)
MCHC RBC-ENTMCNC: 27.8 PG — SIGNIFICANT CHANGE UP (ref 27–34)
MCHC RBC-ENTMCNC: 32.9 GM/DL — SIGNIFICANT CHANGE UP (ref 32–36)
MCV RBC AUTO: 84.6 FL — SIGNIFICANT CHANGE UP (ref 80–100)
NRBC # BLD: 0 /100 WBCS — SIGNIFICANT CHANGE UP (ref 0–0)
PHOSPHATE SERPL-MCNC: 2.7 MG/DL — SIGNIFICANT CHANGE UP (ref 2.5–4.5)
PLATELET # BLD AUTO: 126 K/UL — LOW (ref 150–400)
POTASSIUM SERPL-MCNC: 4.2 MMOL/L — SIGNIFICANT CHANGE UP (ref 3.5–5.3)
POTASSIUM SERPL-SCNC: 4.2 MMOL/L — SIGNIFICANT CHANGE UP (ref 3.5–5.3)
PROT SERPL-MCNC: 6 G/DL — SIGNIFICANT CHANGE UP (ref 6–8.3)
RBC # BLD: 3.38 M/UL — LOW (ref 4.2–5.8)
RBC # FLD: 14.1 % — SIGNIFICANT CHANGE UP (ref 10.3–14.5)
SARS-COV-2 RNA SPEC QL NAA+PROBE: SIGNIFICANT CHANGE UP
SODIUM SERPL-SCNC: 139 MMOL/L — SIGNIFICANT CHANGE UP (ref 135–145)
WBC # BLD: 15.96 K/UL — HIGH (ref 3.8–10.5)
WBC # FLD AUTO: 15.96 K/UL — HIGH (ref 3.8–10.5)

## 2021-03-14 PROCEDURE — 71045 X-RAY EXAM CHEST 1 VIEW: CPT | Mod: 26,76

## 2021-03-14 PROCEDURE — 71045 X-RAY EXAM CHEST 1 VIEW: CPT | Mod: 26,77

## 2021-03-14 PROCEDURE — 99233 SBSQ HOSP IP/OBS HIGH 50: CPT

## 2021-03-14 PROCEDURE — 93010 ELECTROCARDIOGRAM REPORT: CPT

## 2021-03-14 RX ORDER — INSULIN GLARGINE 100 [IU]/ML
45 INJECTION, SOLUTION SUBCUTANEOUS AT BEDTIME
Refills: 0 | Status: DISCONTINUED | OUTPATIENT
Start: 2021-03-15 | End: 2021-03-17

## 2021-03-14 RX ORDER — ALBUMIN HUMAN 25 %
250 VIAL (ML) INTRAVENOUS
Refills: 0 | Status: COMPLETED | OUTPATIENT
Start: 2021-03-14 | End: 2021-03-14

## 2021-03-14 RX ORDER — INSULIN GLARGINE 100 [IU]/ML
40 INJECTION, SOLUTION SUBCUTANEOUS AT BEDTIME
Refills: 0 | Status: COMPLETED | OUTPATIENT
Start: 2021-03-14 | End: 2021-03-14

## 2021-03-14 RX ORDER — INSULIN GLARGINE 100 [IU]/ML
12 INJECTION, SOLUTION SUBCUTANEOUS EVERY MORNING
Refills: 0 | Status: DISCONTINUED | OUTPATIENT
Start: 2021-03-14 | End: 2021-03-14

## 2021-03-14 RX ORDER — INSULIN LISPRO 100/ML
VIAL (ML) SUBCUTANEOUS
Refills: 0 | Status: DISCONTINUED | OUTPATIENT
Start: 2021-03-14 | End: 2021-03-17

## 2021-03-14 RX ORDER — HYDROMORPHONE HYDROCHLORIDE 2 MG/ML
30 INJECTION INTRAMUSCULAR; INTRAVENOUS; SUBCUTANEOUS
Refills: 0 | Status: DISCONTINUED | OUTPATIENT
Start: 2021-03-14 | End: 2021-03-15

## 2021-03-14 RX ORDER — HYDROMORPHONE HYDROCHLORIDE 2 MG/ML
1 INJECTION INTRAMUSCULAR; INTRAVENOUS; SUBCUTANEOUS ONCE
Refills: 0 | Status: DISCONTINUED | OUTPATIENT
Start: 2021-03-14 | End: 2021-03-14

## 2021-03-14 RX ORDER — NALOXONE HYDROCHLORIDE 4 MG/.1ML
0.1 SPRAY NASAL
Refills: 0 | Status: DISCONTINUED | OUTPATIENT
Start: 2021-03-14 | End: 2021-03-17

## 2021-03-14 RX ORDER — FUROSEMIDE 40 MG
40 TABLET ORAL ONCE
Refills: 0 | Status: COMPLETED | OUTPATIENT
Start: 2021-03-14 | End: 2021-03-14

## 2021-03-14 RX ORDER — INSULIN LISPRO 100/ML
10 VIAL (ML) SUBCUTANEOUS
Refills: 0 | Status: DISCONTINUED | OUTPATIENT
Start: 2021-03-15 | End: 2021-03-17

## 2021-03-14 RX ORDER — HYDROMORPHONE HYDROCHLORIDE 2 MG/ML
0.5 INJECTION INTRAMUSCULAR; INTRAVENOUS; SUBCUTANEOUS
Refills: 0 | Status: DISCONTINUED | OUTPATIENT
Start: 2021-03-14 | End: 2021-03-15

## 2021-03-14 RX ORDER — ONDANSETRON 8 MG/1
4 TABLET, FILM COATED ORAL EVERY 6 HOURS
Refills: 0 | Status: DISCONTINUED | OUTPATIENT
Start: 2021-03-14 | End: 2021-03-17

## 2021-03-14 RX ORDER — INSULIN LISPRO 100/ML
VIAL (ML) SUBCUTANEOUS AT BEDTIME
Refills: 0 | Status: DISCONTINUED | OUTPATIENT
Start: 2021-03-14 | End: 2021-03-17

## 2021-03-14 RX ORDER — METOCLOPRAMIDE HCL 10 MG
10 TABLET ORAL EVERY 8 HOURS
Refills: 0 | Status: COMPLETED | OUTPATIENT
Start: 2021-03-14 | End: 2021-03-16

## 2021-03-14 RX ADMIN — OXYCODONE AND ACETAMINOPHEN 2 TABLET(S): 5; 325 TABLET ORAL at 01:46

## 2021-03-14 RX ADMIN — HYDROMORPHONE HYDROCHLORIDE 1 MILLIGRAM(S): 2 INJECTION INTRAMUSCULAR; INTRAVENOUS; SUBCUTANEOUS at 05:44

## 2021-03-14 RX ADMIN — Medication 100 MILLIGRAM(S): at 00:20

## 2021-03-14 RX ADMIN — Medication 125 MILLILITER(S): at 00:20

## 2021-03-14 RX ADMIN — OXYCODONE AND ACETAMINOPHEN 2 TABLET(S): 5; 325 TABLET ORAL at 02:16

## 2021-03-14 RX ADMIN — MUPIROCIN 1 APPLICATION(S): 20 OINTMENT TOPICAL at 05:48

## 2021-03-14 RX ADMIN — ATORVASTATIN CALCIUM 40 MILLIGRAM(S): 80 TABLET, FILM COATED ORAL at 21:16

## 2021-03-14 RX ADMIN — Medication 10 MILLIGRAM(S): at 13:58

## 2021-03-14 RX ADMIN — Medication 40 MILLIGRAM(S): at 04:15

## 2021-03-14 RX ADMIN — Medication 81 MILLIGRAM(S): at 11:57

## 2021-03-14 RX ADMIN — FAMOTIDINE 20 MILLIGRAM(S): 10 INJECTION INTRAVENOUS at 11:57

## 2021-03-14 RX ADMIN — Medication 100 MICROGRAM(S): at 05:22

## 2021-03-14 RX ADMIN — HEPARIN SODIUM 5000 UNIT(S): 5000 INJECTION INTRAVENOUS; SUBCUTANEOUS at 05:22

## 2021-03-14 RX ADMIN — Medication 125 MILLILITER(S): at 00:32

## 2021-03-14 RX ADMIN — Medication 25 MILLIGRAM(S): at 13:57

## 2021-03-14 RX ADMIN — HYDROMORPHONE HYDROCHLORIDE 1 MILLIGRAM(S): 2 INJECTION INTRAMUSCULAR; INTRAVENOUS; SUBCUTANEOUS at 11:00

## 2021-03-14 RX ADMIN — Medication 25 MILLIGRAM(S): at 05:22

## 2021-03-14 RX ADMIN — Medication 10 MILLIGRAM(S): at 21:16

## 2021-03-14 RX ADMIN — HYDROMORPHONE HYDROCHLORIDE 1 MILLIGRAM(S): 2 INJECTION INTRAMUSCULAR; INTRAVENOUS; SUBCUTANEOUS at 02:15

## 2021-03-14 RX ADMIN — LATANOPROST 1 DROP(S): 0.05 SOLUTION/ DROPS OPHTHALMIC; TOPICAL at 21:49

## 2021-03-14 RX ADMIN — CHLORHEXIDINE GLUCONATE 1 APPLICATION(S): 213 SOLUTION TOPICAL at 05:21

## 2021-03-14 RX ADMIN — Medication 25 MILLIGRAM(S): at 21:16

## 2021-03-14 RX ADMIN — HYDROMORPHONE HYDROCHLORIDE 30 MILLILITER(S): 2 INJECTION INTRAMUSCULAR; INTRAVENOUS; SUBCUTANEOUS at 18:00

## 2021-03-14 RX ADMIN — OXYCODONE AND ACETAMINOPHEN 2 TABLET(S): 5; 325 TABLET ORAL at 05:48

## 2021-03-14 RX ADMIN — INSULIN GLARGINE 12 UNIT(S): 100 INJECTION, SOLUTION SUBCUTANEOUS at 10:10

## 2021-03-14 RX ADMIN — INSULIN GLARGINE 40 UNIT(S): 100 INJECTION, SOLUTION SUBCUTANEOUS at 21:17

## 2021-03-14 RX ADMIN — HYDROMORPHONE HYDROCHLORIDE 1 MILLIGRAM(S): 2 INJECTION INTRAMUSCULAR; INTRAVENOUS; SUBCUTANEOUS at 06:14

## 2021-03-14 RX ADMIN — HEPARIN SODIUM 5000 UNIT(S): 5000 INJECTION INTRAVENOUS; SUBCUTANEOUS at 13:58

## 2021-03-14 RX ADMIN — HYDROMORPHONE HYDROCHLORIDE 1 MILLIGRAM(S): 2 INJECTION INTRAMUSCULAR; INTRAVENOUS; SUBCUTANEOUS at 10:45

## 2021-03-14 RX ADMIN — HYDROMORPHONE HYDROCHLORIDE 30 MILLILITER(S): 2 INJECTION INTRAMUSCULAR; INTRAVENOUS; SUBCUTANEOUS at 13:17

## 2021-03-14 RX ADMIN — POLYETHYLENE GLYCOL 3350 17 GRAM(S): 17 POWDER, FOR SOLUTION ORAL at 11:57

## 2021-03-14 RX ADMIN — OXYCODONE AND ACETAMINOPHEN 2 TABLET(S): 5; 325 TABLET ORAL at 06:18

## 2021-03-14 RX ADMIN — HYDROMORPHONE HYDROCHLORIDE 30 MILLILITER(S): 2 INJECTION INTRAMUSCULAR; INTRAVENOUS; SUBCUTANEOUS at 18:54

## 2021-03-14 RX ADMIN — MUPIROCIN 1 APPLICATION(S): 20 OINTMENT TOPICAL at 18:03

## 2021-03-14 RX ADMIN — HYDROMORPHONE HYDROCHLORIDE 1 MILLIGRAM(S): 2 INJECTION INTRAMUSCULAR; INTRAVENOUS; SUBCUTANEOUS at 01:45

## 2021-03-14 RX ADMIN — HEPARIN SODIUM 5000 UNIT(S): 5000 INJECTION INTRAVENOUS; SUBCUTANEOUS at 21:16

## 2021-03-14 NOTE — PROGRESS NOTE ADULT - SUBJECTIVE AND OBJECTIVE BOX
Patient is a 65y old  Male who presents with a chief complaint of chest pain (14 Mar 2021 06:08)      SUBJECTIVE / OVERNIGHT EVENTS: c/o incisional pain.  Review of Systems  chest pain no  palpitations no  sob no  nausea no  headache no    MEDICATIONS  (STANDING):  aspirin enteric coated 81 milliGRAM(s) Oral daily  atorvastatin 40 milliGRAM(s) Oral at bedtime  chlorhexidine 2% Cloths 1 Application(s) Topical <User Schedule>  famotidine    Tablet 20 milliGRAM(s) Oral daily  heparin   Injectable 5000 Unit(s) SubCutaneous every 8 hours  HYDROmorphone PCA (1 mG/mL) 30 milliLiter(s) PCA Continuous PCA Continuous  insulin glargine Injectable (LANTUS) 12 Unit(s) SubCutaneous every morning  insulin regular Infusion 3 Unit(s)/Hr (3 mL/Hr) IV Continuous <Continuous>  latanoprost 0.005% Ophthalmic Solution 1 Drop(s) Both EYES at bedtime  levothyroxine 100 MICROGram(s) Oral daily  metoclopramide Injectable 10 milliGRAM(s) IV Push every 8 hours  metoprolol tartrate 25 milliGRAM(s) Oral every 8 hours  mupirocin 2% Ointment 1 Application(s) Topical two times a day  polyethylene glycol 3350 17 Gram(s) Oral daily    MEDICATIONS  (PRN):  BACItracin   Ointment 1 Application(s) Topical every 12 hours PRN Pain/itching  HYDROmorphone PCA (1 mG/mL) Rescue Clinician Bolus 0.5 milliGRAM(s) IV Push every 15 minutes PRN for Pain Scale GREATER THAN 6  naloxone Injectable 0.1 milliGRAM(s) IV Push every 3 minutes PRN For ANY of the following changes in patient status:  A. RR LESS THAN 10 breaths per minute, B. Oxygen saturation LESS THAN 90%, C. Sedation score of 6  ondansetron Injectable 4 milliGRAM(s) IV Push every 6 hours PRN Nausea  oxycodone    5 mG/acetaminophen 325 mG 1 Tablet(s) Oral every 4 hours PRN Moderate Pain (4 - 6)  oxycodone    5 mG/acetaminophen 325 mG 2 Tablet(s) Oral every 4 hours PRN Severe Pain (7 - 10)      Vital Signs Last 24 Hrs  T(C): 36.9 (14 Mar 2021 12:00), Max: 38.1 (13 Mar 2021 20:00)  T(F): 98.4 (14 Mar 2021 12:00), Max: 100.6 (14 Mar 2021 00:00)  HR: 84 (14 Mar 2021 12:00) (81 - 99)  BP: 121/69 (14 Mar 2021 12:00) (121/69 - 180/78)  BP(mean): 90 (14 Mar 2021 12:00) (88 - 112)  RR: 26 (14 Mar 2021 12:00) (12 - 38)  SpO2: 100% (14 Mar 2021 12:00) (88% - 100%)    PHYSICAL EXAM:  GENERAL: NAD, well-developed  HEAD:  Atraumatic, Normocephalic  EYES: EOMI, PERRLA, conjunctiva and sclera clear  NECK: Supple, No JVD  CHEST/LUNG: Clear to auscultation bilaterally; No wheeze Sternal wound with clean dressing. CTX1  HEART: Regular rate and rhythm; No murmurs, rubs, or gallops  ABDOMEN: Soft, Nontender, Nondistended; Bowel sounds present  EXTREMITIES:  R leg wrapped.  PSYCH: AAOx3  NEUROLOGY: non-focal  SKIN: No rashes or lesions    LABS:                        9.4    15.96 )-----------( 126      ( 14 Mar 2021 00:40 )             28.6     03-14    139  |  106  |  26<H>  ----------------------------<  136<H>  4.2   |  22  |  1.06    Ca    8.6      14 Mar 2021 00:40  Phos  2.7     03-14  Mg     2.2     03-14    TPro  6.0  /  Alb  3.9  /  TBili  0.6  /  DBili  x   /  AST  28  /  ALT  18  /  AlkPhos  55  03-14    PT/INR - ( 13 Mar 2021 00:11 )   PT: 13.9 sec;   INR: 1.17 ratio         PTT - ( 13 Mar 2021 00:11 )  PTT:28.6 sec  CARDIAC MARKERS ( 12 Mar 2021 14:08 )  x     / x     / 576 U/L / x     / 62.2 ng/mL            RADIOLOGY & ADDITIONAL TESTS:    Imaging Personally Reviewed:    Consultant(s) Notes Reviewed:      Care Discussed with Consultants/Other Providers:

## 2021-03-14 NOTE — PROGRESS NOTE ADULT - SUBJECTIVE AND OBJECTIVE BOX
SELENE BE  MRN-[id number]  Patient is a 65y old  Male who presents with a chief complaint of chest pain (13 Mar 2021 11:25)    HPI:  65M with PMH of HTN, T2DM, HLD, hypothyroidism, chronic pancreatitis p/w chest pain. Pt states he has been having CP for past 2 weeks - pain was initially only with exertion, located midsternal/epigastric region, burning pain with occasional radiation to the jaw and associated with DUARTE. Pain would improve after about 5 minutes of rest. However, in past few days pt has started having pain even at rest. Denies any associated nausea/vomiting, diaphoresis, palpitations, syncope/lightheadedness. Pt went to see cardiologist for these symptoms and TTE showed inferolateral wall hypokinesis and referred to ED for possible cath. Pt had a normal stress test 2 years ago for pore-op for RLE arthrocentesis. ROS positive for chronic abd pain 2/2 chronic pancreatitis. Prior smoker, quit 1992.     Pt does not know home medications.  (09 Mar 2021 02:17)      Surgery/Hospital course:  3/12 C3L, preop IABP   3/13 IABP removed     REVIEW OF SYSTEMS:  Gen: No fever  EYES/ENT: No visual changes;  No vertigo or throat pain   NECK: No pain   RES:  No shortness of breath or Cough  CARD: No chest pain   GI: No abdominal pain  : No dysuria  NEURO: No weakness  SKIN: No itching, rashes     Vital Signs Last 24 Hrs  T(C): 38.1 (14 Mar 2021 00:00), Max: 38.1 (13 Mar 2021 20:00)  T(F): 100.6 (14 Mar 2021 00:00), Max: 100.6 (14 Mar 2021 00:00)  HR: 90 (14 Mar 2021 05:00) (71 - 99)  BP: 180/78 (13 Mar 2021 16:00) (180/78 - 180/78)  BP(mean): 112 (13 Mar 2021 16:00) (112 - 112)  RR: 22 (14 Mar 2021 05:00) (17 - 38)  SpO2: 99% (14 Mar 2021 05:00) (88% - 100%)    ============================I/O===========================   I&O's Detail    12 Mar 2021 07:01  -  13 Mar 2021 07:00  --------------------------------------------------------  IN:    Albumin 5%  - 250 mL: 500 mL    Dexmedetomidine: 150.4 mL    Insulin: 49 mL    IV PiggyBack: 300 mL    NiCARdipine: 45 mL    sodium chloride 0.9%: 180 mL  Total IN: 1224.4 mL    OUT:    Chest Tube (mL): 0 mL    Chest Tube (mL): 0 mL    Chest Tube (mL): 210 mL    Indwelling Catheter - Urethral (mL): 1295 mL  Total OUT: 1505 mL    Total NET: -280.6 mL      13 Mar 2021 06:01  -  14 Mar 2021 06:10  --------------------------------------------------------  IN:    Albumin 5%  - 250 mL: 1000 mL    Dexmedetomidine: 33 mL    Insulin: 64 mL    NiCARdipine: 65 mL    Oral Fluid: 390 mL    sodium chloride 0.9%: 220 mL  Total IN: 1772 mL    OUT:    Chest Tube (mL): 20 mL    Chest Tube (mL): 100 mL    Chest Tube (mL): 0 mL    Indwelling Catheter - Urethral (mL): 1500 mL    NiCARdipine: 0 mL  Total OUT: 1620 mL    Total NET: 152 mL        ============================ LABS =========================                        9.4    15.96 )-----------( 126      ( 14 Mar 2021 00:40 )             28.6     03-14    139  |  106  |  26<H>  ----------------------------<  136<H>  4.2   |  22  |  1.06    Ca    8.6      14 Mar 2021 00:40  Phos  2.7     03-14  Mg     2.2     03-14    TPro  6.0  /  Alb  3.9  /  TBili  0.6  /  DBili  x   /  AST  28  /  ALT  18  /  AlkPhos  55  03-14    LIVER FUNCTIONS - ( 14 Mar 2021 00:40 )  Alb: 3.9 g/dL / Pro: 6.0 g/dL / ALK PHOS: 55 U/L / ALT: 18 U/L / AST: 28 U/L / GGT: x           PT/INR - ( 13 Mar 2021 00:11 )   PT: 13.9 sec;   INR: 1.17 ratio         PTT - ( 13 Mar 2021 00:11 )  PTT:28.6 sec  ABG - ( 14 Mar 2021 00:30 )  pH, Arterial: 7.45  pH, Blood: x     /  pCO2: 35    /  pO2: 89    / HCO3: 24    / Base Excess: .6    /  SaO2: 98          ======================Microbiology/Radadiology=================  Telemetry: Reviewed   ECHO: Reviewed  CXR: Reviewed  ======================================================  PAST MEDICAL & SURGICAL HISTORY:  Chronic pancreatitis    Hypothyroidism    HLD (hyperlipidemia)    HTN (hypertension)    DM (diabetes mellitus)    No significant past surgical history      ====================ASSESSMENT ==============  S/P C3L on 3/12, pre-op IABP (removed 3/13)  Hypovolemia   Post op respiratory insufficiency increased A-a gradient  Diabetes mellitus type 2   HLD   HTN  Acquired hypothyroidism  Leukocytosis      Plan:  ====================== NEUROLOGY=====================  Nonfocal, continue to monitor neuro status per ICU protocol.   Percocet PRN for analgesia  OOBTC today, continue ambulation as tolerated     oxycodone    5 mG/acetaminophen 325 mG 1 Tablet(s) Oral every 4 hours PRN Moderate Pain (4 - 6)  oxycodone    5 mG/acetaminophen 325 mG 2 Tablet(s) Oral every 4 hours PRN Severe Pain (7 - 10)  ==================== RESPIRATORY======================  Receiving supplemental O2 therapy with ??.  Continue to monitor RR, breathing pattern, pulse ox, and ABG's.  Encourage incentive spirometry.     ====================CARDIOVASCULAR==================  CAD s/p C3L on 3/12 , preop IABP (removed 3/13)  C/w Lopressor for Afib prophylaxis and rhythm control  Invasive hemodynamic monitoring, assess perfusion indices.   Back up pacing wires in place   C/w ASA/Lipitor for graft patency     metoprolol tartrate 25 milliGRAM(s) Oral every 8 hours  aspirin enteric coated 81 milliGRAM(s) Oral daily  atorvastatin 40 milliGRAM(s) Oral at bedtime  ===================HEMATOLOGIC/ONC ===================  Monitor H&H/Plts   Continue SQ Heparin for venous thromboembolism prophylaxis.     heparin   Injectable 5000 Unit(s) SubCutaneous every 8 hours  ===================== RENAL =========================  Continue to monitor I/Os, BUN/Creatinine, and urine output.   Goal net negative fluid balance. Replete lytes PRN. Keep K> 4 and Mg >2.     ================ GASTROINTESTINAL===================  Tolerating PO consistent carb diet.   Continue Pepcid for stress ulcer prophylaxis.   Bowel regimen with Miralax.     famotidine    Tablet 20 milliGRAM(s) Oral daily  polyethylene glycol 3350 17 Gram(s) Oral daily  sodium chloride 0.9%. 1000 milliLiter(s) (10 mL/Hr) IV Continuous <Continuous>  =======================    ENDOCRINE  =====================  History of Type 2 Diabetes Mellitus, requiring glucose control with insulin gtt, titrate as per insulin drip protocol along with hourly glucose checks.   Synthroid for hypothyroidism, monitor TFTs.    insulin regular Infusion 3 Unit(s)/Hr (3 mL/Hr) IV Continuous <Continuous>  levothyroxine 100 MICROGram(s) Oral daily  ========================INFECTIOUS DISEASE================  Completed Cefuroxime for perioperative antibiotic coverage.  Leukocytosis with WBC 15.96, afebrile.   Monitor temperature and trend WBC closely. Monitor off abx.       TIME:     Patient requires continuous monitoring with bedside rhythm monitoring, pulse ox monitoring, and intermittent blood gas analysis. Care plan discussed with ICU care team. Patient remained critical and at risk for life threatening decompensation.    By signing my name below, I, Landy Bryant, attest that this documentation has been prepared under the direction and in the presence of Raghavendra Santamaria MD   Electronically signed: Livan Duarteibe    Raghavendra SHAIKH, personally performed the services described in this documentation. all medical record entries made by the scribe were at my direction and in my presence. I have reviewed the chart and agree that the record reflects my personal performance and is accurate and complete  Electronically signed: Raghavendra Santamaria MD 03-14-21 @ 06:10       SELENE BE  MRN-[id number]  Patient is a 65y old  Male who presents with a chief complaint of chest pain (13 Mar 2021 11:25)    HPI:  65M with PMH of HTN, T2DM, HLD, hypothyroidism, chronic pancreatitis p/w chest pain. Pt states he has been having CP for past 2 weeks - pain was initially only with exertion, located midsternal/epigastric region, burning pain with occasional radiation to the jaw and associated with DUARTE. Pain would improve after about 5 minutes of rest. However, in past few days pt has started having pain even at rest. Denies any associated nausea/vomiting, diaphoresis, palpitations, syncope/lightheadedness. Pt went to see cardiologist for these symptoms and TTE showed inferolateral wall hypokinesis and referred to ED for possible cath. Pt had a normal stress test 2 years ago for pore-op for RLE arthrocentesis. ROS positive for chronic abd pain 2/2 chronic pancreatitis. Prior smoker, quit 1992.     Pt does not know home medications.  (09 Mar 2021 02:17)      Surgery/Hospital course:  3/12 C3L, preop IABP   3/13 IABP removed     REVIEW OF SYSTEMS:  Gen: No fever  EYES/ENT: No visual changes;  No vertigo or throat pain   NECK: No pain   RES:  No shortness of breath or Cough  CARD: No chest pain   GI: No abdominal pain  : No dysuria  NEURO: No weakness  SKIN: No itching, rashes     Vital Signs Last 24 Hrs  T(C): 38.1 (14 Mar 2021 00:00), Max: 38.1 (13 Mar 2021 20:00)  T(F): 100.6 (14 Mar 2021 00:00), Max: 100.6 (14 Mar 2021 00:00)  HR: 90 (14 Mar 2021 05:00) (71 - 99)  BP: 180/78 (13 Mar 2021 16:00) (180/78 - 180/78)  BP(mean): 112 (13 Mar 2021 16:00) (112 - 112)  RR: 22 (14 Mar 2021 05:00) (17 - 38)  SpO2: 99% (14 Mar 2021 05:00) (88% - 100%)    ============================I/O===========================   I&O's Detail    12 Mar 2021 07:01  -  13 Mar 2021 07:00  --------------------------------------------------------  IN:    Albumin 5%  - 250 mL: 500 mL    Dexmedetomidine: 150.4 mL    Insulin: 49 mL    IV PiggyBack: 300 mL    NiCARdipine: 45 mL    sodium chloride 0.9%: 180 mL  Total IN: 1224.4 mL    OUT:    Chest Tube (mL): 0 mL    Chest Tube (mL): 0 mL    Chest Tube (mL): 210 mL    Indwelling Catheter - Urethral (mL): 1295 mL  Total OUT: 1505 mL    Total NET: -280.6 mL      13 Mar 2021 06:01  -  14 Mar 2021 06:10  --------------------------------------------------------  IN:    Albumin 5%  - 250 mL: 1000 mL    Dexmedetomidine: 33 mL    Insulin: 64 mL    NiCARdipine: 65 mL    Oral Fluid: 390 mL    sodium chloride 0.9%: 220 mL  Total IN: 1772 mL    OUT:    Chest Tube (mL): 20 mL    Chest Tube (mL): 100 mL    Chest Tube (mL): 0 mL    Indwelling Catheter - Urethral (mL): 1500 mL    NiCARdipine: 0 mL  Total OUT: 1620 mL    Total NET: 152 mL        ============================ LABS =========================                        9.4    15.96 )-----------( 126      ( 14 Mar 2021 00:40 )             28.6     03-14    139  |  106  |  26<H>  ----------------------------<  136<H>  4.2   |  22  |  1.06    Ca    8.6      14 Mar 2021 00:40  Phos  2.7     03-14  Mg     2.2     03-14    TPro  6.0  /  Alb  3.9  /  TBili  0.6  /  DBili  x   /  AST  28  /  ALT  18  /  AlkPhos  55  03-14    LIVER FUNCTIONS - ( 14 Mar 2021 00:40 )  Alb: 3.9 g/dL / Pro: 6.0 g/dL / ALK PHOS: 55 U/L / ALT: 18 U/L / AST: 28 U/L / GGT: x           PT/INR - ( 13 Mar 2021 00:11 )   PT: 13.9 sec;   INR: 1.17 ratio         PTT - ( 13 Mar 2021 00:11 )  PTT:28.6 sec  ABG - ( 14 Mar 2021 00:30 )  pH, Arterial: 7.45  pH, Blood: x     /  pCO2: 35    /  pO2: 89    / HCO3: 24    / Base Excess: .6    /  SaO2: 98          ======================Microbiology/Radadiology=================  Telemetry: Reviewed   ECHO: Reviewed  CXR: Reviewed  ======================================================  PAST MEDICAL & SURGICAL HISTORY:  Chronic pancreatitis    Hypothyroidism    HLD (hyperlipidemia)    HTN (hypertension)    DM (diabetes mellitus)    No significant past surgical history      ====================ASSESSMENT ==============  S/P C3L on 3/12, pre-op IABP (removed 3/13)  Hypovolemia   Post op respiratory insufficiency increased A-a gradient  Diabetes mellitus type 2   HLD   HTN  Acquired hypothyroidism  Leukocytosis      Plan:  ====================== NEUROLOGY=====================  Nonfocal, continue to monitor neuro status per ICU protocol.   Percocet PRN for analgesia  OOBTC today, continue ambulation as tolerated     oxycodone    5 mG/acetaminophen 325 mG 1 Tablet(s) Oral every 4 hours PRN Moderate Pain (4 - 6)  oxycodone    5 mG/acetaminophen 325 mG 2 Tablet(s) Oral every 4 hours PRN Severe Pain (7 - 10)  ==================== RESPIRATORY======================  Receiving supplemental O2 therapy with Aerosol mask FiO2 50%.  Continue to monitor RR, breathing pattern, pulse ox, and ABG's.  Encourage incentive spirometry.     ====================CARDIOVASCULAR==================  CAD s/p C3L on 3/12 , preop IABP (removed 3/13)  C/w Lopressor for Afib prophylaxis and rhythm control  Invasive hemodynamic monitoring, assess perfusion indices.   Back up pacing wires in place   C/w ASA/Lipitor for graft patency     metoprolol tartrate 25 milliGRAM(s) Oral every 8 hours  aspirin enteric coated 81 milliGRAM(s) Oral daily  atorvastatin 40 milliGRAM(s) Oral at bedtime  ===================HEMATOLOGIC/ONC ===================  Monitor H&H/Plts   Continue SQ Heparin for venous thromboembolism prophylaxis.     heparin   Injectable 5000 Unit(s) SubCutaneous every 8 hours  ===================== RENAL =========================  Continue to monitor I/Os, BUN/Creatinine, and urine output.   Goal net negative fluid balance. Replete lytes PRN. Keep K> 4 and Mg >2.     ================ GASTROINTESTINAL===================  Tolerating PO consistent carb diet.   Continue Pepcid for stress ulcer prophylaxis.   Bowel regimen with Miralax.     famotidine    Tablet 20 milliGRAM(s) Oral daily  polyethylene glycol 3350 17 Gram(s) Oral daily  sodium chloride 0.9%. 1000 milliLiter(s) (10 mL/Hr) IV Continuous <Continuous>  =======================    ENDOCRINE  =====================  History of Type 2 Diabetes Mellitus, requiring glucose control with insulin gtt, titrate as per insulin drip protocol along with hourly glucose checks.   Synthroid for hypothyroidism, monitor TFTs.    insulin regular Infusion 3 Unit(s)/Hr (3 mL/Hr) IV Continuous <Continuous>  levothyroxine 100 MICROGram(s) Oral daily  ========================INFECTIOUS DISEASE================  Completed Cefuroxime for perioperative antibiotic coverage.  Leukocytosis with WBC 15.96, afebrile.   Monitor temperature and trend WBC closely. Monitor off abx.       TIME:     Patient requires continuous monitoring with bedside rhythm monitoring, pulse ox monitoring, and intermittent blood gas analysis. Care plan discussed with ICU care team. Patient remained critical and at risk for life threatening decompensation.    By signing my name below, I, Landy Bryant, attest that this documentation has been prepared under the direction and in the presence of Raghavendra Santamaria MD   Electronically signed: Alonso Duarte Matthew Pierce, personally performed the services described in this documentation. all medical record entries made by the scribe were at my direction and in my presence. I have reviewed the chart and agree that the record reflects my personal performance and is accurate and complete  Electronically signed: Raghavendra Santamaria MD 03-14-21 @ 06:10       SELENE BE  MRN-[id number]  Patient is a 65y old  Male who presents with a chief complaint of chest pain (13 Mar 2021 11:25)    HPI:  65M with PMH of HTN, T2DM, HLD, hypothyroidism, chronic pancreatitis p/w chest pain. Pt states he has been having CP for past 2 weeks - pain was initially only with exertion, located midsternal/epigastric region, burning pain with occasional radiation to the jaw and associated with DUARTE. Pain would improve after about 5 minutes of rest. However, in past few days pt has started having pain even at rest. Denies any associated nausea/vomiting, diaphoresis, palpitations, syncope/lightheadedness. Pt went to see cardiologist for these symptoms and TTE showed inferolateral wall hypokinesis and referred to ED for possible cath. Pt had a normal stress test 2 years ago for pore-op for RLE arthrocentesis. ROS positive for chronic abd pain 2/2 chronic pancreatitis. Prior smoker, quit 1992.     Pt does not know home medications.  (09 Mar 2021 02:17)      Surgery/Hospital course:  3/12 C3L, preop IABP   3/13 IABP removed     REVIEW OF SYSTEMS:  Gen: No fever  EYES/ENT: No visual changes;  No vertigo or throat pain   NECK: No pain   RES:  No shortness of breath or Cough  CARD: No chest pain   GI: No abdominal pain  : No dysuria  NEURO: No weakness  SKIN: No itching, rashes     Vital Signs Last 24 Hrs  T(C): 38.1 (14 Mar 2021 00:00), Max: 38.1 (13 Mar 2021 20:00)  T(F): 100.6 (14 Mar 2021 00:00), Max: 100.6 (14 Mar 2021 00:00)  HR: 90 (14 Mar 2021 05:00) (71 - 99)  BP: 180/78 (13 Mar 2021 16:00) (180/78 - 180/78)  BP(mean): 112 (13 Mar 2021 16:00) (112 - 112)  RR: 22 (14 Mar 2021 05:00) (17 - 38)  SpO2: 99% (14 Mar 2021 05:00) (88% - 100%)    ============================I/O===========================   I&O's Detail    12 Mar 2021 07:01  -  13 Mar 2021 07:00  --------------------------------------------------------  IN:    Albumin 5%  - 250 mL: 500 mL    Dexmedetomidine: 150.4 mL    Insulin: 49 mL    IV PiggyBack: 300 mL    NiCARdipine: 45 mL    sodium chloride 0.9%: 180 mL  Total IN: 1224.4 mL    OUT:    Chest Tube (mL): 0 mL    Chest Tube (mL): 0 mL    Chest Tube (mL): 210 mL    Indwelling Catheter - Urethral (mL): 1295 mL  Total OUT: 1505 mL    Total NET: -280.6 mL      13 Mar 2021 06:01  -  14 Mar 2021 06:10  --------------------------------------------------------  IN:    Albumin 5%  - 250 mL: 1000 mL    Dexmedetomidine: 33 mL    Insulin: 64 mL    NiCARdipine: 65 mL    Oral Fluid: 390 mL    sodium chloride 0.9%: 220 mL  Total IN: 1772 mL    OUT:    Chest Tube (mL): 20 mL    Chest Tube (mL): 100 mL    Chest Tube (mL): 0 mL    Indwelling Catheter - Urethral (mL): 1500 mL    NiCARdipine: 0 mL  Total OUT: 1620 mL    Total NET: 152 mL        ============================ LABS =========================                        9.4    15.96 )-----------( 126      ( 14 Mar 2021 00:40 )             28.6     03-14    139  |  106  |  26<H>  ----------------------------<  136<H>  4.2   |  22  |  1.06    Ca    8.6      14 Mar 2021 00:40  Phos  2.7     03-14  Mg     2.2     03-14    TPro  6.0  /  Alb  3.9  /  TBili  0.6  /  DBili  x   /  AST  28  /  ALT  18  /  AlkPhos  55  03-14    LIVER FUNCTIONS - ( 14 Mar 2021 00:40 )  Alb: 3.9 g/dL / Pro: 6.0 g/dL / ALK PHOS: 55 U/L / ALT: 18 U/L / AST: 28 U/L / GGT: x           PT/INR - ( 13 Mar 2021 00:11 )   PT: 13.9 sec;   INR: 1.17 ratio         PTT - ( 13 Mar 2021 00:11 )  PTT:28.6 sec  ABG - ( 14 Mar 2021 00:30 )  pH, Arterial: 7.45  pH, Blood: x     /  pCO2: 35    /  pO2: 89    / HCO3: 24    / Base Excess: .6    /  SaO2: 98          ======================Microbiology/Radadiology=================  Telemetry: Reviewed   ECHO: Reviewed  CXR: Reviewed  ======================================================  PAST MEDICAL & SURGICAL HISTORY:  Chronic pancreatitis    Hypothyroidism    HLD (hyperlipidemia)    HTN (hypertension)    DM (diabetes mellitus)    No significant past surgical history      ====================ASSESSMENT ==============  S/P C3L on 3/12, pre-op IABP (removed 3/13)  Hypovolemia   Post op respiratory insufficiency increased A-a gradient  Diabetes mellitus type 2   HLD   HTN  Acquired hypothyroidism  Leukocytosis      Plan:  ====================== NEUROLOGY=====================  Nonfocal, continue to monitor neuro status per ICU protocol.   Percocet PRN for analgesia, PCA ordered for additional pain control  OOBTC currently, will ambulation today.    oxycodone    5 mG/acetaminophen 325 mG 1 Tablet(s) Oral every 4 hours PRN Moderate Pain (4 - 6)  oxycodone    5 mG/acetaminophen 325 mG 2 Tablet(s) Oral every 4 hours PRN Severe Pain (7 - 10)  ==================== RESPIRATORY======================  Receiving supplemental O2 therapy with Aerosol mask FiO2 50%.  Continue to monitor RR, breathing pattern, pulse ox, and ABG's.  Encourage incentive spirometry.     ====================CARDIOVASCULAR==================  CAD s/p C3L on 3/12 , preop IABP (removed 3/13)  C/w Lopressor for Afib prophylaxis and rhythm control  A line and R IJ intro will be D/C'ed today  Back up pacing wires in place   C/w ASA/Lipitor for graft patency     metoprolol tartrate 25 milliGRAM(s) Oral every 8 hours  aspirin enteric coated 81 milliGRAM(s) Oral daily  atorvastatin 40 milliGRAM(s) Oral at bedtime  ===================HEMATOLOGIC/ONC ===================  Monitor H&H/Plts   Continue SQ Heparin for venous thromboembolism prophylaxis.   Will remove chest tubes today.    heparin   Injectable 5000 Unit(s) SubCutaneous every 8 hours  ===================== RENAL =========================  Continue to monitor I/Os, BUN/Creatinine, and urine output.   Goal net negative fluid balance. Replete lytes PRN. Keep K> 4 and Mg >2.   Will remove [facility] today.    ================ GASTROINTESTINAL===================  Tolerating PO consistent carb diet.   Continue Pepcid for stress ulcer prophylaxis.   Bowel regimen with Miralax.     famotidine    Tablet 20 milliGRAM(s) Oral daily  polyethylene glycol 3350 17 Gram(s) Oral daily  sodium chloride 0.9%. 1000 milliLiter(s) (10 mL/Hr) IV Continuous <Continuous>  =======================    ENDOCRINE  =====================  History of Type 2 Diabetes Mellitus, requiring glucose control with insulin gtt, titrate as per insulin drip protocol along with hourly glucose checks.   Synthroid for hypothyroidism, monitor TFTs.    insulin regular Infusion 3 Unit(s)/Hr (3 mL/Hr) IV Continuous <Continuous>  levothyroxine 100 MICROGram(s) Oral daily  ========================INFECTIOUS DISEASE================  Completed Cefuroxime for perioperative antibiotic coverage.  Leukocytosis with WBC 15.96, afebrile.   Monitor temperature and trend WBC closely. Monitor off abx.       TIME:     Pt is a candidate to the stepdown unit.    Patient requires continuous monitoring with bedside rhythm monitoring, pulse ox monitoring, and intermittent blood gas analysis. Care plan discussed with ICU care team. Patient remained critical and at risk for life threatening decompensation.    By signing my name below, I, Landy Bryant, attest that this documentation has been prepared under the direction and in the presence of Raghavendra Santamaria MD   Electronically signed: Alonso Duarte Matthew Pierce, personally performed the services described in this documentation. all medical record entries made by the scribe were at my direction and in my presence. I have reviewed the chart and agree that the record reflects my personal performance and is accurate and complete  Electronically signed: Raghavendra Santamaria MD 03-14-21 @ 06:10       SELENE BE  MRN-[id number]  Patient is a 65y old  Male who presents with a chief complaint of chest pain (13 Mar 2021 11:25)    HPI:  65M with PMH of HTN, T2DM, HLD, hypothyroidism, chronic pancreatitis p/w chest pain. Pt states he has been having CP for past 2 weeks - pain was initially only with exertion, located midsternal/epigastric region, burning pain with occasional radiation to the jaw and associated with DURATE. Pain would improve after about 5 minutes of rest. However, in past few days pt has started having pain even at rest. Denies any associated nausea/vomiting, diaphoresis, palpitations, syncope/lightheadedness. Pt went to see cardiologist for these symptoms and TTE showed inferolateral wall hypokinesis and referred to ED for possible cath. Pt had a normal stress test 2 years ago for pore-op for RLE arthrocentesis. ROS positive for chronic abd pain 2/2 chronic pancreatitis. Prior smoker, quit 1992.     Pt does not know home medications.  (09 Mar 2021 02:17)      Surgery/Hospital course:  3/12 C3L, preop IABP   3/13 IABP removed     REVIEW OF SYSTEMS:  Gen: No fever  EYES/ENT: No visual changes;  No vertigo or throat pain   NECK: No pain   RES:  No shortness of breath or Cough  CARD: No chest pain   GI: No abdominal pain  : No dysuria  NEURO: No weakness  SKIN: No itching, rashes     Vital Signs Last 24 Hrs  T(C): 38.1 (14 Mar 2021 00:00), Max: 38.1 (13 Mar 2021 20:00)  T(F): 100.6 (14 Mar 2021 00:00), Max: 100.6 (14 Mar 2021 00:00)  HR: 90 (14 Mar 2021 05:00) (71 - 99)  BP: 180/78 (13 Mar 2021 16:00) (180/78 - 180/78)  BP(mean): 112 (13 Mar 2021 16:00) (112 - 112)  RR: 22 (14 Mar 2021 05:00) (17 - 38)  SpO2: 99% (14 Mar 2021 05:00) (88% - 100%)    ============================I/O===========================   I&O's Detail    12 Mar 2021 07:01  -  13 Mar 2021 07:00  --------------------------------------------------------  IN:    Albumin 5%  - 250 mL: 500 mL    Dexmedetomidine: 150.4 mL    Insulin: 49 mL    IV PiggyBack: 300 mL    NiCARdipine: 45 mL    sodium chloride 0.9%: 180 mL  Total IN: 1224.4 mL    OUT:    Chest Tube (mL): 0 mL    Chest Tube (mL): 0 mL    Chest Tube (mL): 210 mL    Indwelling Catheter - Urethral (mL): 1295 mL  Total OUT: 1505 mL    Total NET: -280.6 mL      13 Mar 2021 06:01  -  14 Mar 2021 06:10  --------------------------------------------------------  IN:    Albumin 5%  - 250 mL: 1000 mL    Dexmedetomidine: 33 mL    Insulin: 64 mL    NiCARdipine: 65 mL    Oral Fluid: 390 mL    sodium chloride 0.9%: 220 mL  Total IN: 1772 mL    OUT:    Chest Tube (mL): 20 mL    Chest Tube (mL): 100 mL    Chest Tube (mL): 0 mL    Indwelling Catheter - Urethral (mL): 1500 mL    NiCARdipine: 0 mL  Total OUT: 1620 mL    Total NET: 152 mL        ============================ LABS =========================                        9.4    15.96 )-----------( 126      ( 14 Mar 2021 00:40 )             28.6     03-14    139  |  106  |  26<H>  ----------------------------<  136<H>  4.2   |  22  |  1.06    Ca    8.6      14 Mar 2021 00:40  Phos  2.7     03-14  Mg     2.2     03-14    TPro  6.0  /  Alb  3.9  /  TBili  0.6  /  DBili  x   /  AST  28  /  ALT  18  /  AlkPhos  55  03-14    LIVER FUNCTIONS - ( 14 Mar 2021 00:40 )  Alb: 3.9 g/dL / Pro: 6.0 g/dL / ALK PHOS: 55 U/L / ALT: 18 U/L / AST: 28 U/L / GGT: x           PT/INR - ( 13 Mar 2021 00:11 )   PT: 13.9 sec;   INR: 1.17 ratio         PTT - ( 13 Mar 2021 00:11 )  PTT:28.6 sec  ABG - ( 14 Mar 2021 00:30 )  pH, Arterial: 7.45  pH, Blood: x     /  pCO2: 35    /  pO2: 89    / HCO3: 24    / Base Excess: .6    /  SaO2: 98          ======================Microbiology/Radadiology=================  Telemetry: Reviewed   ECHO: Reviewed  CXR: Reviewed  ======================================================  PAST MEDICAL & SURGICAL HISTORY:  Chronic pancreatitis    Hypothyroidism    HLD (hyperlipidemia)    HTN (hypertension)    DM (diabetes mellitus)    No significant past surgical history      ====================ASSESSMENT ==============  S/P C3L on 3/12, pre-op IABP (removed 3/13)  Hypovolemia   Post op respiratory insufficiency increased A-a gradient  Diabetes mellitus type 2   HLD   HTN  Acquired hypothyroidism  Leukocytosis      Plan:  ====================== NEUROLOGY=====================  Nonfocal, continue to monitor neuro status per ICU protocol.   Percocet PRN for analgesia, PCA ordered for additional pain control  OOBTC currently, will ambulation today.    oxycodone    5 mG/acetaminophen 325 mG 1 Tablet(s) Oral every 4 hours PRN Moderate Pain (4 - 6)  oxycodone    5 mG/acetaminophen 325 mG 2 Tablet(s) Oral every 4 hours PRN Severe Pain (7 - 10)  ==================== RESPIRATORY======================  Receiving supplemental O2 therapy with Aerosol mask FiO2 50%.  Continue to monitor RR, breathing pattern, pulse ox, and ABG's.  Encourage incentive spirometry.     ====================CARDIOVASCULAR==================  CAD s/p C3L on 3/12 , preop IABP (removed 3/13)  C/w Lopressor for Afib prophylaxis and rhythm control  A line and R IJ intro will be D/C'ed today  Back up pacing wires in place   C/w ASA/Lipitor for graft patency     metoprolol tartrate 25 milliGRAM(s) Oral every 8 hours  aspirin enteric coated 81 milliGRAM(s) Oral daily  atorvastatin 40 milliGRAM(s) Oral at bedtime  ===================HEMATOLOGIC/ONC ===================  Monitor H&H/Plts   Continue SQ Heparin for venous thromboembolism prophylaxis.   Will remove chest tubes today.    heparin   Injectable 5000 Unit(s) SubCutaneous every 8 hours  ===================== RENAL =========================  Continue to monitor I/Os, BUN/Creatinine, and urine output.   Goal net negative fluid balance. Replete lytes PRN. Keep K> 4 and Mg >2.   Will remove [facility] today.    ================ GASTROINTESTINAL===================  Tolerating PO consistent carb diet.   Continue Pepcid for stress ulcer prophylaxis.   Bowel regimen with Miralax.     famotidine    Tablet 20 milliGRAM(s) Oral daily  polyethylene glycol 3350 17 Gram(s) Oral daily  sodium chloride 0.9%. 1000 milliLiter(s) (10 mL/Hr) IV Continuous <Continuous>  =======================    ENDOCRINE  =====================  History of Type 2 Diabetes Mellitus, requiring glucose control with insulin gtt, titrate as per insulin drip protocol along with hourly glucose checks.   Synthroid for hypothyroidism, monitor TFTs.    insulin regular Infusion 3 Unit(s)/Hr (3 mL/Hr) IV Continuous <Continuous>  levothyroxine 100 MICROGram(s) Oral daily  ========================INFECTIOUS DISEASE================  Completed Cefuroxime for perioperative antibiotic coverage.  Leukocytosis with WBC 15.96, afebrile.   Monitor temperature and trend WBC closely. Monitor off abx.     Patient requires continuous monitoring with bedside rhythm monitoring, pulse ox monitoring, and intermittent blood gas analysis. Care plan discussed with ICU care team.    By signing my name below, I, Landy Bryant, attest that this documentation has been prepared under the direction and in the presence of Raghavendra Santamaria MD   Electronically signed: Alonso Duarte, Raghavendra Santamaria, personally performed the services described in this documentation. all medical record entries made by the scribe were at my direction and in my presence. I have reviewed the chart and agree that the record reflects my personal performance and is accurate and complete  Electronically signed: Raghavendra Santamaria MD 03-14-21 @ 06:10

## 2021-03-14 NOTE — PROGRESS NOTE ADULT - PROBLEM SELECTOR PLAN 5
ASA continued for graft occlusion-thromboembolism prophylaxis  Lipitor was also started for long term graft patency  Lopressor initiated for tachycardia and atrial fibrillation prophylaxis  VTE prophylaxis with SCD+ heparin  Pepcid maintained for GI bleeding prophylaxis Chronic use of tylenol #3

## 2021-03-14 NOTE — PROGRESS NOTE ADULT - PROBLEM SELECTOR PLAN 2
--Controlled Carb diet  --Glucose control with insulin drip on transfer then transitioned to basal bolus +sliding scale as per endocrine recommendations:   --3/15/21 please increase to Lantus 45units qhs.   --Admelog 10units TIDac, patient will likely needed more premeal  --Moderate correctional scale pre-meal and qhs   --Recommend basal/bolus insulin regimen for discharge  --Will discuss with patient if he is not willing to do basal/bolus maybe a mixed insulin.   --On discharge patient can follow up with  [facility] [address] [place] [phone]

## 2021-03-14 NOTE — CONSULT NOTE ADULT - SUBJECTIVE AND OBJECTIVE BOX
HPI: 65M with history of HTN, T2DM, HLD, hypothyroidism, chronic pancreatitis p/w chest pain. Pt states he has been having CP for past 2 weeks - pain was initially only with exertion, located midsternal/epigastric region, burning pain with occasional radiation to the jaw and associated with DUARTE. Pt went to see cardiologist for these symptoms and TTE showed inferolateral wall hypokinesis and referred to ED for possible cath. Patient admitted for unstable angina. Now s/p CABG 3/12. After CABG patient was on insulin gtt. BG at goal 100-180.  Endocrine consult requested to manage uncontrolled DM2.    Endocrine history:  Patient was diagnosed with DM 2 in 1992, around the time of diagnosis of chronic pancreatitis, per patient secondary to gallstones.   Patient follows with PCP.   Reports taking Novolin-N 60units BID. Not currently on PO medications.   Patient reports episodes of hypoglycemia during the day and overnight.   Patient reports h/o DM retinopathy and neuropathy.     Reports history of hypothyroidism for few years. Reports taking Lt4 100mcg qam. Does not report Amiodarone or Lithium use.   Patient does not report heat/cold intolerance. No constipation or diarrhea. No fatigue. No tremor or anxiousness.       PAST MEDICAL & SURGICAL HISTORY:  Chronic pancreatitis  Hypothyroidism  HLD (hyperlipidemia)  HTN (hypertension)  DM (diabetes mellitus)  No significant past surgical history    FAMILY HISTORY:  Reports strong family history of DM    Social History:  Reports to alcohol or tobacco use since 1992    Outpatient Medications:  Aspirin Enteric Coated 81 mg oral delayed release tablet: 1 tab(s) orally once a day (09 Mar 2021 11:41)  Lipitor 20 mg oral tablet: 1 tab(s) orally once a day (09 Mar 2021 11:41)  losartan 100 mg oral tablet: 1 tab(s) orally once a day (09 Mar 2021 11:41)  metoprolol succinate 50 mg oral tablet, extended release: 1 tab(s) orally once a day    AS PER WIFE, RECENT MED FROM [facility] ON 3/3/21 BUT WAS TOLD TO STOP TAKING ON 3/8/21 (09 Mar 2021 11:37)  NovoLIN N 100 units/mL subcutaneous suspension: 60 unit(s) subcutaneous 2 times a day (09 Mar 2021 11:41)  Synthroid 100 mcg (0.1 mg) oral tablet: 1 tab(s) orally once a day (09 Mar 2021 12:14)  Xalatan 0.005% ophthalmic solution: 1 drop(s) in each eye once a day (in the evening) (09 Mar 2021 11:41)    MEDICATIONS  (STANDING):  aspirin enteric coated 81 milliGRAM(s) Oral daily  atorvastatin 40 milliGRAM(s) Oral at bedtime  chlorhexidine 2% Cloths 1 Application(s) Topical <User Schedule>  famotidine    Tablet 20 milliGRAM(s) Oral daily  heparin   Injectable 5000 Unit(s) SubCutaneous every 8 hours  HYDROmorphone PCA (1 mG/mL) 30 milliLiter(s) PCA Continuous PCA Continuous  insulin glargine Injectable (LANTUS) 12 Unit(s) SubCutaneous every morning  insulin regular Infusion 3 Unit(s)/Hr (3 mL/Hr) IV Continuous <Continuous>  latanoprost 0.005% Ophthalmic Solution 1 Drop(s) Both EYES at bedtime  levothyroxine 100 MICROGram(s) Oral daily  metoclopramide Injectable 10 milliGRAM(s) IV Push every 8 hours  metoprolol tartrate 25 milliGRAM(s) Oral every 8 hours  mupirocin 2% Ointment 1 Application(s) Topical two times a day  polyethylene glycol 3350 17 Gram(s) Oral daily    MEDICATIONS  (PRN):  BACItracin   Ointment 1 Application(s) Topical every 12 hours PRN Pain/itching  HYDROmorphone PCA (1 mG/mL) Rescue Clinician Bolus 0.5 milliGRAM(s) IV Push every 15 minutes PRN for Pain Scale GREATER THAN 6  naloxone Injectable 0.1 milliGRAM(s) IV Push every 3 minutes PRN For ANY of the following changes in patient status:  A. RR LESS THAN 10 breaths per minute, B. Oxygen saturation LESS THAN 90%, C. Sedation score of 6  ondansetron Injectable 4 milliGRAM(s) IV Push every 6 hours PRN Nausea    Allergies  morphine (Urticaria)    Review of Systems:  Constitutional: No fever  Eyes: No blurry vision  Neuro: No tremors  HEENT: No pain  Cardiovascular: + chest pain, palpitations  Respiratory: No SOB, no cough  GI: No nausea, vomiting, abdominal pain  : No dysuria  Endocrine: no polyuria, polydipsia  Hem/lymph: no swelling    ALL OTHER SYSTEMS REVIEWED AND NEGATIVE      PHYSICAL EXAM:  VITALS: T(C): 36.9 (03-14-21 @ 12:00)  T(F): 98.4 (03-14-21 @ 12:00), Max: 100.6 (03-14-21 @ 00:00)  HR: 82 (03-14-21 @ 15:07) (81 - 99)  BP: 144/67 (03-14-21 @ 15:07) (121/69 - 144/67)  RR:  (12 - 38)  SpO2:  (88% - 100%)  Wt(kg): 88kg   GENERAL: NAD, well-groomed, well-developed  EYES: No proptosis, no lid lag, anicteric  HEENT:  Atraumatic, Normocephalic, moist mucous membranes  THYROID: Normal size, no palpable nodules, nontender   RESPIRATORY: Clear to auscultation bilaterally; No rales, rhonchi, wheezing  CARDIOVASCULAR: Regular rate and rhythm; No murmurs; no peripheral edema  GI: Soft, nontender, non distended, normal bowel sounds  SKIN: incision from sternotomy covered with gauze   MUSCULOSKELETAL: Full range of motion, normal strength  NEURO: sensation intact, extraocular movements intact, no tremor  PSYCH: Alert and oriented x 3, reactive affect     POCT Blood Glucose.: 135 mg/dL (03-14-21 @ 17:02)  POCT Blood Glucose.: 147 mg/dL (03-14-21 @ 16:12)  POCT Blood Glucose.: 195 mg/dL (03-14-21 @ 15:07)  POCT Blood Glucose.: 178 mg/dL (03-14-21 @ 14:11)  POCT Blood Glucose.: 177 mg/dL (03-14-21 @ 12:54)  POCT Blood Glucose.: 161 mg/dL (03-14-21 @ 12:05)  POCT Blood Glucose.: 153 mg/dL (03-14-21 @ 11:49)  POCT Blood Glucose.: 145 mg/dL (03-14-21 @ 10:07)  POCT Blood Glucose.: 145 mg/dL (03-14-21 @ 08:58)  POCT Blood Glucose.: 107 mg/dL (03-14-21 @ 08:10)  POCT Blood Glucose.: 110 mg/dL (03-14-21 @ 06:28)  POCT Blood Glucose.: 101 mg/dL (03-14-21 @ 04:01)  POCT Blood Glucose.: 118 mg/dL (03-14-21 @ 01:10)  POCT Blood Glucose.: 144 mg/dL (03-13-21 @ 23:01)  POCT Blood Glucose.: 91 mg/dL (03-13-21 @ 21:47)  POCT Blood Glucose.: 112 mg/dL (03-13-21 @ 20:57)  POCT Blood Glucose.: 123 mg/dL (03-13-21 @ 18:57)  POCT Blood Glucose.: 138 mg/dL (03-13-21 @ 18:16)  POCT Blood Glucose.: 141 mg/dL (03-13-21 @ 16:52)  POCT Blood Glucose.: 148 mg/dL (03-13-21 @ 16:06)  POCT Blood Glucose.: 165 mg/dL (03-13-21 @ 15:10)  POCT Blood Glucose.: 143 mg/dL (03-13-21 @ 14:01)  POCT Blood Glucose.: 129 mg/dL (03-13-21 @ 12:58)  POCT Blood Glucose.: 122 mg/dL (03-13-21 @ 11:52)  POCT Blood Glucose.: 108 mg/dL (03-13-21 @ 11:20)  POCT Blood Glucose.: 107 mg/dL (03-13-21 @ 10:02)  POCT Blood Glucose.: 107 mg/dL (03-13-21 @ 08:56)  POCT Blood Glucose.: 121 mg/dL (03-13-21 @ 07:58)  POCT Blood Glucose.: 131 mg/dL (03-13-21 @ 02:47)  POCT Blood Glucose.: 119 mg/dL (03-13-21 @ 02:00)  POCT Blood Glucose.: 111 mg/dL (03-13-21 @ 01:35)  POCT Blood Glucose.: 131 mg/dL (03-12-21 @ 18:52)  POCT Blood Glucose.: 135 mg/dL (03-12-21 @ 17:49)  POCT Blood Glucose.: 177 mg/dL (03-12-21 @ 15:58)  POCT Blood Glucose.: 285 mg/dL (03-12-21 @ 06:41)  POCT Blood Glucose.: 161 mg/dL (03-11-21 @ 21:20)  POCT Blood Glucose.: 127 mg/dL (03-11-21 @ 17:38)                            9.4    15.96 )-----------( 126      ( 14 Mar 2021 00:40 )             28.6       03-14    139  |  106  |  26<H>  ----------------------------<  136<H>  4.2   |  22  |  1.06    EGFR if : 86  EGFR if non : 74    Ca    8.6      03-14  Mg     2.2     03-14  Phos  2.7     03-14    TPro  6.0  /  Alb  3.9  /  TBili  0.6  /  DBili  x   /  AST  28  /  ALT  18  /  AlkPhos  55  03-14      Thyroid Function Tests:  03-09 @ 10:36 TSH 3.45    03-09 Chol 111 LDL -- HDL 36<L> Trig 101                 HPI: 65M with history of HTN, T2DM, HLD, hypothyroidism, chronic pancreatitis p/w chest pain. Pt states he has been having CP for past 2 weeks - pain was initially only with exertion, located midsternal/epigastric region, burning pain with occasional radiation to the jaw and associated with DUARTE. Pt went to see cardiologist for these symptoms and TTE showed inferolateral wall hypokinesis and referred to ED for possible cath. Patient admitted for unstable angina. Now s/p CABG 3/12. After CABG patient was on insulin gtt. BG at goal 100-180.  Endocrine consult requested to manage uncontrolled DM2.    Endocrine history:  Patient was diagnosed with DM 2 in 1992, around the time of diagnosis of chronic pancreatitis, per patient secondary to gallstones.   Patient follows with PCP.   Reports taking Novolin-N 60units BID. Not currently on PO medications.   Patient reports episodes of hypoglycemia during the day and overnight.   Patient reports h/o DM retinopathy and neuropathy.     Reports history of hypothyroidism for few years. Reports taking Lt4 100mcg qam. Does not report Amiodarone or Lithium use.   Patient does not report heat/cold intolerance. No constipation or diarrhea. No fatigue. No tremor or anxiousness.       PAST MEDICAL & SURGICAL HISTORY:  Chronic pancreatitis  Hypothyroidism  HLD (hyperlipidemia)  HTN (hypertension)  DM (diabetes mellitus)  No significant past surgical history    FAMILY HISTORY:  Reports strong family history of DM    Social History:  Reports to alcohol or tobacco use since 1992    Outpatient Medications:  Aspirin Enteric Coated 81 mg oral delayed release tablet: 1 tab(s) orally once a day (09 Mar 2021 11:41)  Lipitor 20 mg oral tablet: 1 tab(s) orally once a day (09 Mar 2021 11:41)  losartan 100 mg oral tablet: 1 tab(s) orally once a day (09 Mar 2021 11:41)  metoprolol succinate 50 mg oral tablet, extended release: 1 tab(s) orally once a day    AS PER WIFE, RECENT MED FROM [facility] ON 3/3/21 BUT WAS TOLD TO STOP TAKING ON 3/8/21 (09 Mar 2021 11:37)  NovoLIN N 100 units/mL subcutaneous suspension: 60 unit(s) subcutaneous 2 times a day (09 Mar 2021 11:41)  Synthroid 100 mcg (0.1 mg) oral tablet: 1 tab(s) orally once a day (09 Mar 2021 12:14)  Xalatan 0.005% ophthalmic solution: 1 drop(s) in each eye once a day (in the evening) (09 Mar 2021 11:41)    MEDICATIONS  (STANDING):  aspirin enteric coated 81 milliGRAM(s) Oral daily  atorvastatin 40 milliGRAM(s) Oral at bedtime  chlorhexidine 2% Cloths 1 Application(s) Topical <User Schedule>  famotidine    Tablet 20 milliGRAM(s) Oral daily  heparin   Injectable 5000 Unit(s) SubCutaneous every 8 hours  HYDROmorphone PCA (1 mG/mL) 30 milliLiter(s) PCA Continuous PCA Continuous  insulin glargine Injectable (LANTUS) 12 Unit(s) SubCutaneous every morning  insulin regular Infusion 3 Unit(s)/Hr (3 mL/Hr) IV Continuous <Continuous>  latanoprost 0.005% Ophthalmic Solution 1 Drop(s) Both EYES at bedtime  levothyroxine 100 MICROGram(s) Oral daily  metoclopramide Injectable 10 milliGRAM(s) IV Push every 8 hours  metoprolol tartrate 25 milliGRAM(s) Oral every 8 hours  mupirocin 2% Ointment 1 Application(s) Topical two times a day  polyethylene glycol 3350 17 Gram(s) Oral daily    MEDICATIONS  (PRN):  BACItracin   Ointment 1 Application(s) Topical every 12 hours PRN Pain/itching  HYDROmorphone PCA (1 mG/mL) Rescue Clinician Bolus 0.5 milliGRAM(s) IV Push every 15 minutes PRN for Pain Scale GREATER THAN 6  naloxone Injectable 0.1 milliGRAM(s) IV Push every 3 minutes PRN For ANY of the following changes in patient status:  A. RR LESS THAN 10 breaths per minute, B. Oxygen saturation LESS THAN 90%, C. Sedation score of 6  ondansetron Injectable 4 milliGRAM(s) IV Push every 6 hours PRN Nausea    Allergies  morphine (Urticaria)    Review of Systems:  Constitutional: No fever  Eyes: No blurry vision  Neuro: No tremors  HEENT: No pain  Cardiovascular: + chest pain, palpitations  Respiratory: No SOB, no cough  GI: No nausea, vomiting, abdominal pain  : No dysuria  Endocrine: no polyuria, polydipsia  Hem/lymph: no swelling    ALL OTHER SYSTEMS REVIEWED AND NEGATIVE      PHYSICAL EXAM:  VITALS: T(C): 36.9 (03-14-21 @ 12:00)  T(F): 98.4 (03-14-21 @ 12:00), Max: 100.6 (03-14-21 @ 00:00)  HR: 82 (03-14-21 @ 15:07) (81 - 99)  BP: 144/67 (03-14-21 @ 15:07) (121/69 - 144/67)  RR:  (12 - 38)  SpO2:  (88% - 100%)  Wt(kg): 88kg   GENERAL: NAD, well-groomed, well-developed  EYES: No proptosis, no lid lag, anicteric  HEENT:  Atraumatic, Normocephalic, moist mucous membranes  THYROID: Normal size, no palpable nodules, nontender   RESPIRATORY: Clear to auscultation bilaterally; No rales, rhonchi, wheezing  CARDIOVASCULAR: Regular rate and rhythm; No murmurs; no peripheral edema  GI: Soft, nontender, non distended, normal bowel sounds  SKIN: incision from sternotomy covered with gauze, no lesions on feet b/l  NEURO: extraocular movements intact, no tremor  PSYCH: Alert and oriented x 3, reactive affect     POCT Blood Glucose.: 135 mg/dL (03-14-21 @ 17:02)  POCT Blood Glucose.: 147 mg/dL (03-14-21 @ 16:12)  POCT Blood Glucose.: 195 mg/dL (03-14-21 @ 15:07)  POCT Blood Glucose.: 178 mg/dL (03-14-21 @ 14:11)  POCT Blood Glucose.: 177 mg/dL (03-14-21 @ 12:54)  POCT Blood Glucose.: 161 mg/dL (03-14-21 @ 12:05)  POCT Blood Glucose.: 153 mg/dL (03-14-21 @ 11:49)  POCT Blood Glucose.: 145 mg/dL (03-14-21 @ 10:07)  POCT Blood Glucose.: 145 mg/dL (03-14-21 @ 08:58)  POCT Blood Glucose.: 107 mg/dL (03-14-21 @ 08:10)  POCT Blood Glucose.: 110 mg/dL (03-14-21 @ 06:28)  POCT Blood Glucose.: 101 mg/dL (03-14-21 @ 04:01)  POCT Blood Glucose.: 118 mg/dL (03-14-21 @ 01:10)  POCT Blood Glucose.: 144 mg/dL (03-13-21 @ 23:01)  POCT Blood Glucose.: 91 mg/dL (03-13-21 @ 21:47)  POCT Blood Glucose.: 112 mg/dL (03-13-21 @ 20:57)  POCT Blood Glucose.: 123 mg/dL (03-13-21 @ 18:57)  POCT Blood Glucose.: 138 mg/dL (03-13-21 @ 18:16)  POCT Blood Glucose.: 141 mg/dL (03-13-21 @ 16:52)  POCT Blood Glucose.: 148 mg/dL (03-13-21 @ 16:06)  POCT Blood Glucose.: 165 mg/dL (03-13-21 @ 15:10)  POCT Blood Glucose.: 143 mg/dL (03-13-21 @ 14:01)  POCT Blood Glucose.: 129 mg/dL (03-13-21 @ 12:58)  POCT Blood Glucose.: 122 mg/dL (03-13-21 @ 11:52)  POCT Blood Glucose.: 108 mg/dL (03-13-21 @ 11:20)  POCT Blood Glucose.: 107 mg/dL (03-13-21 @ 10:02)  POCT Blood Glucose.: 107 mg/dL (03-13-21 @ 08:56)  POCT Blood Glucose.: 121 mg/dL (03-13-21 @ 07:58)  POCT Blood Glucose.: 131 mg/dL (03-13-21 @ 02:47)  POCT Blood Glucose.: 119 mg/dL (03-13-21 @ 02:00)  POCT Blood Glucose.: 111 mg/dL (03-13-21 @ 01:35)  POCT Blood Glucose.: 131 mg/dL (03-12-21 @ 18:52)  POCT Blood Glucose.: 135 mg/dL (03-12-21 @ 17:49)  POCT Blood Glucose.: 177 mg/dL (03-12-21 @ 15:58)  POCT Blood Glucose.: 285 mg/dL (03-12-21 @ 06:41)  POCT Blood Glucose.: 161 mg/dL (03-11-21 @ 21:20)  POCT Blood Glucose.: 127 mg/dL (03-11-21 @ 17:38)                            9.4    15.96 )-----------( 126      ( 14 Mar 2021 00:40 )             28.6       03-14    139  |  106  |  26<H>  ----------------------------<  136<H>  4.2   |  22  |  1.06    EGFR if : 86  EGFR if non : 74    Ca    8.6      03-14  Mg     2.2     03-14  Phos  2.7     03-14    TPro  6.0  /  Alb  3.9  /  TBili  0.6  /  DBili  x   /  AST  28  /  ALT  18  /  AlkPhos  55  03-14      Thyroid Function Tests:  03-09 @ 10:36 TSH 3.45    03-09 Chol 111 LDL -- HDL 36<L> Trig 101

## 2021-03-14 NOTE — CONSULT NOTE ADULT - ASSESSMENT
65M with history of HTN, T2DM, HLD, hypothyroidism, chronic pancreatitis p/w chest pain. Pt states he has been having CP for past 2 weeks - pain was initially only with exertion, located midsternal/epigastric region, burning pain with occasional radiation to the jaw and associated with DUARTE. Pt went to see cardiologist for these symptoms and TTE showed inferolateral wall hypokinesis and referred to ED for possible cath. Patient admitted for unstable angina. Now s/p CABG 3/12. After CABG patient was on insulin gtt. BG at goal 100-180.  Endocrine consult requested to manage uncontrolled DM2.      uncontrolled DM2  A1c 8.2%  While on insulin gtt q1h FS   FS goal 100-140 inpatient     Hypothyroidism   Clinically euthyroid  TSH wnl 3.45  Continue Lt4 100mcg qam on empty stomach     HTN   goal <130/80  will defer to primary team given heart history    HLD   LDL goal <70  LDL 55  Continue atorvastatin       Chidi Dorantes MD  Endocrine Fellow   Pager  on weekdays 9am- 5pm   Please call  after hours and on weekends 65M with history of HTN, T2DM, HLD, hypothyroidism, chronic pancreatitis p/w chest pain. Pt states he has been having CP for past 2 weeks - pain was initially only with exertion, located midsternal/epigastric region, burning pain with occasional radiation to the jaw and associated with DUARTE. Pt went to see cardiologist for these symptoms and TTE showed inferolateral wall hypokinesis and referred to ED for possible cath. Patient admitted for unstable angina. Now s/p CABG 3/12. After CABG patient was on insulin gtt. BG at goal 100-180.  Currently on insulin gtt and transferred to step down unit.   Endocrine consult requested to manage uncontrolled DM2.      uncontrolled DM2  A1c 8.2%  Patient remains on insulin gtt @6units/hr   FS goal 100-180 while on the floor  Recommend Lantus 40 units qhs tonight 3/14/21 as patient was given Lantus 12units at 10am today. After lantus is given wait two hours to discontinue insulin drip   On 3/15/21 please increase to Lantus 45units qhs.   Recommend Admelog 10units TIDac, patient will likely needed more premeal  Recommend mod correctional scale premeal and qhs     On discharge recommend basal/bolus insulin ideally. Will discuss with patient if he is not willing to do basal/bolus maybe a mixed insulin.   On discharge patient can follow up with  [facility] [address] [place] [phone]    Hypothyroidism   Clinically euthyroid  TSH wnl 3.45  Continue Lt4 100mcg qam on empty stomach     HTN   goal <130/80  will defer to primary team given heart history    HLD   LDL goal <70  LDL 55  Continue atorvastatin     Discussed with primary team   Discussed with Dr Apollo Dorantes MD  Endocrine Fellow   Pager  on weekdays 9am- 5pm   Please call  after hours and on weekends

## 2021-03-14 NOTE — PROGRESS NOTE ADULT - PROBLEM SELECTOR PLAN 1
Transferred to 2C step down  +pacing wires to EPM  follow up AM labs: Keep K>4.0  Mg>2.0	  progressive ambulation  pulmonary toileting/incentive spirometry  restart appropriate home medications

## 2021-03-14 NOTE — PROGRESS NOTE ADULT - SUBJECTIVE AND OBJECTIVE BOX
Step down accept note    VITAL SIGNS  T(F): 99.7  HR: 91  Tele:   BP: 153/76  RR: 18  SpO2: 97%    03-13-21 @ 06:01  -  03-14-21 @ 07:00  --------------------------------------------------------  OUT: 2445 mL / NET: -2445 mL    03-14-21 @ 07:01  -  03-14-21 @ 21:04  --------------------------------------------------------  OUT: 715 mL / NET: -715 mL    LAB                        9.4    15.96 )-----------( 126      ( 14 Mar 2021 00:40 )             28.6   03-14    139  |  106  |  26<H>  ----------------------------<  136<H>  4.2   |  22  |  1.06    Ca    8.6      14 Mar 2021 00:40  Phos  2.7     03-14  Mg     2.2     03-14    TPro  6.0  /  Alb  3.9  /  TBili  0.6  /  DBili  x   /  AST  28  /  ALT  18  /  AlkPhos  55  03-14    CAPILLARY BLOOD GLUCOSE  135 (14 Mar 2021 17:00)  147 (14 Mar 2021 16:00)  195 (14 Mar 2021 15:00)    DIAGNOSTICS   Xray Chest 1 View- PORTABLE-Urgent (Xray Chest 1 View- PORTABLE-Urgent .) (03.14.21 @ 12:20) >  Interval removal of right IJ central venous catheter and right-sided chest tube.  Left-sided chest tube is unchanged in position.  The lungs are clear.  There is no pneumothorax or pleural effusion.  Heart size cannot be accurately assessed in this projection. Sternotomy wires.  No acute osseous abnormality.  IMPRESSION: Interval removal of right IJ central venous catheter.  Status post removal of right chest tube without pneumothorax. No focal consolidation is seen.    MEDICATIONS  aspirin enteric coated 81 milliGRAM(s) Oral daily  atorvastatin 40 milliGRAM(s) Oral at bedtime  famotidine    Tablet 20 milliGRAM(s) Oral daily  heparin   Injectable 5000 Unit(s) SubCutaneous every 8 hours  HYDROmorphone PCA (1 mG/mL) 30 milliLiter(s) PCA Continuous PCA Continuous  insulin glargine Injectable (LANTUS) 40 Unit(s) SubCutaneous at bedtime  insulin lispro (ADMELOG) corrective regimen sliding scale   SubCutaneous three times a day before meals  insulin lispro (ADMELOG) corrective regimen sliding scale   SubCutaneous at bedtime  insulin regular Infusion 3 Unit(s)/Hr IV Continuous <Continuous>  latanoprost 0.005% Ophthalmic Solution 1 Drop(s) Both EYES at bedtime  levothyroxine 100 MICROGram(s) Oral daily  metoclopramide Injectable 10 milliGRAM(s) IV Push every 8 hours  metoprolol tartrate 25 milliGRAM(s) Oral every 8 hours  mupirocin 2% Ointment 1 Application(s) Topical two times a day  polyethylene glycol 3350 17 Gram(s) Oral daily    PHYSICAL EXAM  GEN: No apparent distress, examined in chair  PSYCH: Appropriate, communicative, A+Ox3  NEURO: Non-focal  CARDIAC: S1 S2 RRR without rub or murmur  Edema +1/4  Pacing wires:  Ventricular  setting:  VVI 40/10  PULMONARY:  decreased left base  Abdomen: Soft flat non-tender, non-distended bowel sounds active x 4 LBM:   : clear yellow urine  Skin:  warm dry intact   sternal incision:   covered, clean, dry intact dressing  ACE wraps:  Pain: PCA         Step down accept note    VITAL SIGNS  T(F): 99.7  HR: 91  Tele: SR  BP: 153/76  RR: 18  SpO2: 97%    03-13-21 @ 06:01  -  03-14-21 @ 07:00  --------------------------------------------------------  OUT: 2445 mL / NET: -2445 mL    03-14-21 @ 07:01  -  03-14-21 @ 21:04  --------------------------------------------------------  OUT: 715 mL / NET: -715 mL    LAB                        9.4    15.96 )-----------( 126      ( 14 Mar 2021 00:40 )             28.6   03-14    139  |  106  |  26<H>  ----------------------------<  136<H>  4.2   |  22  |  1.06    Ca    8.6      14 Mar 2021 00:40  Phos  2.7     03-14  Mg     2.2     03-14    TPro  6.0  /  Alb  3.9  /  TBili  0.6  /  DBili  x   /  AST  28  /  ALT  18  /  AlkPhos  55  03-14    CAPILLARY BLOOD GLUCOSE  135 (14 Mar 2021 17:00)  147 (14 Mar 2021 16:00)  195 (14 Mar 2021 15:00)    DIAGNOSTICS   Xray Chest 1 View- PORTABLE-Urgent (Xray Chest 1 View- PORTABLE-Urgent .) (03.14.21 @ 12:20) >  Interval removal of right IJ central venous catheter and right-sided chest tube.  Left-sided chest tube is unchanged in position.  The lungs are clear.  There is no pneumothorax or pleural effusion.  Heart size cannot be accurately assessed in this projection. Sternotomy wires.  No acute osseous abnormality.  IMPRESSION: Interval removal of right IJ central venous catheter.  Status post removal of right chest tube without pneumothorax. No focal consolidation is seen.    MEDICATIONS  aspirin enteric coated 81 milliGRAM(s) Oral daily  atorvastatin 40 milliGRAM(s) Oral at bedtime  famotidine    Tablet 20 milliGRAM(s) Oral daily  heparin   Injectable 5000 Unit(s) SubCutaneous every 8 hours  HYDROmorphone PCA (1 mG/mL) 30 milliLiter(s) PCA Continuous PCA Continuous  insulin glargine Injectable (LANTUS) 40 Unit(s) SubCutaneous at bedtime  insulin lispro (ADMELOG) corrective regimen sliding scale   SubCutaneous three times a day before meals  insulin lispro (ADMELOG) corrective regimen sliding scale   SubCutaneous at bedtime  insulin regular Infusion 3 Unit(s)/Hr IV Continuous <Continuous>  latanoprost 0.005% Ophthalmic Solution 1 Drop(s) Both EYES at bedtime  levothyroxine 100 MICROGram(s) Oral daily  metoclopramide Injectable 10 milliGRAM(s) IV Push every 8 hours  metoprolol tartrate 25 milliGRAM(s) Oral every 8 hours  mupirocin 2% Ointment 1 Application(s) Topical two times a day  polyethylene glycol 3350 17 Gram(s) Oral daily    PHYSICAL EXAM  GEN: No apparent distress, examined in chair  PSYCH: Appropriate, communicative, A+Ox3  NEURO: Non-focal  CARDIAC: S1 S2 RRR without rub or murmur  Edema +1/4  Pacing wires:  Ventricular  setting:  VVI 40/10  PULMONARY:  decreased left base  Abdomen: Soft flat non-tender, non-distended bowel sounds active x 4 LBM:   : clear yellow urine  Skin:  warm dry intact   sternal incision:   covered, clean, dry intact dressing  ACE wraps:  Pain: PCA

## 2021-03-14 NOTE — PROGRESS NOTE ADULT - ASSESSMENT
65M with history of HTN, T2DM, HLD, hypothyroidism, chronic pancreatitis p/w chest pain. Pt states he has been having CP for past 2 weeks - pain was initially only with exertion, located midsternal/epigastric region, burning pain with occasional radiation to the jaw and associated with DUARTE. Pt went to see cardiologist for these symptoms and TTE showed inferolateral wall hypokinesis and referred to ED for possible cath. Patient admitted for unstable angina. Now s/p CABG 3/12.     Hospital course:  3/12: C3L pre-op IABP  3/13: IABP dc'd  3/14: Chest tubes dc'd/ To SDU with PCA pump/Insulin gtt/pacing wires.  Endocrine consult     65M with history of former tobacco use quit 2000, HTN, T2DM, HLD, hypothyroidism, chronic pancreatitis/chronic pain medication use p/w chest pain. Pt states he has been having CP for past 2 weeks - pain was initially only with exertion, located midsternal/epigastric region, burning pain with occasional radiation to the jaw and associated with DUARTE. Pt went to see cardiologist for these symptoms and TTE showed inferolateral wall hypokinesis and referred to ED for possible cath. Patient admitted for unstable angina. Now s/p CABG 3/12.     iSTOP:  Reference #: [id number] Chronic tylenol with codeine usage    Hospital course:  3/9: CICU: Diffuse abd pain: CT A/P chronic pancreatitis left paraduodenal hernia partial small bowel obstruction ? previous bowel resection Gen surg consult: no intervention   3/12: C3L pre-op IABP  3/13: IABP dc'd  3/14: Chest tubes dc'd/ To SDU with PCA pump/Insulin gtt/pacing wires.  Endocrine consult     65M with history of former tobacco use quit 2000, HTN, T2DM, HLD, hypothyroidism, chronic pancreatitis/chronic pain medication use p/w chest pain. Pt states he has been having CP for past 2 weeks - pain was initially only with exertion, located midsternal/epigastric region, burning pain with occasional radiation to the jaw and associated with DUARTE. Pt went to see cardiologist for these symptoms and TTE showed inferolateral wall hypokinesis and referred to ED for possible cath. Patient admitted for unstable angina. Now s/p CABG 3/12.     iSTOP:  Reference #: [id number] Chronic tylenol with codeine usage  EF: pre: 50% post: 65%  A1C: 8.2    Hospital course:  3/9: Admit: Brilinta load. Cath Dr. Hines:  LAD 90%, prox OM 80%, mRCA 95%, RFA  1700: L Fem IABP CICU  3/10: [id number]  3/11: : Diffuse abd pain: CT A/P chronic pancreatitis left paraduodenal hernia partial small bowel obstruction ? previous bowel resection Gen surg consult: no intervention   3/12: C3L pre-op IABP no products extubated within 24 hours   3/13: IABP dc'd  3/14: Chest tubes dc'd/ To SDU with PCA pump/Insulin gtt/A+V pacing wires.  Endocrine consult

## 2021-03-14 NOTE — CONSULT NOTE ADULT - ATTENDING COMMENTS
Pt seen and examined. Agree with A/P. Pt c/o diffuse abdominal pain yesterday that has now resolved. Pt states pain different from usual chronic pancreatitis symptoms. Pt tolerated breakfast and lunch today, hungry for dinner. Denies nausea or vomiting. Pt states has had decreased flatus past 2 days. Benign abdominal exam. CT findings of PSBO not consistent with clinical findings. No surgical intervention at this time. Continue diet as tolerated. Chronic pancreatitis symptoms appear stable at this time.
Pt seen about 2pm today. Agree with above note with addendum: pt with union insurance so he has a high deductible and he has to use a pharmacy in [facility]. The pen is expensive so he uses the vial and syringe. He would like to use [facility] when I told him that he can get a vial for $24.99. His vial costs him $100. He needs both basal and bolus insulin. Can check a c-peptide to assess his pancreatic reserve. tonight give Lantus 45 units sq qhs and increase premeal to 12 units sq tid-ac. Discussed with AALIYAH Vieira.  Pt would also benefit from a consult with BLAKE/NELI Talley. I discussed the case with her and showed her the online PDF CHO counting for [place] diet as the patient eats a typical [place] diet.  Nevin Bustillos MD  Endocrinology Attending  Mondays and Tuesdays 9am-6pm: [phone] (pager)  Other days, night and weekend: [phone]

## 2021-03-14 NOTE — PROGRESS NOTE ADULT - ASSESSMENT
65M with PMH of HTN, T2DM, HLD, hypothyroidism, chronic pancreatitis p/w chest pain x 2 weeks, found with inferolateral wall hypokinesis with inferior TWI on EKG as outpt, admitted for c/f ACS.     CAD/  Unstable angina.   - s/p CABGX3  - postop care    Essential hypertension.   - control    Type 2 diabetes mellitus with hyperglycemia, with long-term current use of insulin.  - ADA diet  - BS control    Hypothyroidism.    - continue Synthroid  - follow TSH.    Chronic pancreatitis.   - chronic abd pain, lipase mildly elevated  - c/t monitor  - c/w pain control.    SBO partial resolved  - surgical eval. No intervention.     HLD (hyperlipidemia).   - high intensity statin.    CTU care     Sriram Flores MD pager [phone]

## 2021-03-15 DIAGNOSIS — R52 PAIN, UNSPECIFIED: ICD-10-CM

## 2021-03-15 DIAGNOSIS — K56.600 PARTIAL INTESTINAL OBSTRUCTION, UNSPECIFIED AS TO CAUSE: ICD-10-CM

## 2021-03-15 DIAGNOSIS — K86.0 ALCOHOL-INDUCED CHRONIC PANCREATITIS: ICD-10-CM

## 2021-03-15 DIAGNOSIS — E03.9 HYPOTHYROIDISM, UNSPECIFIED: ICD-10-CM

## 2021-03-15 DIAGNOSIS — Z95.1 PRESENCE OF AORTOCORONARY BYPASS GRAFT: ICD-10-CM

## 2021-03-15 DIAGNOSIS — Z29.9 ENCOUNTER FOR PROPHYLACTIC MEASURES, UNSPECIFIED: ICD-10-CM

## 2021-03-15 LAB
ALBUMIN SERPL ELPH-MCNC: 3.4 G/DL — SIGNIFICANT CHANGE UP (ref 3.3–5)
ALP SERPL-CCNC: 66 U/L — SIGNIFICANT CHANGE UP (ref 40–120)
ALT FLD-CCNC: 18 U/L — SIGNIFICANT CHANGE UP (ref 10–45)
ANION GAP SERPL CALC-SCNC: 8 MMOL/L — SIGNIFICANT CHANGE UP (ref 5–17)
AST SERPL-CCNC: 22 U/L — SIGNIFICANT CHANGE UP (ref 10–40)
BASOPHILS # BLD AUTO: 0.01 K/UL — SIGNIFICANT CHANGE UP (ref 0–0.2)
BASOPHILS NFR BLD AUTO: 0.1 % — SIGNIFICANT CHANGE UP (ref 0–2)
BILIRUB SERPL-MCNC: 0.8 MG/DL — SIGNIFICANT CHANGE UP (ref 0.2–1.2)
BUN SERPL-MCNC: 19 MG/DL — SIGNIFICANT CHANGE UP (ref 7–23)
CALCIUM SERPL-MCNC: 8.6 MG/DL — SIGNIFICANT CHANGE UP (ref 8.4–10.5)
CHLORIDE SERPL-SCNC: 105 MMOL/L — SIGNIFICANT CHANGE UP (ref 96–108)
CO2 SERPL-SCNC: 23 MMOL/L — SIGNIFICANT CHANGE UP (ref 22–31)
CREAT SERPL-MCNC: 0.9 MG/DL — SIGNIFICANT CHANGE UP (ref 0.5–1.3)
EOSINOPHIL # BLD AUTO: 0.18 K/UL — SIGNIFICANT CHANGE UP (ref 0–0.5)
EOSINOPHIL NFR BLD AUTO: 1.4 % — SIGNIFICANT CHANGE UP (ref 0–6)
GLUCOSE BLDC GLUCOMTR-MCNC: 142 MG/DL — HIGH (ref 70–99)
GLUCOSE BLDC GLUCOMTR-MCNC: 158 MG/DL — HIGH (ref 70–99)
GLUCOSE BLDC GLUCOMTR-MCNC: 161 MG/DL — HIGH (ref 70–99)
GLUCOSE BLDC GLUCOMTR-MCNC: 162 MG/DL — HIGH (ref 70–99)
GLUCOSE SERPL-MCNC: 160 MG/DL — HIGH (ref 70–99)
HCT VFR BLD CALC: 29 % — LOW (ref 39–50)
HGB BLD-MCNC: 9 G/DL — LOW (ref 13–17)
IMM GRANULOCYTES NFR BLD AUTO: 0.5 % — SIGNIFICANT CHANGE UP (ref 0–1.5)
LYMPHOCYTES # BLD AUTO: 2.61 K/UL — SIGNIFICANT CHANGE UP (ref 1–3.3)
LYMPHOCYTES # BLD AUTO: 20.9 % — SIGNIFICANT CHANGE UP (ref 13–44)
MAGNESIUM SERPL-MCNC: 2.5 MG/DL — SIGNIFICANT CHANGE UP (ref 1.6–2.6)
MCHC RBC-ENTMCNC: 27.3 PG — SIGNIFICANT CHANGE UP (ref 27–34)
MCHC RBC-ENTMCNC: 31 GM/DL — LOW (ref 32–36)
MCV RBC AUTO: 87.9 FL — SIGNIFICANT CHANGE UP (ref 80–100)
MONOCYTES # BLD AUTO: 1.13 K/UL — HIGH (ref 0–0.9)
MONOCYTES NFR BLD AUTO: 9 % — SIGNIFICANT CHANGE UP (ref 2–14)
NEUTROPHILS # BLD AUTO: 8.5 K/UL — HIGH (ref 1.8–7.4)
NEUTROPHILS NFR BLD AUTO: 68.1 % — SIGNIFICANT CHANGE UP (ref 43–77)
NRBC # BLD: 0 /100 WBCS — SIGNIFICANT CHANGE UP (ref 0–0)
PLATELET # BLD AUTO: 131 K/UL — LOW (ref 150–400)
POTASSIUM SERPL-MCNC: 4.3 MMOL/L — SIGNIFICANT CHANGE UP (ref 3.5–5.3)
POTASSIUM SERPL-SCNC: 4.3 MMOL/L — SIGNIFICANT CHANGE UP (ref 3.5–5.3)
PROT SERPL-MCNC: 5.9 G/DL — LOW (ref 6–8.3)
RBC # BLD: 3.3 M/UL — LOW (ref 4.2–5.8)
RBC # FLD: 14.5 % — SIGNIFICANT CHANGE UP (ref 10.3–14.5)
SODIUM SERPL-SCNC: 136 MMOL/L — SIGNIFICANT CHANGE UP (ref 135–145)
WBC # BLD: 12.49 K/UL — HIGH (ref 3.8–10.5)
WBC # FLD AUTO: 12.49 K/UL — HIGH (ref 3.8–10.5)

## 2021-03-15 PROCEDURE — 99255 IP/OBS CONSLTJ NEW/EST HI 80: CPT | Mod: GC

## 2021-03-15 PROCEDURE — 71045 X-RAY EXAM CHEST 1 VIEW: CPT | Mod: 26

## 2021-03-15 RX ORDER — METOPROLOL TARTRATE 50 MG
25 TABLET ORAL ONCE
Refills: 0 | Status: COMPLETED | OUTPATIENT
Start: 2021-03-15 | End: 2021-03-15

## 2021-03-15 RX ORDER — HYDROMORPHONE HYDROCHLORIDE 2 MG/ML
2 INJECTION INTRAMUSCULAR; INTRAVENOUS; SUBCUTANEOUS
Refills: 0 | Status: DISCONTINUED | OUTPATIENT
Start: 2021-03-15 | End: 2021-03-17

## 2021-03-15 RX ORDER — HYDROMORPHONE HYDROCHLORIDE 2 MG/ML
0.25 INJECTION INTRAMUSCULAR; INTRAVENOUS; SUBCUTANEOUS EVERY 6 HOURS
Refills: 0 | Status: DISCONTINUED | OUTPATIENT
Start: 2021-03-15 | End: 2021-03-16

## 2021-03-15 RX ORDER — METOPROLOL TARTRATE 50 MG
50 TABLET ORAL EVERY 12 HOURS
Refills: 0 | Status: DISCONTINUED | OUTPATIENT
Start: 2021-03-15 | End: 2021-03-16

## 2021-03-15 RX ORDER — HYDROMORPHONE HYDROCHLORIDE 2 MG/ML
4 INJECTION INTRAMUSCULAR; INTRAVENOUS; SUBCUTANEOUS EVERY 4 HOURS
Refills: 0 | Status: DISCONTINUED | OUTPATIENT
Start: 2021-03-15 | End: 2021-03-17

## 2021-03-15 RX ADMIN — Medication 10 UNIT(S): at 17:21

## 2021-03-15 RX ADMIN — Medication 10 MILLIGRAM(S): at 05:22

## 2021-03-15 RX ADMIN — ATORVASTATIN CALCIUM 40 MILLIGRAM(S): 80 TABLET, FILM COATED ORAL at 21:32

## 2021-03-15 RX ADMIN — HYDROMORPHONE HYDROCHLORIDE 0.25 MILLIGRAM(S): 2 INJECTION INTRAMUSCULAR; INTRAVENOUS; SUBCUTANEOUS at 12:38

## 2021-03-15 RX ADMIN — ONDANSETRON 4 MILLIGRAM(S): 8 TABLET, FILM COATED ORAL at 01:37

## 2021-03-15 RX ADMIN — HYDROMORPHONE HYDROCHLORIDE 4 MILLIGRAM(S): 2 INJECTION INTRAMUSCULAR; INTRAVENOUS; SUBCUTANEOUS at 20:06

## 2021-03-15 RX ADMIN — HEPARIN SODIUM 5000 UNIT(S): 5000 INJECTION INTRAVENOUS; SUBCUTANEOUS at 21:32

## 2021-03-15 RX ADMIN — HYDROMORPHONE HYDROCHLORIDE 30 MILLILITER(S): 2 INJECTION INTRAMUSCULAR; INTRAVENOUS; SUBCUTANEOUS at 07:11

## 2021-03-15 RX ADMIN — Medication 10 MILLIGRAM(S): at 14:05

## 2021-03-15 RX ADMIN — LATANOPROST 1 DROP(S): 0.05 SOLUTION/ DROPS OPHTHALMIC; TOPICAL at 21:32

## 2021-03-15 RX ADMIN — Medication 50 MILLIGRAM(S): at 17:21

## 2021-03-15 RX ADMIN — HYDROMORPHONE HYDROCHLORIDE 0.25 MILLIGRAM(S): 2 INJECTION INTRAMUSCULAR; INTRAVENOUS; SUBCUTANEOUS at 12:08

## 2021-03-15 RX ADMIN — Medication 100 MICROGRAM(S): at 05:22

## 2021-03-15 RX ADMIN — Medication 10 UNIT(S): at 11:58

## 2021-03-15 RX ADMIN — Medication 10 MILLIGRAM(S): at 21:32

## 2021-03-15 RX ADMIN — HYDROMORPHONE HYDROCHLORIDE 0.25 MILLIGRAM(S): 2 INJECTION INTRAMUSCULAR; INTRAVENOUS; SUBCUTANEOUS at 18:19

## 2021-03-15 RX ADMIN — MUPIROCIN 1 APPLICATION(S): 20 OINTMENT TOPICAL at 05:22

## 2021-03-15 RX ADMIN — Medication 25 MILLIGRAM(S): at 05:22

## 2021-03-15 RX ADMIN — HEPARIN SODIUM 5000 UNIT(S): 5000 INJECTION INTRAVENOUS; SUBCUTANEOUS at 05:22

## 2021-03-15 RX ADMIN — MUPIROCIN 1 APPLICATION(S): 20 OINTMENT TOPICAL at 17:22

## 2021-03-15 RX ADMIN — Medication 2: at 17:22

## 2021-03-15 RX ADMIN — HYDROMORPHONE HYDROCHLORIDE 4 MILLIGRAM(S): 2 INJECTION INTRAMUSCULAR; INTRAVENOUS; SUBCUTANEOUS at 09:16

## 2021-03-15 RX ADMIN — Medication 25 MILLIGRAM(S): at 09:16

## 2021-03-15 RX ADMIN — Medication 10 UNIT(S): at 08:50

## 2021-03-15 RX ADMIN — HYDROMORPHONE HYDROCHLORIDE 0.25 MILLIGRAM(S): 2 INJECTION INTRAMUSCULAR; INTRAVENOUS; SUBCUTANEOUS at 18:49

## 2021-03-15 RX ADMIN — HYDROMORPHONE HYDROCHLORIDE 4 MILLIGRAM(S): 2 INJECTION INTRAMUSCULAR; INTRAVENOUS; SUBCUTANEOUS at 14:34

## 2021-03-15 RX ADMIN — HEPARIN SODIUM 5000 UNIT(S): 5000 INJECTION INTRAVENOUS; SUBCUTANEOUS at 14:00

## 2021-03-15 RX ADMIN — POLYETHYLENE GLYCOL 3350 17 GRAM(S): 17 POWDER, FOR SOLUTION ORAL at 11:59

## 2021-03-15 RX ADMIN — CHLORHEXIDINE GLUCONATE 1 APPLICATION(S): 213 SOLUTION TOPICAL at 10:00

## 2021-03-15 RX ADMIN — HYDROMORPHONE HYDROCHLORIDE 4 MILLIGRAM(S): 2 INJECTION INTRAMUSCULAR; INTRAVENOUS; SUBCUTANEOUS at 09:46

## 2021-03-15 RX ADMIN — Medication 81 MILLIGRAM(S): at 11:59

## 2021-03-15 RX ADMIN — INSULIN GLARGINE 45 UNIT(S): 100 INJECTION, SOLUTION SUBCUTANEOUS at 21:39

## 2021-03-15 RX ADMIN — Medication 2: at 08:51

## 2021-03-15 RX ADMIN — HYDROMORPHONE HYDROCHLORIDE 4 MILLIGRAM(S): 2 INJECTION INTRAMUSCULAR; INTRAVENOUS; SUBCUTANEOUS at 14:04

## 2021-03-15 RX ADMIN — HYDROMORPHONE HYDROCHLORIDE 4 MILLIGRAM(S): 2 INJECTION INTRAMUSCULAR; INTRAVENOUS; SUBCUTANEOUS at 21:00

## 2021-03-15 RX ADMIN — FAMOTIDINE 20 MILLIGRAM(S): 10 INJECTION INTRAVENOUS at 11:59

## 2021-03-15 NOTE — PROGRESS NOTE ADULT - ASSESSMENT
65M with history of former tobacco use quit 2000, HTN, T2DM, HLD, hypothyroidism, chronic pancreatitis/chronic pain medication use p/w chest pain. Pt states he has been having CP for past 2 weeks - pain was initially only with exertion, located midsternal/epigastric region, burning pain with occasional radiation to the jaw and associated with DUARTE. Pt went to see cardiologist for these symptoms and TTE showed inferolateral wall hypokinesis and referred to ED for possible cath. Patient admitted for unstable angina. Now s/p CABG 3/12.     iSTOP:  Reference #: [id number] Chronic tylenol with codeine usage  EF: pre: 50% post: 65%  A1C: 8.2    Hospital course:  3/9: Admit: Brilinta load. Cath Dr. Hines:  LAD 90%, prox OM 80%, mRCA 95%, RFA  1700: L Fem IABP CICU  3/10: [id number]  3/11: : Diffuse abd pain: CT A/P chronic pancreatitis left paraduodenal hernia partial small bowel obstruction ? previous bowel resection Gen surg consult: no intervention   3/12: C3L pre-op IABP no products extubated within 24 hours   3/13: IABP dc'd  3/14: Chest tubes dc'd/ To SDU with PCA pump/Insulin gtt/A+V pacing wires.  Endocrine consult  3./15 transition to oral analgesics.  Increase ambulation. Increase beta blocker.   Transfer to floor

## 2021-03-15 NOTE — PROGRESS NOTE ADULT - SUBJECTIVE AND OBJECTIVE BOX
Patient is a 65y old  Male who presents with a chief complaint of chest pain (15 Mar 2021 08:57)      SUBJECTIVE / OVERNIGHT EVENTS: Comfortable without new complaints.   Review of Systems  chest pain no  palpitations no  sob no  nausea no  headache no    MEDICATIONS  (STANDING):  aspirin enteric coated 81 milliGRAM(s) Oral daily  atorvastatin 40 milliGRAM(s) Oral at bedtime  chlorhexidine 2% Cloths 1 Application(s) Topical <User Schedule>  famotidine    Tablet 20 milliGRAM(s) Oral daily  heparin   Injectable 5000 Unit(s) SubCutaneous every 8 hours  insulin glargine Injectable (LANTUS) 45 Unit(s) SubCutaneous at bedtime  insulin lispro (ADMELOG) corrective regimen sliding scale   SubCutaneous three times a day before meals  insulin lispro (ADMELOG) corrective regimen sliding scale   SubCutaneous at bedtime  insulin lispro Injectable (ADMELOG) 10 Unit(s) SubCutaneous three times a day before meals  insulin regular Infusion 3 Unit(s)/Hr (3 mL/Hr) IV Continuous <Continuous>  latanoprost 0.005% Ophthalmic Solution 1 Drop(s) Both EYES at bedtime  levothyroxine 100 MICROGram(s) Oral daily  metoclopramide Injectable 10 milliGRAM(s) IV Push every 8 hours  metoprolol tartrate 50 milliGRAM(s) Oral every 12 hours  mupirocin 2% Ointment 1 Application(s) Topical two times a day  polyethylene glycol 3350 17 Gram(s) Oral daily    MEDICATIONS  (PRN):  BACItracin   Ointment 1 Application(s) Topical every 12 hours PRN Pain/itching  HYDROmorphone   Tablet 2 milliGRAM(s) Oral every 3 hours PRN Moderate Pain (4 - 6)  HYDROmorphone   Tablet 4 milliGRAM(s) Oral every 4 hours PRN Severe Pain (7 - 10)  HYDROmorphone  Injectable 0.25 milliGRAM(s) IV Push every 6 hours PRN break  throughpain  naloxone Injectable 0.1 milliGRAM(s) IV Push every 3 minutes PRN For ANY of the following changes in patient status:  A. RR LESS THAN 10 breaths per minute, B. Oxygen saturation LESS THAN 90%, C. Sedation score of 6  ondansetron Injectable 4 milliGRAM(s) IV Push every 6 hours PRN Nausea      Vital Signs Last 24 Hrs  T(C): 36.7 (15 Mar 2021 15:10), Max: 37.4 (14 Mar 2021 23:11)  T(F): 98 (15 Mar 2021 15:10), Max: 99.4 (14 Mar 2021 23:11)  HR: 93 (15 Mar 2021 17:21) (84 - 93)  BP: 151/71 (15 Mar 2021 17:21) (120/61 - 151/71)  BP(mean): 105 (15 Mar 2021 17:21) (94 - 105)  RR: 18 (15 Mar 2021 17:21) (18 - 18)  SpO2: 94% (15 Mar 2021 17:21) (91% - 98%)    PHYSICAL EXAM:  GENERAL: NAD, well-developed  HEAD:  Atraumatic, Normocephalic  EYES: EOMI, PERRLA, conjunctiva and sclera clear  NECK: Supple, No JVD  CHEST/LUNG: Clear to auscultation bilaterally; No wheeze Sternal wound with clean dressing.  HEART: Regular rate and rhythm; No murmurs, rubs, or gallops  ABDOMEN: Soft, Nontender, Nondistended; Bowel sounds present  EXTREMITIES:  R leg wrapped.  PSYCH: AAOx3  NEUROLOGY: non-focal  SKIN: No rashes or lesions    LABS:                        9.0    12.49 )-----------( 131      ( 15 Mar 2021 07:12 )             29.0     03-15    136  |  105  |  19  ----------------------------<  160<H>  4.3   |  23  |  0.90    Ca    8.6      15 Mar 2021 07:10  Phos  2.7     03-14  Mg     2.5     03-15    TPro  5.9<L>  /  Alb  3.4  /  TBili  0.8  /  DBili  x   /  AST  22  /  ALT  18  /  AlkPhos  66  03-15                RADIOLOGY & ADDITIONAL TESTS:    Imaging Personally Reviewed:    Consultant(s) Notes Reviewed:      Care Discussed with Consultants/Other Providers:

## 2021-03-15 NOTE — PROGRESS NOTE ADULT - SUBJECTIVE AND OBJECTIVE BOX
Cardiovascular Disease Progress Note    Overnight events: No acute events overnight.  no cp/sob/palps/dizziness  Otherwise review of systems negative    Objective Findings:  T(C): 36.7 (03-15-21 @ 07:27), Max: 37.6 (03-14-21 @ 18:02)  HR: 87 (03-15-21 @ 07:27) (82 - 91)  BP: 149/72 (03-15-21 @ 07:27) (121/69 - 153/76)  RR: 18 (03-15-21 @ 07:27) (12 - 33)  SpO2: 96% (03-15-21 @ 07:27) (96% - 100%)  Wt(kg): --  Daily     Daily       Physical Exam:  Gen: NAD  HEENT: EOMI  CV: RRR, normal S1 + S2, no m/r/g  Lungs: CTAB  Abd: soft, non-tender  Ext: No edema    Telemetry: nsr    Laboratory Data:                        9.4    15.96 )-----------( 126      ( 14 Mar 2021 00:40 )             28.6     03-14    139  |  106  |  26<H>  ----------------------------<  136<H>  4.2   |  22  |  1.06    Ca    8.6      14 Mar 2021 00:40  Phos  2.7     03-14  Mg     2.2     03-14    TPro  6.0  /  Alb  3.9  /  TBili  0.6  /  DBili  x   /  AST  28  /  ALT  18  /  AlkPhos  55  03-14              Inpatient Medications:  MEDICATIONS  (STANDING):  aspirin enteric coated 81 milliGRAM(s) Oral daily  atorvastatin 40 milliGRAM(s) Oral at bedtime  chlorhexidine 2% Cloths 1 Application(s) Topical <User Schedule>  famotidine    Tablet 20 milliGRAM(s) Oral daily  heparin   Injectable 5000 Unit(s) SubCutaneous every 8 hours  HYDROmorphone PCA (1 mG/mL) 30 milliLiter(s) PCA Continuous PCA Continuous  insulin glargine Injectable (LANTUS) 45 Unit(s) SubCutaneous at bedtime  insulin lispro (ADMELOG) corrective regimen sliding scale   SubCutaneous three times a day before meals  insulin lispro (ADMELOG) corrective regimen sliding scale   SubCutaneous at bedtime  insulin lispro Injectable (ADMELOG) 10 Unit(s) SubCutaneous three times a day before meals  insulin regular Infusion 3 Unit(s)/Hr (3 mL/Hr) IV Continuous <Continuous>  latanoprost 0.005% Ophthalmic Solution 1 Drop(s) Both EYES at bedtime  levothyroxine 100 MICROGram(s) Oral daily  metoclopramide Injectable 10 milliGRAM(s) IV Push every 8 hours  metoprolol tartrate 25 milliGRAM(s) Oral every 8 hours  mupirocin 2% Ointment 1 Application(s) Topical two times a day  polyethylene glycol 3350 17 Gram(s) Oral daily      Assessment:  -unstable angina  -mvd  -htn  -hld  -dm  -chronic pancreatitis    Recs:  s/p cabg with dr martinez  iabp removed  cw asa and statin  uptitrate metop as hemos allow  pain control     Over 25 minutes spent on total encounter; more than 50% of the visit was spent counseling and/or coordinating care by the attending physician.      Alexei Fraga MD   Cardiovascular Disease  [phone]

## 2021-03-15 NOTE — PROGRESS NOTE ADULT - PROBLEM SELECTOR PLAN 1
Transferred to 2C s  +pacing wires to EPM  follow up AM labs: Keep K>4.0  Mg>2.0	  progressive ambulation  pulmonary toileting/incentive spirometry  restart appropriate home medications

## 2021-03-15 NOTE — PROGRESS NOTE ADULT - SUBJECTIVE AND OBJECTIVE BOX
im ok, pain    VITAL SIGNS    Telemetry:  nsr 80    Vital Signs Last 24 Hrs  T(C): 36.7 (03-15-21 @ 07:27), Max: 37.6 (03-14-21 @ 18:02)  T(F): 98.1 (03-15-21 @ 07:27), Max: 99.7 (03-14-21 @ 18:02)  HR: 87 (03-15-21 @ 07:27) (82 - 91)  BP: 149/72 (03-15-21 @ 07:27) (121/69 - 153/76)  RR: 18 (03-15-21 @ 07:27) (12 - 33)  SpO2: 96% (03-15-21 @ 07:27) (96% - 100%)                   03-14 @ 07:01  -  03-15 @ 07:00  --------------------------------------------------------  IN: 579 mL / OUT: 1215 mL / NET: -636 mL          Daily     Daily             CAPILLARY BLOOD GLUCOSE  135 (14 Mar 2021 17:00)  147 (14 Mar 2021 16:00)  195 (14 Mar 2021 15:00)  178 (14 Mar 2021 14:00)  177 (14 Mar 2021 13:00)  161 (14 Mar 2021 12:00)  153 (14 Mar 2021 11:00)  145 (14 Mar 2021 10:00)  149 (14 Mar 2021 09:00)      POCT Blood Glucose.: 173 mg/dL (14 Mar 2021 23:57)  POCT Blood Glucose.: 242 mg/dL (14 Mar 2021 21:09)  POCT Blood Glucose.: 139 mg/dL (14 Mar 2021 19:03)  POCT Blood Glucose.: 104 mg/dL (14 Mar 2021 18:03)  POCT Blood Glucose.: 135 mg/dL (14 Mar 2021 17:02)  POCT Blood Glucose.: 147 mg/dL (14 Mar 2021 16:12)  POCT Blood Glucose.: 195 mg/dL (14 Mar 2021 15:07)  POCT Blood Glucose.: 178 mg/dL (14 Mar 2021 14:11)  POCT Blood Glucose.: 177 mg/dL (14 Mar 2021 12:54)  POCT Blood Glucose.: 161 mg/dL (14 Mar 2021 12:05)  POCT Blood Glucose.: 153 mg/dL (14 Mar 2021 11:49)  POCT Blood Glucose.: 145 mg/dL (14 Mar 2021 10:07)  POCT Blood Glucose.: 145 mg/dL (14 Mar 2021 08:58)                Pacing Wires        [ x ]   Settings:           off                       Isolated  [  ]    Coumadin    [ ] YES          [x  ]      NO                                   PHYSICAL EXAM        Neurology: alert and oriented x 3, nonfocal, no gross deficits  CV : s1 s2 RRR  Sternal Wound :  CDI , Stable  Lungs: cta  Abdomen: soft, nontender, nondistended, positive bowel sounds, + flatus                    :    voiding       Extremities:   trace   edema   /  -   calve tenderness ,     R leg  incisions cdi          aspirin enteric coated 81 milliGRAM(s) Oral daily  atorvastatin 40 milliGRAM(s) Oral at bedtime  BACItracin   Ointment 1 Application(s) Topical every 12 hours PRN  chlorhexidine 2% Cloths 1 Application(s) Topical <User Schedule>  famotidine    Tablet 20 milliGRAM(s) Oral daily  heparin   Injectable 5000 Unit(s) SubCutaneous every 8 hours  HYDROmorphone   Tablet 2 milliGRAM(s) Oral every 3 hours PRN  HYDROmorphone   Tablet 4 milliGRAM(s) Oral every 4 hours PRN  insulin glargine Injectable (LANTUS) 45 Unit(s) SubCutaneous at bedtime  insulin lispro (ADMELOG) corrective regimen sliding scale   SubCutaneous three times a day before meals  insulin lispro (ADMELOG) corrective regimen sliding scale   SubCutaneous at bedtime  insulin lispro Injectable (ADMELOG) 10 Unit(s) SubCutaneous three times a day before meals  insulin regular Infusion 3 Unit(s)/Hr IV Continuous <Continuous>  latanoprost 0.005% Ophthalmic Solution 1 Drop(s) Both EYES at bedtime  levothyroxine 100 MICROGram(s) Oral daily  metoclopramide Injectable 10 milliGRAM(s) IV Push every 8 hours  metoprolol tartrate 25 milliGRAM(s) Oral once  metoprolol tartrate 50 milliGRAM(s) Oral every 12 hours  mupirocin 2% Ointment 1 Application(s) Topical two times a day  naloxone Injectable 0.1 milliGRAM(s) IV Push every 3 minutes PRN  ondansetron Injectable 4 milliGRAM(s) IV Push every 6 hours PRN  polyethylene glycol 3350 17 Gram(s) Oral daily                    Physical Therapy Rec:   Home  [  ]   Home w/ PT  [  ]  Rehab  [  ]  Discussed with Cardiothoracic Team at AM rounds.

## 2021-03-15 NOTE — PROGRESS NOTE ADULT - PROBLEM SELECTOR PLAN 2
--Controlled Carb diet  --Glucose control with insulin drip on transfer then transitioned to basal bolus +sliding scale as per endocrine recommendations:   --3/15/21 please increase to Lantus 45units qhs.   --Admelog 10units TIDac, patient will likely needed more premeal  --Moderate correctional scale pre-meal and qhs   --Recommend basal/bolus insulin regimen for discharge  --Will discuss with patient if he is not willing to do basal/bolus maybe a mixed insulin.   --On discharge patient can follow up with  [facility] [address] [place] [address] [phone]

## 2021-03-15 NOTE — PROGRESS NOTE ADULT - PROBLEM SELECTOR PLAN 6
--CT scan completed for diffuse abd pain pre-op post IABP insertion  --Gen surg consult: no intervention
--CT scan completed for diffuse abd pain pre-op post IABP insertion  --Gen surg consult: no intervention

## 2021-03-15 NOTE — PROGRESS NOTE ADULT - SUBJECTIVE AND OBJECTIVE BOX
Day 2\1 of Anesthesia Pain Management Service    SUBJECTIVE: Having some pain    Pain Scale Score:	[X] Refer to charted pain scores    THERAPY:    [ ] IV PCA Morphine		        [ ] 5 mg/mL	[ ] 1 mg/mL  [X] IV PCA Hydromorphone	[ ] 5 mg/mL	[X] 1 mg/mL  [ ] IV PCA Fentanyl		        [ ] 50 micrograms/mL    Demand dose: 0.3 mg     Lockout: 6 minutes   Continuous Rate: 0 mg/hr  4 Hour Limit: 4 mg    MEDICATIONS  (STANDING):  aspirin enteric coated 81 milliGRAM(s) Oral daily  atorvastatin 40 milliGRAM(s) Oral at bedtime  chlorhexidine 2% Cloths 1 Application(s) Topical <User Schedule>  famotidine    Tablet 20 milliGRAM(s) Oral daily  heparin   Injectable 5000 Unit(s) SubCutaneous every 8 hours  insulin glargine Injectable (LANTUS) 45 Unit(s) SubCutaneous at bedtime  insulin lispro (ADMELOG) corrective regimen sliding scale   SubCutaneous three times a day before meals  insulin lispro (ADMELOG) corrective regimen sliding scale   SubCutaneous at bedtime  insulin lispro Injectable (ADMELOG) 10 Unit(s) SubCutaneous three times a day before meals  insulin regular Infusion 3 Unit(s)/Hr (3 mL/Hr) IV Continuous <Continuous>  latanoprost 0.005% Ophthalmic Solution 1 Drop(s) Both EYES at bedtime  levothyroxine 100 MICROGram(s) Oral daily  metoclopramide Injectable 10 milliGRAM(s) IV Push every 8 hours  metoprolol tartrate 25 milliGRAM(s) Oral once  metoprolol tartrate 50 milliGRAM(s) Oral every 12 hours  mupirocin 2% Ointment 1 Application(s) Topical two times a day  polyethylene glycol 3350 17 Gram(s) Oral daily    MEDICATIONS  (PRN):  BACItracin   Ointment 1 Application(s) Topical every 12 hours PRN Pain/itching  HYDROmorphone   Tablet 2 milliGRAM(s) Oral every 3 hours PRN Moderate Pain (4 - 6)  HYDROmorphone   Tablet 4 milliGRAM(s) Oral every 4 hours PRN Severe Pain (7 - 10)  naloxone Injectable 0.1 milliGRAM(s) IV Push every 3 minutes PRN For ANY of the following changes in patient status:  A. RR LESS THAN 10 breaths per minute, B. Oxygen saturation LESS THAN 90%, C. Sedation score of 6  ondansetron Injectable 4 milliGRAM(s) IV Push every 6 hours PRN Nausea      OBJECTIVE:    Sedation Score:	[ X] Alert	 [ ] Drowsy 	[ ] Arousable	[ ] Asleep	[ ] Unresponsive    Side Effects:	[X ] None	[ ] Nausea	[ ] Vomiting	[ ] Pruritus  		[ ] Other:    Vital Signs Last 24 Hrs  T(C): 36.7 (15 Mar 2021 07:27), Max: 37.6 (14 Mar 2021 18:02)  T(F): 98.1 (15 Mar 2021 07:27), Max: 99.7 (14 Mar 2021 18:02)  HR: 87 (15 Mar 2021 07:27) (82 - 91)  BP: 149/72 (15 Mar 2021 07:27) (121/69 - 153/76)  BP(mean): 94 (15 Mar 2021 02:57) (88 - 104)  RR: 18 (15 Mar 2021 07:27) (12 - 33)  SpO2: 96% (15 Mar 2021 07:27) (96% - 100%)    ASSESSMENT/ PLAN    Therapy to  be:               [  ] Continued   [X ] Discontinued   [ X] Changed to PRN Analgesics    Documentation and Verification of current medications:   [X] Done	[ ] Not done, not eligible    Comments: Endorsing abdominal pain. PCA use limited, 1x/hr. PCA d\c'd by surgical service.  Transitioned to prn analgesics

## 2021-03-15 NOTE — PROGRESS NOTE ADULT - ASSESSMENT
65M with PMH of HTN, T2DM, HLD, hypothyroidism, chronic pancreatitis p/w chest pain x 2 weeks, found with inferolateral wall hypokinesis with inferior TWI on EKG as outpt, admitted for c/f ACS.     CAD/  Unstable angina.   - s/p CABGX3  - postop care    Essential hypertension.   - control    Type 2 diabetes mellitus with hyperglycemia, with long-term current use of insulin.  - ADA diet  - BS control    Hypothyroidism.    - continue Synthroid  - follow TSH.    Chronic pancreatitis.   - chronic abd pain, lipase mildly elevated  - c/t monitor  - c/w pain control.    SBO partial resolved  - surgical eval. No intervention.     HLD (hyperlipidemia).   - high intensity statin.    PT     Sriram Flores MD pager [phone]

## 2021-03-16 LAB
ALBUMIN SERPL ELPH-MCNC: 3.3 G/DL — SIGNIFICANT CHANGE UP (ref 3.3–5)
ALP SERPL-CCNC: 82 U/L — SIGNIFICANT CHANGE UP (ref 40–120)
ALT FLD-CCNC: 33 U/L — SIGNIFICANT CHANGE UP (ref 10–45)
ANION GAP SERPL CALC-SCNC: 9 MMOL/L — SIGNIFICANT CHANGE UP (ref 5–17)
AST SERPL-CCNC: 38 U/L — SIGNIFICANT CHANGE UP (ref 10–40)
BASOPHILS # BLD AUTO: 0.05 K/UL — SIGNIFICANT CHANGE UP (ref 0–0.2)
BASOPHILS NFR BLD AUTO: 0.4 % — SIGNIFICANT CHANGE UP (ref 0–2)
BILIRUB SERPL-MCNC: 0.8 MG/DL — SIGNIFICANT CHANGE UP (ref 0.2–1.2)
BUN SERPL-MCNC: 18 MG/DL — SIGNIFICANT CHANGE UP (ref 7–23)
CALCIUM SERPL-MCNC: 8.8 MG/DL — SIGNIFICANT CHANGE UP (ref 8.4–10.5)
CHLORIDE SERPL-SCNC: 104 MMOL/L — SIGNIFICANT CHANGE UP (ref 96–108)
CO2 SERPL-SCNC: 25 MMOL/L — SIGNIFICANT CHANGE UP (ref 22–31)
CREAT SERPL-MCNC: 0.92 MG/DL — SIGNIFICANT CHANGE UP (ref 0.5–1.3)
EOSINOPHIL # BLD AUTO: 0.63 K/UL — HIGH (ref 0–0.5)
EOSINOPHIL NFR BLD AUTO: 4.7 % — SIGNIFICANT CHANGE UP (ref 0–6)
GLUCOSE BLDC GLUCOMTR-MCNC: 126 MG/DL — HIGH (ref 70–99)
GLUCOSE BLDC GLUCOMTR-MCNC: 170 MG/DL — HIGH (ref 70–99)
GLUCOSE BLDC GLUCOMTR-MCNC: 187 MG/DL — HIGH (ref 70–99)
GLUCOSE BLDC GLUCOMTR-MCNC: 78 MG/DL — SIGNIFICANT CHANGE UP (ref 70–99)
GLUCOSE SERPL-MCNC: 131 MG/DL — HIGH (ref 70–99)
HCT VFR BLD CALC: 29.1 % — LOW (ref 39–50)
HGB BLD-MCNC: 9.5 G/DL — LOW (ref 13–17)
IMM GRANULOCYTES NFR BLD AUTO: 0.7 % — SIGNIFICANT CHANGE UP (ref 0–1.5)
LYMPHOCYTES # BLD AUTO: 2.72 K/UL — SIGNIFICANT CHANGE UP (ref 1–3.3)
LYMPHOCYTES # BLD AUTO: 20.3 % — SIGNIFICANT CHANGE UP (ref 13–44)
MAGNESIUM SERPL-MCNC: 2.5 MG/DL — SIGNIFICANT CHANGE UP (ref 1.6–2.6)
MCHC RBC-ENTMCNC: 28.2 PG — SIGNIFICANT CHANGE UP (ref 27–34)
MCHC RBC-ENTMCNC: 32.6 GM/DL — SIGNIFICANT CHANGE UP (ref 32–36)
MCV RBC AUTO: 86.4 FL — SIGNIFICANT CHANGE UP (ref 80–100)
MONOCYTES # BLD AUTO: 1.05 K/UL — HIGH (ref 0–0.9)
MONOCYTES NFR BLD AUTO: 7.9 % — SIGNIFICANT CHANGE UP (ref 2–14)
NEUTROPHILS # BLD AUTO: 8.83 K/UL — HIGH (ref 1.8–7.4)
NEUTROPHILS NFR BLD AUTO: 66 % — SIGNIFICANT CHANGE UP (ref 43–77)
NRBC # BLD: 0 /100 WBCS — SIGNIFICANT CHANGE UP (ref 0–0)
PHOSPHATE SERPL-MCNC: 1.6 MG/DL — LOW (ref 2.5–4.5)
PLATELET # BLD AUTO: 190 K/UL — SIGNIFICANT CHANGE UP (ref 150–400)
POTASSIUM SERPL-MCNC: 4.3 MMOL/L — SIGNIFICANT CHANGE UP (ref 3.5–5.3)
POTASSIUM SERPL-SCNC: 4.3 MMOL/L — SIGNIFICANT CHANGE UP (ref 3.5–5.3)
PROT SERPL-MCNC: 6 G/DL — SIGNIFICANT CHANGE UP (ref 6–8.3)
RBC # BLD: 3.37 M/UL — LOW (ref 4.2–5.8)
RBC # FLD: 14.3 % — SIGNIFICANT CHANGE UP (ref 10.3–14.5)
SODIUM SERPL-SCNC: 138 MMOL/L — SIGNIFICANT CHANGE UP (ref 135–145)
WBC # BLD: 13.37 K/UL — HIGH (ref 3.8–10.5)
WBC # FLD AUTO: 13.37 K/UL — HIGH (ref 3.8–10.5)

## 2021-03-16 PROCEDURE — 99232 SBSQ HOSP IP/OBS MODERATE 35: CPT

## 2021-03-16 RX ORDER — METOPROLOL TARTRATE 50 MG
75 TABLET ORAL
Refills: 0 | Status: DISCONTINUED | OUTPATIENT
Start: 2021-03-16 | End: 2021-03-17

## 2021-03-16 RX ORDER — METOPROLOL TARTRATE 50 MG
25 TABLET ORAL ONCE
Refills: 0 | Status: COMPLETED | OUTPATIENT
Start: 2021-03-16 | End: 2021-03-16

## 2021-03-16 RX ORDER — ACETAMINOPHEN 500 MG
1000 TABLET ORAL ONCE
Refills: 0 | Status: DISCONTINUED | OUTPATIENT
Start: 2021-03-16 | End: 2021-03-17

## 2021-03-16 RX ORDER — LISINOPRIL 2.5 MG/1
5 TABLET ORAL DAILY
Refills: 0 | Status: DISCONTINUED | OUTPATIENT
Start: 2021-03-16 | End: 2021-03-17

## 2021-03-16 RX ORDER — SORBITOL SOLUTION 70 %
15 SOLUTION, ORAL MISCELLANEOUS ONCE
Refills: 0 | Status: COMPLETED | OUTPATIENT
Start: 2021-03-16 | End: 2021-03-16

## 2021-03-16 RX ADMIN — HYDROMORPHONE HYDROCHLORIDE 4 MILLIGRAM(S): 2 INJECTION INTRAMUSCULAR; INTRAVENOUS; SUBCUTANEOUS at 06:13

## 2021-03-16 RX ADMIN — Medication 25 MILLIGRAM(S): at 09:11

## 2021-03-16 RX ADMIN — LATANOPROST 1 DROP(S): 0.05 SOLUTION/ DROPS OPHTHALMIC; TOPICAL at 21:24

## 2021-03-16 RX ADMIN — HYDROMORPHONE HYDROCHLORIDE 4 MILLIGRAM(S): 2 INJECTION INTRAMUSCULAR; INTRAVENOUS; SUBCUTANEOUS at 17:15

## 2021-03-16 RX ADMIN — Medication 81 MILLIGRAM(S): at 09:10

## 2021-03-16 RX ADMIN — Medication 10 UNIT(S): at 17:00

## 2021-03-16 RX ADMIN — Medication 2: at 16:59

## 2021-03-16 RX ADMIN — CHLORHEXIDINE GLUCONATE 1 APPLICATION(S): 213 SOLUTION TOPICAL at 08:27

## 2021-03-16 RX ADMIN — HEPARIN SODIUM 5000 UNIT(S): 5000 INJECTION INTRAVENOUS; SUBCUTANEOUS at 21:20

## 2021-03-16 RX ADMIN — HYDROMORPHONE HYDROCHLORIDE 4 MILLIGRAM(S): 2 INJECTION INTRAMUSCULAR; INTRAVENOUS; SUBCUTANEOUS at 11:52

## 2021-03-16 RX ADMIN — HYDROMORPHONE HYDROCHLORIDE 4 MILLIGRAM(S): 2 INJECTION INTRAMUSCULAR; INTRAVENOUS; SUBCUTANEOUS at 12:40

## 2021-03-16 RX ADMIN — INSULIN GLARGINE 45 UNIT(S): 100 INJECTION, SOLUTION SUBCUTANEOUS at 21:42

## 2021-03-16 RX ADMIN — HYDROMORPHONE HYDROCHLORIDE 4 MILLIGRAM(S): 2 INJECTION INTRAMUSCULAR; INTRAVENOUS; SUBCUTANEOUS at 16:24

## 2021-03-16 RX ADMIN — Medication 2: at 11:39

## 2021-03-16 RX ADMIN — Medication 10 MILLIGRAM(S): at 06:09

## 2021-03-16 RX ADMIN — HYDROMORPHONE HYDROCHLORIDE 4 MILLIGRAM(S): 2 INJECTION INTRAMUSCULAR; INTRAVENOUS; SUBCUTANEOUS at 06:46

## 2021-03-16 RX ADMIN — HYDROMORPHONE HYDROCHLORIDE 4 MILLIGRAM(S): 2 INJECTION INTRAMUSCULAR; INTRAVENOUS; SUBCUTANEOUS at 00:36

## 2021-03-16 RX ADMIN — Medication 100 MICROGRAM(S): at 06:10

## 2021-03-16 RX ADMIN — LISINOPRIL 5 MILLIGRAM(S): 2.5 TABLET ORAL at 09:10

## 2021-03-16 RX ADMIN — HYDROMORPHONE HYDROCHLORIDE 0.25 MILLIGRAM(S): 2 INJECTION INTRAMUSCULAR; INTRAVENOUS; SUBCUTANEOUS at 02:00

## 2021-03-16 RX ADMIN — MUPIROCIN 1 APPLICATION(S): 20 OINTMENT TOPICAL at 06:09

## 2021-03-16 RX ADMIN — HYDROMORPHONE HYDROCHLORIDE 0.25 MILLIGRAM(S): 2 INJECTION INTRAMUSCULAR; INTRAVENOUS; SUBCUTANEOUS at 09:00

## 2021-03-16 RX ADMIN — Medication 10 UNIT(S): at 08:05

## 2021-03-16 RX ADMIN — Medication 15 MILLILITER(S): at 16:59

## 2021-03-16 RX ADMIN — ATORVASTATIN CALCIUM 40 MILLIGRAM(S): 80 TABLET, FILM COATED ORAL at 21:21

## 2021-03-16 RX ADMIN — Medication 50 MILLIGRAM(S): at 06:10

## 2021-03-16 RX ADMIN — HEPARIN SODIUM 5000 UNIT(S): 5000 INJECTION INTRAVENOUS; SUBCUTANEOUS at 06:09

## 2021-03-16 RX ADMIN — HYDROMORPHONE HYDROCHLORIDE 0.25 MILLIGRAM(S): 2 INJECTION INTRAMUSCULAR; INTRAVENOUS; SUBCUTANEOUS at 08:19

## 2021-03-16 RX ADMIN — ONDANSETRON 4 MILLIGRAM(S): 8 TABLET, FILM COATED ORAL at 19:36

## 2021-03-16 RX ADMIN — Medication 75 MILLIGRAM(S): at 17:10

## 2021-03-16 RX ADMIN — HYDROMORPHONE HYDROCHLORIDE 0.25 MILLIGRAM(S): 2 INJECTION INTRAMUSCULAR; INTRAVENOUS; SUBCUTANEOUS at 01:45

## 2021-03-16 RX ADMIN — Medication 10 UNIT(S): at 11:38

## 2021-03-16 RX ADMIN — HEPARIN SODIUM 5000 UNIT(S): 5000 INJECTION INTRAVENOUS; SUBCUTANEOUS at 13:53

## 2021-03-16 RX ADMIN — FAMOTIDINE 20 MILLIGRAM(S): 10 INJECTION INTRAVENOUS at 09:10

## 2021-03-16 RX ADMIN — HYDROMORPHONE HYDROCHLORIDE 4 MILLIGRAM(S): 2 INJECTION INTRAMUSCULAR; INTRAVENOUS; SUBCUTANEOUS at 01:05

## 2021-03-16 NOTE — PROGRESS NOTE ADULT - PROBLEM SELECTOR PLAN 4
--CT scan completed for diffuse abd pain pre-op post IABP insertion  --Gen surg consult: no intervention
Clinically euthyroid  TSH wnl 3.45  Continue Lt4 100mcg qam on empty stomach     discussed w/pt and CT surgery team  pager: [phone]   office:  [phone] (M-F 9a-5pm)               [phone] (nights/weekends)     -I spent a total time of 26 mins with the patient at bedside/on unit of which more than 50% of time was spent on counseling/coordination of care.
continue synthroid
continue synthroid

## 2021-03-16 NOTE — PROGRESS NOTE ADULT - PROBLEM SELECTOR PROBLEM 4
Acquired hypothyroidism
Hypothyroidism, unspecified type
Small bowel obstruction, partial
Acquired hypothyroidism

## 2021-03-16 NOTE — PROGRESS NOTE ADULT - SUBJECTIVE AND OBJECTIVE BOX
VITAL SIGNS    Telemetry:    sr  80  Vital Signs Last 24 Hrs  T(C): 36.8 (03-16-21 @ 05:50), Max: 36.8 (03-15-21 @ 11:10)  T(F): 98.2 (03-16-21 @ 05:50), Max: 98.3 (03-15-21 @ 11:10)  HR: 89 (03-16-21 @ 05:50) (83 - 93)  BP: 154/70 (03-16-21 @ 05:50) (120/61 - 157/75)  RR: 16 (03-16-21 @ 05:50) (16 - 19)  SpO2: 93% (03-16-21 @ 05:50) (91% - 96%)                   Daily     Daily       Bilirubin Total, Serum: 0.8 mg/dL (03-15 @ 07:10)    CAPILLARY BLOOD GLUCOSE      POCT Blood Glucose.: 158 mg/dL (15 Mar 2021 21:30)  POCT Blood Glucose.: 161 mg/dL (15 Mar 2021 16:40)  POCT Blood Glucose.: 142 mg/dL (15 Mar 2021 11:39)  POCT Blood Glucose.: 162 mg/dL (15 Mar 2021 08:47)        Pacing Wires        [  ]   Settings:                                  Isolated  [  ]                       PHYSICAL EXAM    Neurology: alert and oriented x 3, moves all extremities with no defecits  CV :  RRR  Sternal Wound :  CDI , Stable  Lungs:   CTA B/L  Abdomen: soft, nontender, nondistended, positive bowel sounds, last bowel movement   Extremities:                                            VITAL SIGNS    Telemetry:    sr  80  Vital Signs Last 24 Hrs  T(C): 36.8 (03-16-21 @ 05:50), Max: 36.8 (03-15-21 @ 11:10)  T(F): 98.2 (03-16-21 @ 05:50), Max: 98.3 (03-15-21 @ 11:10)  HR: 89 (03-16-21 @ 05:50) (83 - 93)  BP: 154/70 (03-16-21 @ 05:50) (120/61 - 157/75)  RR: 16 (03-16-21 @ 05:50) (16 - 19)  SpO2: 93% (03-16-21 @ 05:50) (91% - 96%)                   Daily     Daily       Bilirubin Total, Serum: 0.8 mg/dL (03-15 @ 07:10)    CAPILLARY BLOOD GLUCOSE      POCT Blood Glucose.: 158 mg/dL (15 Mar 2021 21:30)  POCT Blood Glucose.: 161 mg/dL (15 Mar 2021 16:40)  POCT Blood Glucose.: 142 mg/dL (15 Mar 2021 11:39)  POCT Blood Glucose.: 162 mg/dL (15 Mar 2021 08:47)                        PHYSICAL EXAM    Neurology: alert and oriented x 3, moves all extremities with no defecits  CV :  RRR  Sternal Wound :  CDI , Stable  Lungs:   CTA B/L  Abdomen: soft, nontender, nondistended, positive bowel sounds, last bowel movement   3/15  Extremities:     trace pedal edema

## 2021-03-16 NOTE — PROGRESS NOTE ADULT - SUBJECTIVE AND OBJECTIVE BOX
Diabetes Follow up note:    Chief complaint: T2DM    Interval Hx: BG values mostly at goal on present basal/bolus regimen. Pt seen at bedside. Pt reports eating well. Upset he is not going home today. Adamant that he is not interested in using a different insulin regimen at home.     Review of Systems:  General: denies pain  GI: Tolerating POs. Denies N/V/D/Abd pain  CV: Denies CP/SOB  ENDO: No S&Sx of hypoglycemia    MEDS:  atorvastatin 40 milliGRAM(s) Oral at bedtime  insulin glargine Injectable (LANTUS) 45 Unit(s) SubCutaneous at bedtime  insulin lispro (ADMELOG) corrective regimen sliding scale   SubCutaneous three times a day before meals  insulin lispro (ADMELOG) corrective regimen sliding scale   SubCutaneous at bedtime  insulin lispro Injectable (ADMELOG) 10 Unit(s) SubCutaneous three times a day before meals  levothyroxine 100 MICROGram(s) Oral daily      Allergies    morphine (Urticaria)      PE:   General: Male sitting in chair. NAD.   Vital Signs Last 24 Hrs  T(C): 36.8 (16 Mar 2021 11:11), Max: 36.9 (16 Mar 2021 09:00)  T(F): 98.3 (16 Mar 2021 11:11), Max: 98.4 (16 Mar 2021 09:00)  HR: 80 (16 Mar 2021 11:11) (78 - 93)  BP: 150/75 (16 Mar 2021 11:11) (115/62 - 157/75)  BP(mean): 95 (15 Mar 2021 19:20) (95 - 105)  RR: 18 (16 Mar 2021 11:11) (16 - 19)  SpO2: 97% (16 Mar 2021 11:11) (92% - 97%)  Abd: Soft, NT,ND,   Extremities: Warm  Neuro: A&O X3    LABS:  POCT Blood Glucose.: 187 mg/dL (03-16-21 @ 11:17)  POCT Blood Glucose.: 126 mg/dL (03-16-21 @ 07:19)  POCT Blood Glucose.: 158 mg/dL (03-15-21 @ 21:30)  POCT Blood Glucose.: 161 mg/dL (03-15-21 @ 16:40)  POCT Blood Glucose.: 142 mg/dL (03-15-21 @ 11:39)  POCT Blood Glucose.: 162 mg/dL (03-15-21 @ 08:47)  POCT Blood Glucose.: 173 mg/dL (03-14-21 @ 23:57)  POCT Blood Glucose.: 242 mg/dL (03-14-21 @ 21:09)  POCT Blood Glucose.: 139 mg/dL (03-14-21 @ 19:03)  POCT Blood Glucose.: 104 mg/dL (03-14-21 @ 18:03)  POCT Blood Glucose.: 135 mg/dL (03-14-21 @ 17:02)  POCT Blood Glucose.: 147 mg/dL (03-14-21 @ 16:12)  POCT Blood Glucose.: 195 mg/dL (03-14-21 @ 15:07)  POCT Blood Glucose.: 178 mg/dL (03-14-21 @ 14:11)  POCT Blood Glucose.: 177 mg/dL (03-14-21 @ 12:54)  POCT Blood Glucose.: 161 mg/dL (03-14-21 @ 12:05)  POCT Blood Glucose.: 153 mg/dL (03-14-21 @ 11:49)  POCT Blood Glucose.: 145 mg/dL (03-14-21 @ 10:07)  POCT Blood Glucose.: 145 mg/dL (03-14-21 @ 08:58)  POCT Blood Glucose.: 107 mg/dL (03-14-21 @ 08:10)  POCT Blood Glucose.: 110 mg/dL (03-14-21 @ 06:28)  POCT Blood Glucose.: 101 mg/dL (03-14-21 @ 04:01)  POCT Blood Glucose.: 118 mg/dL (03-14-21 @ 01:10)  POCT Blood Glucose.: 144 mg/dL (03-13-21 @ 23:01)  POCT Blood Glucose.: 91 mg/dL (03-13-21 @ 21:47)  POCT Blood Glucose.: 112 mg/dL (03-13-21 @ 20:57)  POCT Blood Glucose.: 123 mg/dL (03-13-21 @ 18:57)  POCT Blood Glucose.: 138 mg/dL (03-13-21 @ 18:16)  POCT Blood Glucose.: 141 mg/dL (03-13-21 @ 16:52)  POCT Blood Glucose.: 148 mg/dL (03-13-21 @ 16:06)                            9.5    13.37 )-----------( 190      ( 16 Mar 2021 06:31 )             29.1       03-16    138  |  104  |  18  ----------------------------<  131<H>  4.3   |  25  |  0.92    Ca    8.8      16 Mar 2021 06:32  Phos  1.6     03-16  Mg     2.5     03-16    TPro  6.0  /  Alb  3.3  /  TBili  0.8  /  DBili  x   /  AST  38  /  ALT  33  /  AlkPhos  82  03-16      Thyroid Function Tests:  03-09 @ 10:36 TSH 3.45 FreeT4 -- T3 -- Anti TPO -- Anti Thyroglobulin Ab -- TSI --      A1C with Estimated Average Glucose Result: 8.2 % (03-09-21 @ 10:38)          Contact number: enrike [phone] or [phone]

## 2021-03-16 NOTE — PROGRESS NOTE ADULT - PROBLEM SELECTOR PLAN 2
Lantus  --Admelog TIDac, patient will likely needed more preme  --Recommend basal/bolus insulin regimen for discharge  --On discharge patient can follow up with  [facility] [address] [place] [address] [phone] Lantus  --Admelog TIDac,  --Recommend basal/bolus insulin regimen for discharge  --On discharge patient can follow up with  [facility] [address] [place] [address] [phone]

## 2021-03-16 NOTE — PROGRESS NOTE ADULT - PROBLEM SELECTOR PROBLEM 1
Type 2 diabetes mellitus with hyperglycemia, with long-term current use of insulin
S/P CABG x 3

## 2021-03-16 NOTE — PROGRESS NOTE ADULT - PROBLEM SELECTOR PROBLEM 3
Alcohol-induced chronic pancreatitis
Hypertension, unspecified type
Pain managed using patient-controlled analgesia (PCA)
Pain managed using patient-controlled analgesia (PCA)

## 2021-03-16 NOTE — PROGRESS NOTE ADULT - ASSESSMENT
65M with history of former tobacco use quit 2000, HTN, T2DM, HLD, hypothyroidism, chronic pancreatitis/chronic pain medication use p/w chest pain. Pt states he has been having CP for past 2 weeks - pain was initially only with exertion, located midsternal/epigastric region, burning pain with occasional radiation to the jaw and associated with DUARTE. Pt went to see cardiologist for these symptoms and TTE showed inferolateral wall hypokinesis and referred to ED for possible cath. Patient admitted for unstable angina. Now s/p CABG 3/12.     iSTOP:  Reference #: [id number] Chronic tylenol with codeine usage  EF: pre: 50% post: 65%  A1C: 8.2    Hospital course:  3/9: Admit: Brilinta load. Cath Dr. Hines:  LAD 90%, prox OM 80%, mRCA 95%, RFA  1700: L Fem IABP CICU  3/10: [id number]  3/11: : Diffuse abd pain: CT A/P chronic pancreatitis left paraduodenal hernia partial small bowel obstruction ? previous bowel resection Gen surg consult: no intervention   3/12: C3L pre-op IABP no products extubated within 24 hours   3/13: IABP dc'd  3/14: Chest tubes dc'd/ To SDU with PCA pump/Insulin gtt/A+V pacing wires.  Endocrine consult  3./15 transition to oral analgesics.  Increase ambulation. Increase beta blocker.   Transfer to floor  3/16     65M with history of former tobacco use quit 2000, HTN, T2DM, HLD, hypothyroidism, chronic pancreatitis/chronic pain medication use p/w chest pain. Pt states he has been having CP for past 2 weeks - pain was initially only with exertion, located midsternal/epigastric region, burning pain with occasional radiation to the jaw and associated with DUARTE. Pt went to see cardiologist for these symptoms and TTE showed inferolateral wall hypokinesis and referred to ED for possible cath. Patient admitted for unstable angina. Now s/p CABG 3/12.     iSTOP:  Reference #: [id number] Chronic tylenol with codeine usage  EF: pre: 50% post: 65%  A1C: 8.2    Hospital course:  3/9: Admit: Brilinta load. Cath Dr. Hines:  LAD 90%, prox OM 80%, mRCA 95%, RFA  1700: L Fem IABP CICU  3/10: [id number]  3/11: : Diffuse abd pain: CT A/P chronic pancreatitis left paraduodenal hernia partial small bowel obstruction ? previous bowel resection Gen surg consult: no intervention   3/12: C3L pre-op IABP no products extubated within 24 hours   3/13: IABP dc'd  3/14: Chest tubes dc'd/ To SDU with PCA pump/Insulin gtt/A+V pacing wires.  Endocrine consult  3./15 transition to oral analgesics.  Increase ambulation. Increase beta blocker.   Transfer to floor  3/16   pulled pw,   OOB to chair,  vss     65M with history of former tobacco use quit 2000, HTN, T2DM, HLD, hypothyroidism, chronic pancreatitis/chronic pain medication use p/w chest pain. Pt states he has been having CP for past 2 weeks - pain was initially only with exertion, located midsternal/epigastric region, burning pain with occasional radiation to the jaw and associated with DUARTE. Pt went to see cardiologist for these symptoms and TTE showed inferolateral wall hypokinesis and referred to ED for possible cath. Patient admitted for unstable angina. Now s/p CABG 3/12.     iSTOP:  Reference #: [id number] Chronic tylenol with codeine usage  EF: pre: 50% post: 65%  A1C: 8.2    Hospital course:  3/9: Admit: Brilinta load. Cath Dr. Hines:  LAD 90%, prox OM 80%, mRCA 95%, RFA  1700: L Fem IABP CICU  3/10: [id number]  3/11: : Diffuse abd pain: CT A/P chronic pancreatitis left paraduodenal hernia partial small bowel obstruction ? previous bowel resection Gen surg consult: no intervention   3/12: C3L pre-op IABP no products extubated within 24 hours   3/13: IABP dc'd  3/14: Chest tubes dc'd/ To SDU with PCA pump/Insulin gtt/A+V pacing wires.  Endocrine consult  3./15 transition to oral analgesics.  Increase ambulation. Increase beta blocker.   Transfer to floor  3/16   pulled pw,   OOB to chair,  vss,  lopressor increased to 75 q12   added lisinopril   5 mg for HTN.

## 2021-03-16 NOTE — PROGRESS NOTE ADULT - PROBLEM SELECTOR PLAN 3
-BP management per CT surgery
Chronic use of tylenol #3
Followed by pain management  Pain controlled on current regimen
Followed by pain management  Pain controlled on current regimen

## 2021-03-16 NOTE — PROGRESS NOTE ADULT - SUBJECTIVE AND OBJECTIVE BOX
Patient is a 65y old  Male who presents with a chief complaint of chest pain (16 Mar 2021 15:27)      SUBJECTIVE / OVERNIGHT EVENTS: No new complaints.   Review of Systems  chest pain no  palpitations no  sob no  nausea no  headache no    MEDICATIONS  (STANDING):  acetaminophen  IVPB .. 1000 milliGRAM(s) IV Intermittent once  aspirin enteric coated 81 milliGRAM(s) Oral daily  atorvastatin 40 milliGRAM(s) Oral at bedtime  chlorhexidine 2% Cloths 1 Application(s) Topical <User Schedule>  famotidine    Tablet 20 milliGRAM(s) Oral daily  heparin   Injectable 5000 Unit(s) SubCutaneous every 8 hours  insulin glargine Injectable (LANTUS) 45 Unit(s) SubCutaneous at bedtime  insulin lispro (ADMELOG) corrective regimen sliding scale   SubCutaneous three times a day before meals  insulin lispro (ADMELOG) corrective regimen sliding scale   SubCutaneous at bedtime  insulin lispro Injectable (ADMELOG) 10 Unit(s) SubCutaneous three times a day before meals  latanoprost 0.005% Ophthalmic Solution 1 Drop(s) Both EYES at bedtime  levothyroxine 100 MICROGram(s) Oral daily  lisinopril 5 milliGRAM(s) Oral daily  metoprolol tartrate 75 milliGRAM(s) Oral two times a day  polyethylene glycol 3350 17 Gram(s) Oral daily    MEDICATIONS  (PRN):  BACItracin   Ointment 1 Application(s) Topical every 12 hours PRN Pain/itching  HYDROmorphone   Tablet 2 milliGRAM(s) Oral every 3 hours PRN Moderate Pain (4 - 6)  HYDROmorphone   Tablet 4 milliGRAM(s) Oral every 4 hours PRN Severe Pain (7 - 10)  naloxone Injectable 0.1 milliGRAM(s) IV Push every 3 minutes PRN For ANY of the following changes in patient status:  A. RR LESS THAN 10 breaths per minute, B. Oxygen saturation LESS THAN 90%, C. Sedation score of 6  ondansetron Injectable 4 milliGRAM(s) IV Push every 6 hours PRN Nausea      Vital Signs Last 24 Hrs  T(C): 37.7 (16 Mar 2021 17:00), Max: 37.7 (16 Mar 2021 17:00)  T(F): 99.9 (16 Mar 2021 17:00), Max: 99.9 (16 Mar 2021 17:00)  HR: 93 (16 Mar 2021 17:00) (78 - 93)  BP: 153/82 (16 Mar 2021 17:00) (115/62 - 157/75)  BP(mean): 95 (15 Mar 2021 19:20) (95 - 105)  RR: 18 (16 Mar 2021 17:00) (16 - 19)  SpO2: 95% (16 Mar 2021 17:00) (92% - 97%)    PHYSICAL EXAM:  GENERAL: NAD, well-developed  HEAD:  Atraumatic, Normocephalic  EYES: EOMI, PERRLA, conjunctiva and sclera clear  NECK: Supple, No JVD  CHEST/LUNG: Clear to auscultation bilaterally; No wheeze Sternal scar healing.  HEART: Regular rate and rhythm; No murmurs, rubs, or gallops  ABDOMEN: Soft, Nontender, Nondistended; Bowel sounds present  EXTREMITIES:  2+ Peripheral Pulses, No clubbing, cyanosis, or edema  PSYCH: AAOx3  NEUROLOGY: non-focal  SKIN: No rashes or lesions    LABS:                        9.5    13.37 )-----------( 190      ( 16 Mar 2021 06:31 )             29.1     03-16    138  |  104  |  18  ----------------------------<  131<H>  4.3   |  25  |  0.92    Ca    8.8      16 Mar 2021 06:32  Phos  1.6     03-16  Mg     2.5     03-16    TPro  6.0  /  Alb  3.3  /  TBili  0.8  /  DBili  x   /  AST  38  /  ALT  33  /  AlkPhos  82  03-16                RADIOLOGY & ADDITIONAL TESTS:    Imaging Personally Reviewed:    Consultant(s) Notes Reviewed:      Care Discussed with Consultants/Other Providers:

## 2021-03-16 NOTE — PROGRESS NOTE ADULT - PROBLEM SELECTOR PLAN 1
-test BG AC/HS  -c/w Lantus 45 units QHS  -c/w Admelog 10 units AC meals  -c/w Admelog moderate correction scale AC and Mod HS scale  Discharge:  recommend basal/bolus however pt unwilling to do injections 4 times/daily.   Can switch him from Novolin N BID to  Novolin 70/30 or Humulin 70/30 35 units BID (AC breakfast/dinner)  On discharge can follow up w/PCP. If pt wants to see an endocrinologist can follow up w/Dr Caldwell in [place]

## 2021-03-16 NOTE — PROGRESS NOTE ADULT - ASSESSMENT
65M with PMH of HTN, T2DM, HLD, hypothyroidism, chronic pancreatitis p/w chest pain x 2 weeks, found with inferolateral wall hypokinesis with inferior TWI on EKG as outpt, admitted for c/f ACS.     CAD/  Unstable angina.   - s/p CABGX3  - postop care    Essential hypertension.   - control    Type 2 diabetes mellitus with hyperglycemia, with long-term current use of insulin.  - ADA diet  - BS control    Hypothyroidism.    - continue Synthroid  - follow TSH.    Chronic pancreatitis.   - chronic abd pain, lipase mildly elevated  - c/t monitor  - c/w pain control.    SBO partial resolved  - surgical eval. No intervention.     HLD (hyperlipidemia).   - high intensity statin.    PT     Sriram Flores MD pager [phone]

## 2021-03-16 NOTE — PROGRESS NOTE ADULT - ASSESSMENT
65M with history of HTN, T2DM, HLD, hypothyroidism, chronic pancreatitis p/w chest pain. Pt states he has been having CP for past 2 weeks - pain was initially only with exertion, located midsternal/epigastric region, burning pain with occasional radiation to the jaw and associated with DUARTE. Pt went to see cardiologist for these symptoms and TTE showed inferolateral wall hypokinesis and referred to ED for possible cath. Patient admitted for unstable angina. Now s/p CABG 3/12. On basal/bolus w/improved glycemic control. Discussed need for mealtime insulin w/pt however will not take insulin more than twice daily. Alternatively can switch him from NPH BID to mixed insulin BID as this will give him better glycemic control w/meals. BG goal (100-180mg/dl).

## 2021-03-17 ENCOUNTER — TRANSCRIPTION ENCOUNTER (OUTPATIENT)
Age: 66
End: 2021-03-17

## 2021-03-17 VITALS
HEART RATE: 76 BPM | SYSTOLIC BLOOD PRESSURE: 114 MMHG | TEMPERATURE: 99 F | RESPIRATION RATE: 17 BRPM | OXYGEN SATURATION: 96 % | DIASTOLIC BLOOD PRESSURE: 63 MMHG

## 2021-03-17 LAB
ANION GAP SERPL CALC-SCNC: 12 MMOL/L — SIGNIFICANT CHANGE UP (ref 5–17)
BUN SERPL-MCNC: 15 MG/DL — SIGNIFICANT CHANGE UP (ref 7–23)
CALCIUM SERPL-MCNC: 9.2 MG/DL — SIGNIFICANT CHANGE UP (ref 8.4–10.5)
CHLORIDE SERPL-SCNC: 100 MMOL/L — SIGNIFICANT CHANGE UP (ref 96–108)
CO2 SERPL-SCNC: 24 MMOL/L — SIGNIFICANT CHANGE UP (ref 22–31)
CREAT SERPL-MCNC: 0.98 MG/DL — SIGNIFICANT CHANGE UP (ref 0.5–1.3)
GLUCOSE BLDC GLUCOMTR-MCNC: 102 MG/DL — HIGH (ref 70–99)
GLUCOSE BLDC GLUCOMTR-MCNC: 102 MG/DL — HIGH (ref 70–99)
GLUCOSE BLDC GLUCOMTR-MCNC: 75 MG/DL — SIGNIFICANT CHANGE UP (ref 70–99)
GLUCOSE BLDC GLUCOMTR-MCNC: 76 MG/DL — SIGNIFICANT CHANGE UP (ref 70–99)
GLUCOSE SERPL-MCNC: 77 MG/DL — SIGNIFICANT CHANGE UP (ref 70–99)
HCT VFR BLD CALC: 29.4 % — LOW (ref 39–50)
HGB BLD-MCNC: 9.5 G/DL — LOW (ref 13–17)
MCHC RBC-ENTMCNC: 27.9 PG — SIGNIFICANT CHANGE UP (ref 27–34)
MCHC RBC-ENTMCNC: 32.3 GM/DL — SIGNIFICANT CHANGE UP (ref 32–36)
MCV RBC AUTO: 86.2 FL — SIGNIFICANT CHANGE UP (ref 80–100)
NRBC # BLD: 0 /100 WBCS — SIGNIFICANT CHANGE UP (ref 0–0)
PLATELET # BLD AUTO: 255 K/UL — SIGNIFICANT CHANGE UP (ref 150–400)
POTASSIUM SERPL-MCNC: 4.2 MMOL/L — SIGNIFICANT CHANGE UP (ref 3.5–5.3)
POTASSIUM SERPL-SCNC: 4.2 MMOL/L — SIGNIFICANT CHANGE UP (ref 3.5–5.3)
RBC # BLD: 3.41 M/UL — LOW (ref 4.2–5.8)
RBC # FLD: 14.4 % — SIGNIFICANT CHANGE UP (ref 10.3–14.5)
SODIUM SERPL-SCNC: 136 MMOL/L — SIGNIFICANT CHANGE UP (ref 135–145)
WBC # BLD: 15.46 K/UL — HIGH (ref 3.8–10.5)
WBC # FLD AUTO: 15.46 K/UL — HIGH (ref 3.8–10.5)

## 2021-03-17 PROCEDURE — 83605 ASSAY OF LACTIC ACID: CPT

## 2021-03-17 PROCEDURE — 82565 ASSAY OF CREATININE: CPT

## 2021-03-17 PROCEDURE — 85730 THROMBOPLASTIN TIME PARTIAL: CPT

## 2021-03-17 PROCEDURE — 85014 HEMATOCRIT: CPT

## 2021-03-17 PROCEDURE — P9045: CPT

## 2021-03-17 PROCEDURE — 86901 BLOOD TYPING SEROLOGIC RH(D): CPT

## 2021-03-17 PROCEDURE — 82803 BLOOD GASES ANY COMBINATION: CPT

## 2021-03-17 PROCEDURE — 96374 THER/PROPH/DIAG INJ IV PUSH: CPT

## 2021-03-17 PROCEDURE — 93458 L HRT ARTERY/VENTRICLE ANGIO: CPT

## 2021-03-17 PROCEDURE — 82330 ASSAY OF CALCIUM: CPT

## 2021-03-17 PROCEDURE — 86923 COMPATIBILITY TEST ELECTRIC: CPT

## 2021-03-17 PROCEDURE — 85576 BLOOD PLATELET AGGREGATION: CPT

## 2021-03-17 PROCEDURE — 99152 MOD SED SAME PHYS/QHP 5/>YRS: CPT

## 2021-03-17 PROCEDURE — 85018 HEMOGLOBIN: CPT

## 2021-03-17 PROCEDURE — 83036 HEMOGLOBIN GLYCOSYLATED A1C: CPT

## 2021-03-17 PROCEDURE — 86891 AUTOLOGOUS BLOOD OP SALVAGE: CPT

## 2021-03-17 PROCEDURE — C1889: CPT

## 2021-03-17 PROCEDURE — 86900 BLOOD TYPING SEROLOGIC ABO: CPT

## 2021-03-17 PROCEDURE — C1769: CPT

## 2021-03-17 PROCEDURE — 97162 PT EVAL MOD COMPLEX 30 MIN: CPT

## 2021-03-17 PROCEDURE — 94002 VENT MGMT INPAT INIT DAY: CPT

## 2021-03-17 PROCEDURE — 85396 CLOTTING ASSAY WHOLE BLOOD: CPT

## 2021-03-17 PROCEDURE — 84443 ASSAY THYROID STIM HORMONE: CPT

## 2021-03-17 PROCEDURE — 80048 BASIC METABOLIC PNL TOTAL CA: CPT

## 2021-03-17 PROCEDURE — U0003: CPT

## 2021-03-17 PROCEDURE — 97112 NEUROMUSCULAR REEDUCATION: CPT

## 2021-03-17 PROCEDURE — 33967 INSERT I-AORT PERCUT DEVICE: CPT

## 2021-03-17 PROCEDURE — 80061 LIPID PANEL: CPT

## 2021-03-17 PROCEDURE — C1887: CPT

## 2021-03-17 PROCEDURE — 82947 ASSAY GLUCOSE BLOOD QUANT: CPT

## 2021-03-17 PROCEDURE — 84295 ASSAY OF SERUM SODIUM: CPT

## 2021-03-17 PROCEDURE — 87641 MR-STAPH DNA AMP PROBE: CPT

## 2021-03-17 PROCEDURE — 87640 STAPH A DNA AMP PROBE: CPT

## 2021-03-17 PROCEDURE — U0005: CPT

## 2021-03-17 PROCEDURE — 94010 BREATHING CAPACITY TEST: CPT

## 2021-03-17 PROCEDURE — C1894: CPT

## 2021-03-17 PROCEDURE — 85025 COMPLETE CBC W/AUTO DIFF WBC: CPT

## 2021-03-17 PROCEDURE — 85027 COMPLETE CBC AUTOMATED: CPT

## 2021-03-17 PROCEDURE — 85610 PROTHROMBIN TIME: CPT

## 2021-03-17 PROCEDURE — 83690 ASSAY OF LIPASE: CPT

## 2021-03-17 PROCEDURE — 97116 GAIT TRAINING THERAPY: CPT

## 2021-03-17 PROCEDURE — 97110 THERAPEUTIC EXERCISES: CPT

## 2021-03-17 PROCEDURE — 74177 CT ABD & PELVIS W/CONTRAST: CPT

## 2021-03-17 PROCEDURE — C1751: CPT

## 2021-03-17 PROCEDURE — 82553 CREATINE MB FRACTION: CPT

## 2021-03-17 PROCEDURE — 84484 ASSAY OF TROPONIN QUANT: CPT

## 2021-03-17 PROCEDURE — 86769 SARS-COV-2 COVID-19 ANTIBODY: CPT

## 2021-03-17 PROCEDURE — 84100 ASSAY OF PHOSPHORUS: CPT

## 2021-03-17 PROCEDURE — 71045 X-RAY EXAM CHEST 1 VIEW: CPT

## 2021-03-17 PROCEDURE — C8929: CPT

## 2021-03-17 PROCEDURE — 85384 FIBRINOGEN ACTIVITY: CPT

## 2021-03-17 PROCEDURE — 83880 ASSAY OF NATRIURETIC PEPTIDE: CPT

## 2021-03-17 PROCEDURE — P9047: CPT

## 2021-03-17 PROCEDURE — 93880 EXTRACRANIAL BILAT STUDY: CPT

## 2021-03-17 PROCEDURE — 86850 RBC ANTIBODY SCREEN: CPT

## 2021-03-17 PROCEDURE — 83735 ASSAY OF MAGNESIUM: CPT

## 2021-03-17 PROCEDURE — 86803 HEPATITIS C AB TEST: CPT

## 2021-03-17 PROCEDURE — 82962 GLUCOSE BLOOD TEST: CPT

## 2021-03-17 PROCEDURE — 93005 ELECTROCARDIOGRAM TRACING: CPT

## 2021-03-17 PROCEDURE — 82550 ASSAY OF CK (CPK): CPT

## 2021-03-17 PROCEDURE — 84132 ASSAY OF SERUM POTASSIUM: CPT

## 2021-03-17 PROCEDURE — 82435 ASSAY OF BLOOD CHLORIDE: CPT

## 2021-03-17 PROCEDURE — 80053 COMPREHEN METABOLIC PANEL: CPT

## 2021-03-17 PROCEDURE — 99285 EMERGENCY DEPT VISIT HI MDM: CPT

## 2021-03-17 PROCEDURE — P9037: CPT

## 2021-03-17 RX ORDER — ASPIRIN/CALCIUM CARB/MAGNESIUM 324 MG
1 TABLET ORAL
Qty: 0 | Refills: 0 | DISCHARGE

## 2021-03-17 RX ORDER — ASPIRIN/CALCIUM CARB/MAGNESIUM 324 MG
1 TABLET ORAL
Qty: 30 | Refills: 0
Start: 2021-03-17 | End: 2021-04-15

## 2021-03-17 RX ORDER — METOPROLOL TARTRATE 50 MG
1 TABLET ORAL
Qty: 0 | Refills: 0 | DISCHARGE

## 2021-03-17 RX ORDER — ATORVASTATIN CALCIUM 80 MG/1
1 TABLET, FILM COATED ORAL
Qty: 0 | Refills: 0 | DISCHARGE

## 2021-03-17 RX ORDER — FAMOTIDINE 10 MG/ML
1 INJECTION INTRAVENOUS
Qty: 30 | Refills: 0
Start: 2021-03-17 | End: 2021-04-15

## 2021-03-17 RX ORDER — HUMAN INSULIN 100 [IU]/ML
60 INJECTION, SUSPENSION SUBCUTANEOUS
Qty: 0 | Refills: 0 | DISCHARGE

## 2021-03-17 RX ORDER — LOSARTAN POTASSIUM 100 MG/1
1 TABLET, FILM COATED ORAL
Qty: 0 | Refills: 0 | DISCHARGE

## 2021-03-17 RX ORDER — BACITRACIN ZINC 500 UNIT/G
1 OINTMENT IN PACKET (EA) TOPICAL
Qty: 0 | Refills: 0 | DISCHARGE
Start: 2021-03-17

## 2021-03-17 RX ORDER — LISINOPRIL 2.5 MG/1
1 TABLET ORAL
Qty: 30 | Refills: 0
Start: 2021-03-17 | End: 2021-04-15

## 2021-03-17 RX ORDER — HUMAN INSULIN 100 [IU]/ML
25 INJECTION, SUSPENSION SUBCUTANEOUS
Qty: 0 | Refills: 0 | DISCHARGE
Start: 2021-03-17 | End: 2021-04-15

## 2021-03-17 RX ORDER — ATORVASTATIN CALCIUM 80 MG/1
1 TABLET, FILM COATED ORAL
Qty: 30 | Refills: 0
Start: 2021-03-17 | End: 2021-04-15

## 2021-03-17 RX ORDER — HUMAN INSULIN 100 [IU]/ML
35 INJECTION, SUSPENSION SUBCUTANEOUS
Qty: 10 | Refills: 0
Start: 2021-03-17 | End: 2021-04-15

## 2021-03-17 RX ORDER — HYDROMORPHONE HYDROCHLORIDE 2 MG/ML
1 INJECTION INTRAMUSCULAR; INTRAVENOUS; SUBCUTANEOUS
Qty: 30 | Refills: 0
Start: 2021-03-17 | End: 2021-03-21

## 2021-03-17 RX ORDER — POLYETHYLENE GLYCOL 3350 17 G/17G
17 POWDER, FOR SOLUTION ORAL
Qty: 238 | Refills: 0
Start: 2021-03-17 | End: 2021-03-30

## 2021-03-17 RX ORDER — METOPROLOL TARTRATE 50 MG
1 TABLET ORAL
Qty: 60 | Refills: 0
Start: 2021-03-17 | End: 2021-04-15

## 2021-03-17 RX ADMIN — Medication 75 MILLIGRAM(S): at 05:53

## 2021-03-17 RX ADMIN — Medication 81 MILLIGRAM(S): at 08:03

## 2021-03-17 RX ADMIN — HEPARIN SODIUM 5000 UNIT(S): 5000 INJECTION INTRAVENOUS; SUBCUTANEOUS at 05:50

## 2021-03-17 RX ADMIN — Medication 10 UNIT(S): at 12:06

## 2021-03-17 RX ADMIN — FAMOTIDINE 20 MILLIGRAM(S): 10 INJECTION INTRAVENOUS at 08:03

## 2021-03-17 RX ADMIN — Medication 100 MICROGRAM(S): at 05:53

## 2021-03-17 RX ADMIN — LISINOPRIL 5 MILLIGRAM(S): 2.5 TABLET ORAL at 05:53

## 2021-03-17 RX ADMIN — Medication 10 UNIT(S): at 08:03

## 2021-03-17 NOTE — PROGRESS NOTE ADULT - REASON FOR ADMISSION
chest pain
chest pain   sp    C3L
chest pain

## 2021-03-17 NOTE — DISCHARGE NOTE NURSING/CASE MANAGEMENT/SOCIAL WORK - NSDCFUADDAPPT_GEN_ALL_CORE_FT
1. Follow up with Dr. Perkins on 3/24/21 at 12:00 pm for a post op follow up visit, please call the [facility] office to confirm your appointment at [phone].  2. Please schedule an appointment with your PCP in 1-2 weeks, call the office to schedule an appointment.  3. Please schedule an appointment with your Cardiologist Dr. Fraga in 2 weeks, please call the office to schedule an appointment.   4. Please schedule an appointment with Endocrinologist Dr. Bates 2 weeks, please call the office to schedule an appointment.

## 2021-03-17 NOTE — PROGRESS NOTE ADULT - ASSESSMENT
65M with PMH of HTN, T2DM, HLD, hypothyroidism, chronic pancreatitis p/w chest pain x 2 weeks, found with inferolateral wall hypokinesis with inferior TWI on EKG as outpt, admitted for c/f ACS.     CAD/  Unstable angina.   - s/p CABGX3  - postop care    Essential hypertension.   - control    Type 2 diabetes mellitus with hyperglycemia, with long-term current use of insulin.  - ADA diet  - BS control    Hypothyroidism.    - continue Synthroid  - follow TSH.    Chronic pancreatitis.   - chronic abd pain, lipase mildly elevated  - c/t monitor  - c/w pain control.    SBO partial resolved  - surgical eval. No intervention.     HLD (hyperlipidemia).   - high intensity statin.    PT     Medically stable.    Sriram Flores MD pager [phone]

## 2021-03-17 NOTE — DISCHARGE NOTE PROVIDER - NSDCPNSUBOBJ_GEN_ALL_CORE
VITAL SIGNS    Subjective:  Denies CP, palpitation, SOB, DUARTE, HA, dizziness, N/V/D, fever or chills.  No acute event noted overnight.     Telemetry: NSR     Vital Signs Last 24 Hrs  T(C): 37.2 (21 @ 11:18), Max: 37.7 (21 @ 17:00)  T(F): 98.9 (21 @ 11:18), Max: 99.9 (21 @ 17:00)  HR: 76 (21 @ 11:18) (76 - 93)  BP: 114/63 (21 @ 11:18) (108/56 - 153/82)  RR: 17 (21 @ 11:18) (17 - 18)  SpO2: 96% (21 @ 11:18) (94% - 97%)            07:01  -   @ 07:00  --------------------------------------------------------  IN: 994 mL / OUT: 1100 mL / NET: -106 mL     07:01  -   @ 11:48  --------------------------------------------------------  IN: 200 mL / OUT: 0 mL / NET: 200 mL    Daily     Daily Weight in k (17 Mar 2021 08:00)    CAPILLARY BLOOD GLUCOSE    POCT Blood Glucose.: 75 mg/dL (17 Mar 2021 11:35)  POCT Blood Glucose.: 76 mg/dL (17 Mar 2021 10:46)  POCT Blood Glucose.: 102 mg/dL (17 Mar 2021 07:29)  POCT Blood Glucose.: 102 mg/dL (17 Mar 2021 02:34)  POCT Blood Glucose.: 78 mg/dL (16 Mar 2021 21:31)  POCT Blood Glucose.: 170 mg/dL (16 Mar 2021 16:33)        PHYSICAL EXAM    Neurology: alert and oriented x 3, nonfocal, no gross deficits    CV: (+) S1 and S2, No murmurs, rubs, gallops or clicks     Sternal Wound: MSI -->CDI, sternum stable; small amount of serous sanguinous drainage from mid incision     Lungs: CTA B/L     Abdomen: soft, nontender, nondistended, positive bowel sounds, (+) Flatus; (+) BM     : Voiding               Extremities:  B/L LE (+) trace edema; negative calf tenderness; (+) 2 DP palpable        acetaminophen  IVPB .. 1000 milliGRAM(s) IV Intermittent once  aspirin enteric coated 81 milliGRAM(s) Oral daily  atorvastatin 40 milliGRAM(s) Oral at bedtime  BACItracin   Ointment 1 Application(s) Topical every 12 hours PRN  chlorhexidine 2% Cloths 1 Application(s) Topical <User Schedule>  famotidine    Tablet 20 milliGRAM(s) Oral daily  heparin   Injectable 5000 Unit(s) SubCutaneous every 8 hours  HYDROmorphone   Tablet 2 milliGRAM(s) Oral every 3 hours PRN  HYDROmorphone   Tablet 4 milliGRAM(s) Oral every 4 hours PRN  insulin glargine Injectable (LANTUS) 45 Unit(s) SubCutaneous at bedtime  insulin lispro (ADMELOG) corrective regimen sliding scale   SubCutaneous three times a day before meals  insulin lispro (ADMELOG) corrective regimen sliding scale   SubCutaneous at bedtime  insulin lispro Injectable (ADMELOG) 10 Unit(s) SubCutaneous three times a day before meals  latanoprost 0.005% Ophthalmic Solution 1 Drop(s) Both EYES at bedtime  levothyroxine 100 MICROGram(s) Oral daily  lisinopril 5 milliGRAM(s) Oral daily  metoprolol tartrate 75 milliGRAM(s) Oral two times a day  naloxone Injectable 0.1 milliGRAM(s) IV Push every 3 minutes PRN  ondansetron Injectable 4 milliGRAM(s) IV Push every 6 hours PRN  polyethylene glycol 3350 17 Gram(s) Oral daily    Physical Therapy Rec:   Home  [  ]   Home w/ PT  [ X ]  Rehab  [  ]    Discussed with Cardiothoracic Team at AM rounds.    Spent 45 min face to face encounter with patient and discharge note.

## 2021-03-17 NOTE — DISCHARGE NOTE PROVIDER - CARE PROVIDER_API CALL
Cash Perkins)  Thoracic and Cardiac Surgery  [address] [place] [address]  Phone: [phone]  Fax: [phone]  Scheduled Appointment: 03/31/2021    Hitesh Fraga J  CARDIOVASCULAR DISEASE  [address] [place] [address]  Phone: [phone]  Fax: [phone]  Established Patient  Follow Up Time: 2 weeks   Cash Perkins)  Thoracic and Cardiac Surgery  [address] [place] [address]  Phone: [phone]  Fax: [phone]  Scheduled Appointment: 03/31/2021    Hitesh Fraga J  CARDIOVASCULAR DISEASE  [address] [place] [address]  Phone: [phone]  Fax: [phone]  Established Patient  Follow Up Time: 2 weeks    Pj Bates)  EndocrinologyMetabDiabetes; Internal Medicine  [address] [facility], [address]rd [place] [address]  Phone: [phone]  Fax: [phone]  Follow Up Time: 2 weeks    Alex Sharp  Internal medicine   [address] [address] [place] [address]  Phone: [phone]  Fax: (   )    -  Follow Up Time: 2 weeks   Cash Perkins)  Thoracic and Cardiac Surgery  [address] [place] [address]  Phone: [phone]  Fax: [phone]  Scheduled Appointment: 03/24/2021 12:00 PM    Hitesh Fraga  CARDIOVASCULAR DISEASE  [address] [place] [address]  Phone: [phone]  Fax: [phone]  Established Patient  Follow Up Time: 2 weeks    Pj Bates)  EndocrinologyMetabDiabetes; Internal Medicine  [address] [facility], [address]rd [place] [address]  Phone: [phone]  Fax: [phone]  Follow Up Time: 2 weeks    Alex Sharp  Internal medicine   [address] [address] [place] [address]  Phone: [phone]  Fax: (   )    -  Follow Up Time: 2 weeks

## 2021-03-17 NOTE — DISCHARGE NOTE PROVIDER - PROVIDER TOKENS
PROVIDER:[TOKEN:[[id number]],SCHEDULEDAPPT:[03/31/2021]],PROVIDER:[TOKEN:[[id number]],FOLLOWUP:[2 weeks],ESTABLISHEDPATIENT:[T]] PROVIDER:[TOKEN:[[id number]],SCHEDULEDAPPT:[03/31/2021]],PROVIDER:[TOKEN:[[id number]],FOLLOWUP:[2 weeks],ESTABLISHEDPATIENT:[T]],PROVIDER:[TOKEN:[[id number]],FOLLOWUP:[2 weeks]],FREE:[LAST:[Wassem],FIRST:[Alex],PHONE:[phone]],FAX:[(   )    -],ADDRESS:[Internal medicine   [address] [place] [address]],FOLLOWUP:[2 weeks]] PROVIDER:[TOKEN:[[id number]],SCHEDULEDAPPT:[03/24/2021],SCHEDULEDAPPTTIME:[12:00 PM]],PROVIDER:[TOKEN:[[id number]],FOLLOWUP:[2 weeks],ESTABLISHEDPATIENT:[T]],PROVIDER:[TOKEN:[[id number]],FOLLOWUP:[2 weeks]],FREE:[LAST:[Wassem],FIRST:[Alex],PHONE:[phone]],FAX:[(   )    -],ADDRESS:[Internal medicine   [address] [place] [address]],FOLLOWUP:[2 weeks]]

## 2021-03-17 NOTE — PROGRESS NOTE ADULT - SUBJECTIVE AND OBJECTIVE BOX
Cardiovascular Disease Progress Note    Overnight events: No acute events overnight.  no new cardiac sx  Otherwise review of systems negative    Objective Findings:  T(C): 37.2 (21 @ 04:37), Max: 37.7 (21 @ 17:00)  HR: 89 (21 @ 04:37) (77 - 93)  BP: 133/65 (21 @ 04:37) (115/62 - 153/82)  RR: 18 (21 @ 04:37) (16 - 18)  SpO2: 94% (21 @ 04:37) (94% - 97%)  Wt(kg): --  Daily     Daily Weight in k.2 (16 Mar 2021 09:20)      Physical Exam:  Gen: NAD  HEENT: EOMI  CV: RRR, normal S1 + S2, no m/r/g  Lungs: CTAB  Abd: soft, non-tender  Ext: No edema    Telemetry: sr    Laboratory Data:                        9.5    15.46 )-----------( 255      ( 17 Mar 2021 06:17 )             29.4     -17    136  |  100  |  15  ----------------------------<  77  4.2   |  24  |  0.98    Ca    9.2      17 Mar 2021 06:11  Phos  1.6     -16  Mg     2.5     -    TPro  6.0  /  Alb  3.3  /  TBili  0.8  /  DBili  x   /  AST  38  /  ALT  33  /  AlkPhos  82  -16              Inpatient Medications:  MEDICATIONS  (STANDING):  acetaminophen  IVPB .. 1000 milliGRAM(s) IV Intermittent once  aspirin enteric coated 81 milliGRAM(s) Oral daily  atorvastatin 40 milliGRAM(s) Oral at bedtime  chlorhexidine 2% Cloths 1 Application(s) Topical <User Schedule>  famotidine    Tablet 20 milliGRAM(s) Oral daily  heparin   Injectable 5000 Unit(s) SubCutaneous every 8 hours  insulin glargine Injectable (LANTUS) 45 Unit(s) SubCutaneous at bedtime  insulin lispro (ADMELOG) corrective regimen sliding scale   SubCutaneous three times a day before meals  insulin lispro (ADMELOG) corrective regimen sliding scale   SubCutaneous at bedtime  insulin lispro Injectable (ADMELOG) 10 Unit(s) SubCutaneous three times a day before meals  latanoprost 0.005% Ophthalmic Solution 1 Drop(s) Both EYES at bedtime  levothyroxine 100 MICROGram(s) Oral daily  lisinopril 5 milliGRAM(s) Oral daily  metoprolol tartrate 75 milliGRAM(s) Oral two times a day  polyethylene glycol 3350 17 Gram(s) Oral daily      Assessment:  -unstable angina  -mvd  -htn  -hld  -dm  -chronic pancreatitis    Recs:  s/p cabg with dr martinez  iabp removed  cw asa and statin  metop 50mg bid  a/w lisinopril for improved afterload reduction  pain control   dispo planning      Over 25 minutes spent on total encounter; more than 50% of the visit was spent counseling and/or coordinating care by the attending physician.      Alexei Fraga MD   Cardiovascular Disease  [phone]

## 2021-03-17 NOTE — DISCHARGE NOTE NURSING/CASE MANAGEMENT/SOCIAL WORK - PATIENT PORTAL LINK FT
You can access the FollowMyHealth Patient Portal offered by [facility] by registering at the following website: [email or web address]. By joining [facility]’s FollowMyHealth portal, you will also be able to view your health information using other applications (apps) compatible with our system.

## 2021-03-17 NOTE — DISCHARGE NOTE PROVIDER - NSDCFUADDAPPT_GEN_ALL_CORE_FT
1. Follow up with Dr. Perkins on 3/31/21 at for a post op follow up visit, please call the [facility] office to confirm your appointment at [phone].  2. Please schedule an appointment with your PCP in 1-2 weeks, call the office to schedule an appointment.  3. Please schedule an appointment with your Cardiologist Dr. Fraga in 2 weeks, please call the office to schedule an appointment.   4. Please schedule an appointment with Endocrinologist   2 weeks, please call the office to schedule an appointment.  1. Follow up with Dr. Perkins on 3/24/21 at 12:00 pm for a post op follow up visit, please call the [facility] office to confirm your appointment at [phone].  2. Please schedule an appointment with your PCP in 1-2 weeks, call the office to schedule an appointment.  3. Please schedule an appointment with your Cardiologist Dr. Fraga in 2 weeks, please call the office to schedule an appointment.   4. Please schedule an appointment with Endocrinologist Dr. Bates 2 weeks, please call the office to schedule an appointment.

## 2021-03-17 NOTE — DISCHARGE NOTE PROVIDER - NSDCMRMEDTOKEN_GEN_ALL_CORE_FT
aspirin 81 mg oral delayed release tablet: 1 tab(s) orally once a day  atorvastatin 40 mg oral tablet: 1 tab(s) orally once a day (at bedtime)  bacitracin 500 units/g topical ointment: 1 application topically every 12 hours, As needed, Pain/itching  famotidine 20 mg oral tablet: 1 tab(s) orally once a day  HYDROmorphone 2 mg oral tablet: 1 tab(s) orally every 4 hours, As Needed -Moderate Pain (4 - 6) MDD:6 tabs  lisinopril 5 mg oral tablet: 1 tab(s) orally once a day  metoprolol tartrate 75 mg oral tablet: 1 tab(s) orally 2 times a day  NovoLIN N 100 units/mL subcutaneous suspension: 35 unit(s) subcutaneous 2 times a day before breakfast and before dinner   polyethylene glycol 3350 oral powder for reconstitution: 17 gram(s) orally once a day, As Needed -for constipation   Synthroid 100 mcg (0.1 mg) oral tablet: 1 tab(s) orally once a day  Xalatan 0.005% ophthalmic solution: 1 drop(s) in each eye once a day (in the evening)

## 2021-03-17 NOTE — DISCHARGE NOTE PROVIDER - HOSPITAL COURSE
65M with history of former tobacco use quit 2000, HTN, T2DM, HLD, hypothyroidism, chronic pancreatitis/chronic pain medication use p/w chest pain. Pt states he has been having CP for past 2 weeks pain was initially only with exertion, located midsternal/epigastric region, burning pain with occasional radiation to the jaw and associated with DUARTE. Pt went to see cardiologist for these symptoms and TTE showed inferolateral wall hypokinesis and referred to ED for possible cath. Patient admitted for unstable angina.  iSTOP:  Reference #: [id number] Chronic tylenol with codeine usage.   Hospital course:  3/9: Admit: Brilinta load. Cath Dr. Hines:  LAD 90%, prox OM 80%, mRCA 95%, RFA  1700: L Fem IABP CICU  3/10: [id number]  3/11: : Diffuse abd pain: CT A/P chronic pancreatitis left paraduodenal hernia partial small bowel obstruction ? previous bowel resection Gen surg consult: no intervention   3/12: s/p C3L pre-op IABP no products extubated within 24 hours   Post op Course:   3/13: IABP dc'd  3/14: Chest tubes dc'd/ To SDU with PCA pump/Insulin gtt /A+V pacing wires.  Endocrine consult  3/15 transition to oral analgesics.  Increase ambulation. Increase beta blocker.   Transfer to floor  3/16 pulled pw, OOB to chair, vss. Lopressor increased to 75 q12 added Lisinopril 5 mg for HTN.  3/17 VVS; Patient with small amount of serous sanguineous drainage from the mid sternal incision; Leukocytosis WBC 15,000.  Patient asymptomatic, no sign or symptoms of infection at this time. Discussed with Dr. Perkins, instructed to apply dermabond to area. No drainage noted at this time.  Per patient has received first dose of Pfizer vaccination, next dose scheduled for 3/29/21.  Patient medically cleared for discharge home per Dr. Perkins.    66 yo male with history of former tobacco use quit 2000, HTN, T2DM, HLD, hypothyroidism, chronic pancreatitis/chronic pain medication use p/w chest pain. Pt states he has been having CP for past 2 weeks pain was initially only with exertion, located midsternal/epigastric region, burning pain with occasional radiation to the jaw and associated with DUARTE. Pt went to see cardiologist for these symptoms and TTE showed inferolateral wall hypokinesis and referred to ED for possible cath. Patient admitted for unstable angina.  iSTOP:  Reference #: [id number] Chronic tylenol with codeine usage.   Hospital course:  3/9: Admit: Brilinta load. Cath Dr. Hines:  LAD 90%, prox OM 80%, mRCA 95%, RFA  1700: L Fem IABP CICU  3/10: [id number]  3/11: : Diffuse abd pain: CT A/P chronic pancreatitis left paraduodenal hernia partial small bowel obstruction ? previous bowel resection Gen surg consult: no intervention   3/12: s/p C3L pre-op IABP no products extubated within 24 hours   Post op Course:   3/13: IABP dc'd  3/14: Chest tubes dc'd/ To SDU with PCA pump/Insulin gtt /A+V pacing wires.  Endocrine consult  3/15 transition to oral analgesics.  Increase ambulation. Increase beta blocker.   Transfer to floor  3/16 pulled pw, OOB to chair, vss. Lopressor increased to 75 q12 added Lisinopril 5 mg for HTN.  3/17 VVS; Patient with small amount of serous sanguineous drainage from the mid sternal incision; Leukocytosis WBC 15,000.  Patient asymptomatic, no sign or symptoms of infection at this time. Discussed with Dr. Perkins, instructed to apply dermabond to area. No drainage noted at this time.  Per patient has received first dose of Pfizer vaccination, next dose scheduled for 3/29/21.  Patient medically cleared for discharge home per Dr. Perkins.

## 2021-03-17 NOTE — DISCHARGE NOTE PROVIDER - NSDCCPCAREPLAN_GEN_ALL_CORE_FT
PRINCIPAL DISCHARGE DIAGNOSIS  Diagnosis: S/P CABG x 3  Assessment and Plan of Treatment:   1. Daily Shower  2. Weight yourself daily and notify any weight gain greater than 2-3 pounds in 24 hours.  3. Regular DASH Diabetic diet - low fat, low cholesterol, no added salt.  4. Cleanse Midsternal incision and leg incision daily while showering with warm water and mild soap, pat dry and maintain open to air.   5. Follow Cardiac Surgery Do's and Don'ts discharge instructions.   6. No driving until cleared by MD.   7. No heavy lifting nothing greater than 5 pounds until cleared by MD.   8. Call / Notify MD any fever greater than 101.0  9. Increase Activity as tolerated.   10.  Continue on current medication regimen as prescribed on your discharge paperwork.      SECONDARY DISCHARGE DIAGNOSES  Diagnosis: Diabetes mellitus  Assessment and Plan of Treatment:   1. Novolin 70/30 35 units BID (AC breakfast/dinner)  2. Follow up with PCP and Endocrinologist Dr. Caldwell in [place] in 1-2 weeks for Diabetes and sugar control.   3. Accuchecks 2 times a day before meals

## 2021-03-18 ENCOUNTER — NON-APPOINTMENT (OUTPATIENT)
Age: 66
End: 2021-03-18

## 2021-03-18 RX ORDER — ATORVASTATIN CALCIUM 40 MG/1
40 TABLET, FILM COATED ORAL
Refills: 0 | Status: ACTIVE | COMMUNITY

## 2021-03-18 RX ORDER — LEVOTHYROXINE SODIUM 100 UG/1
100 TABLET ORAL
Refills: 0 | Status: ACTIVE | COMMUNITY

## 2021-03-18 RX ORDER — BACITRACIN ZINC 500 [USP'U]/G
500 OINTMENT TOPICAL
Refills: 0 | Status: ACTIVE | COMMUNITY

## 2021-03-18 RX ORDER — FAMOTIDINE 20 MG/1
20 TABLET, FILM COATED ORAL
Refills: 0 | Status: ACTIVE | COMMUNITY

## 2021-03-18 RX ORDER — HYDROMORPHONE HYDROCHLORIDE 2 MG/1
2 TABLET ORAL
Refills: 0 | Status: ACTIVE | COMMUNITY

## 2021-03-18 RX ORDER — LISINOPRIL 5 MG/1
5 TABLET ORAL
Refills: 0 | Status: ACTIVE | COMMUNITY

## 2021-03-18 RX ORDER — LATANOPROST 50 UG/ML
0.01 SOLUTION OPHTHALMIC
Refills: 0 | Status: ACTIVE | COMMUNITY

## 2021-03-18 RX ORDER — HUMAN INSULIN 100 [IU]/ML
100 INJECTION, SOLUTION SUBCUTANEOUS
Refills: 0 | Status: ACTIVE | COMMUNITY

## 2021-03-18 RX ORDER — ASPIRIN 81 MG
81 TABLET, DELAYED RELEASE (ENTERIC COATED) ORAL
Refills: 0 | Status: ACTIVE | COMMUNITY

## 2021-03-24 ENCOUNTER — APPOINTMENT (OUTPATIENT)
Dept: CARDIOTHORACIC SURGERY | Facility: CLINIC | Age: 66
End: 2021-03-24

## 2021-03-24 DIAGNOSIS — Z95.1 PRESENCE OF AORTOCORONARY BYPASS GRAFT: ICD-10-CM

## 2021-03-29 ENCOUNTER — RESULT REVIEW (OUTPATIENT)
Age: 66
End: 2021-03-29

## 2021-03-31 ENCOUNTER — APPOINTMENT (OUTPATIENT)
Dept: CARDIOTHORACIC SURGERY | Facility: CLINIC | Age: 66
End: 2021-03-31

## 2021-03-31 VITALS
SYSTOLIC BLOOD PRESSURE: 118 MMHG | RESPIRATION RATE: 15 BRPM | TEMPERATURE: 99.2 F | OXYGEN SATURATION: 99 % | DIASTOLIC BLOOD PRESSURE: 66 MMHG | HEART RATE: 80 BPM

## 2021-03-31 VITALS — BODY MASS INDEX: 25.92 KG/M2 | HEIGHT: 69 IN | WEIGHT: 175 LBS

## 2021-03-31 RX ORDER — METOPROLOL TARTRATE 75 MG/1
75 TABLET, FILM COATED ORAL TWICE DAILY
Refills: 0 | Status: COMPLETED | COMMUNITY
End: 2021-03-31

## 2021-03-31 RX ORDER — METOPROLOL SUCCINATE 50 MG/1
50 TABLET, EXTENDED RELEASE ORAL
Qty: 15 | Refills: 0 | Status: ACTIVE | COMMUNITY
Start: 2021-03-31

## 2021-03-31 RX ORDER — SULFAMETHOXAZOLE AND TRIMETHOPRIM 800; 160 MG/1; MG/1
800-160 TABLET ORAL
Qty: 6 | Refills: 0 | Status: ACTIVE | COMMUNITY
Start: 2021-03-31

## 2021-04-16 ENCOUNTER — TRANSCRIPTION ENCOUNTER (OUTPATIENT)
Age: 66
End: 2021-04-16

## 2021-12-24 ENCOUNTER — EMERGENCY (EMERGENCY)
Facility: HOSPITAL | Age: 66
LOS: 1 days | Discharge: HOME | End: 2021-12-24
Attending: EMERGENCY MEDICINE
Payer: COMMERCIAL

## 2021-12-24 VITALS
RESPIRATION RATE: 20 BRPM | WEIGHT: 199.08 LBS | TEMPERATURE: 98 F | HEART RATE: 67 BPM | DIASTOLIC BLOOD PRESSURE: 82 MMHG | SYSTOLIC BLOOD PRESSURE: 187 MMHG | HEIGHT: 69 IN | OXYGEN SATURATION: 99 %

## 2021-12-24 VITALS
RESPIRATION RATE: 17 BRPM | SYSTOLIC BLOOD PRESSURE: 164 MMHG | DIASTOLIC BLOOD PRESSURE: 75 MMHG | OXYGEN SATURATION: 99 % | HEART RATE: 70 BPM | TEMPERATURE: 98 F

## 2021-12-24 LAB
ALBUMIN SERPL ELPH-MCNC: 4.5 G/DL — SIGNIFICANT CHANGE UP (ref 3.3–5)
ALP SERPL-CCNC: 110 U/L — SIGNIFICANT CHANGE UP (ref 40–120)
ALT FLD-CCNC: 33 U/L — SIGNIFICANT CHANGE UP (ref 10–45)
ANION GAP SERPL CALC-SCNC: 12 MMOL/L — SIGNIFICANT CHANGE UP (ref 5–17)
AST SERPL-CCNC: 25 U/L — SIGNIFICANT CHANGE UP (ref 10–40)
BASOPHILS # BLD AUTO: 0.07 K/UL — SIGNIFICANT CHANGE UP (ref 0–0.2)
BASOPHILS NFR BLD AUTO: 0.6 % — SIGNIFICANT CHANGE UP (ref 0–2)
BILIRUB SERPL-MCNC: 0.3 MG/DL — SIGNIFICANT CHANGE UP (ref 0.2–1.2)
BUN SERPL-MCNC: 15 MG/DL — SIGNIFICANT CHANGE UP (ref 7–23)
CALCIUM SERPL-MCNC: 9.6 MG/DL — SIGNIFICANT CHANGE UP (ref 8.4–10.5)
CHLORIDE SERPL-SCNC: 103 MMOL/L — SIGNIFICANT CHANGE UP (ref 96–108)
CO2 SERPL-SCNC: 24 MMOL/L — SIGNIFICANT CHANGE UP (ref 22–31)
CREAT SERPL-MCNC: 0.94 MG/DL — SIGNIFICANT CHANGE UP (ref 0.5–1.3)
EOSINOPHIL # BLD AUTO: 0.34 K/UL — SIGNIFICANT CHANGE UP (ref 0–0.5)
EOSINOPHIL NFR BLD AUTO: 3.1 % — SIGNIFICANT CHANGE UP (ref 0–6)
GLUCOSE SERPL-MCNC: 286 MG/DL — HIGH (ref 70–99)
HCT VFR BLD CALC: 47.1 % — SIGNIFICANT CHANGE UP (ref 39–50)
HGB BLD-MCNC: 15 G/DL — SIGNIFICANT CHANGE UP (ref 13–17)
IMM GRANULOCYTES NFR BLD AUTO: 0.3 % — SIGNIFICANT CHANGE UP (ref 0–1.5)
LIDOCAIN IGE QN: 18 U/L — SIGNIFICANT CHANGE UP (ref 7–60)
LYMPHOCYTES # BLD AUTO: 2.75 K/UL — SIGNIFICANT CHANGE UP (ref 1–3.3)
LYMPHOCYTES # BLD AUTO: 25.3 % — SIGNIFICANT CHANGE UP (ref 13–44)
MCHC RBC-ENTMCNC: 27.6 PG — SIGNIFICANT CHANGE UP (ref 27–34)
MCHC RBC-ENTMCNC: 31.8 GM/DL — LOW (ref 32–36)
MCV RBC AUTO: 86.6 FL — SIGNIFICANT CHANGE UP (ref 80–100)
MONOCYTES # BLD AUTO: 0.7 K/UL — SIGNIFICANT CHANGE UP (ref 0–0.9)
MONOCYTES NFR BLD AUTO: 6.4 % — SIGNIFICANT CHANGE UP (ref 2–14)
NEUTROPHILS # BLD AUTO: 6.99 K/UL — SIGNIFICANT CHANGE UP (ref 1.8–7.4)
NEUTROPHILS NFR BLD AUTO: 64.3 % — SIGNIFICANT CHANGE UP (ref 43–77)
NRBC # BLD: 0 /100 WBCS — SIGNIFICANT CHANGE UP (ref 0–0)
PLATELET # BLD AUTO: 250 K/UL — SIGNIFICANT CHANGE UP (ref 150–400)
POTASSIUM SERPL-MCNC: 4.5 MMOL/L — SIGNIFICANT CHANGE UP (ref 3.5–5.3)
POTASSIUM SERPL-SCNC: 4.5 MMOL/L — SIGNIFICANT CHANGE UP (ref 3.5–5.3)
PROT SERPL-MCNC: 7.3 G/DL — SIGNIFICANT CHANGE UP (ref 6–8.3)
RBC # BLD: 5.44 M/UL — SIGNIFICANT CHANGE UP (ref 4.2–5.8)
RBC # FLD: 13.2 % — SIGNIFICANT CHANGE UP (ref 10.3–14.5)
SARS-COV-2 RNA SPEC QL NAA+PROBE: SIGNIFICANT CHANGE UP
SODIUM SERPL-SCNC: 139 MMOL/L — SIGNIFICANT CHANGE UP (ref 135–145)
TROPONIN T, HIGH SENSITIVITY RESULT: 8 NG/L — SIGNIFICANT CHANGE UP (ref 0–51)
TROPONIN T, HIGH SENSITIVITY RESULT: 9 NG/L — SIGNIFICANT CHANGE UP (ref 0–51)
WBC # BLD: 10.88 K/UL — HIGH (ref 3.8–10.5)
WBC # FLD AUTO: 10.88 K/UL — HIGH (ref 3.8–10.5)

## 2021-12-24 PROCEDURE — U0003: CPT

## 2021-12-24 PROCEDURE — 83690 ASSAY OF LIPASE: CPT

## 2021-12-24 PROCEDURE — U0005: CPT

## 2021-12-24 PROCEDURE — 71046 X-RAY EXAM CHEST 2 VIEWS: CPT | Mod: 26

## 2021-12-24 PROCEDURE — 85025 COMPLETE CBC W/AUTO DIFF WBC: CPT

## 2021-12-24 PROCEDURE — 93010 ELECTROCARDIOGRAM REPORT: CPT

## 2021-12-24 PROCEDURE — 93005 ELECTROCARDIOGRAM TRACING: CPT

## 2021-12-24 PROCEDURE — 99284 EMERGENCY DEPT VISIT MOD MDM: CPT | Mod: 25

## 2021-12-24 PROCEDURE — 71046 X-RAY EXAM CHEST 2 VIEWS: CPT

## 2021-12-24 PROCEDURE — 80053 COMPREHEN METABOLIC PANEL: CPT

## 2021-12-24 PROCEDURE — 84484 ASSAY OF TROPONIN QUANT: CPT

## 2021-12-24 PROCEDURE — 99284 EMERGENCY DEPT VISIT MOD MDM: CPT | Mod: CS

## 2021-12-24 RX ORDER — IBUPROFEN 200 MG
400 TABLET ORAL ONCE
Refills: 0 | Status: COMPLETED | OUTPATIENT
Start: 2021-12-24 | End: 2021-12-24

## 2021-12-24 NOTE — ED PROVIDER NOTE - NSFOLLOWUPINSTRUCTIONS_ED_ALL_ED_FT
Please follow up with your primary care physician or cardiologist within the next 1-3 days.     Chest Pain    WHAT YOU NEED TO KNOW:  Chest pain can be caused by a range of conditions, from not serious to life-threatening. Chest pain can be a symptom of a digestive problem, such as acid reflux or a stomach ulcer. An anxiety attack or a strong emotion, such as anger, can also cause chest pain. Infection, inflammation, or a fracture in the bones or cartilage in your chest can cause pain or discomfort. Sometimes chest pain or pressure is caused by poor blood flow to your heart (angina). Chest pain may also be caused by life-threatening conditions such as a heart attack or blood clot in your lungs.   DISCHARGE INSTRUCTIONS:  Call 911 if:   •You have any of the following signs of a heart attack: ?Squeezing, pressure, or pain in your chest  ?You may also have any of the following: ?Discomfort or pain in your back, neck, jaw, stomach, or arm  ?Shortness of breath  ?Nausea or vomiting  ?Lightheadedness or a sudden cold sweat  Seek care immediately if:   •You have chest discomfort that gets worse, even with medicine.  •You cough or vomit blood.   •Your bowel movements are black or bloody.   •You cannot stop vomiting, or it hurts to swallow.   Contact your healthcare provider if:   •You have questions or concerns about your condition or care.  Medicines:   •Medicines may be given to treat the cause of your chest pain. Examples include pain medicine, anxiety medicine, or medicines to increase blood flow to your heart.   •Do not take certain medicines without asking your healthcare provider first. These include NSAIDs, herbal or vitamin supplements, or hormones (estrogen or progestin).   •Take your medicine as directed. Contact your healthcare provider if you think your medicine is not helping or if you have side effects. Tell him or her if you are allergic to any medicine. Keep a list of the medicines, vitamins, and herbs you take. Include the amounts, and when and why you take them. Bring the list or the pill bottles to follow-up visits. Carry your medicine list with you in case of an emergency.  Follow up with your healthcare provider within 72 hours, or as directed: You may need to return for more tests to find the cause of your chest pain. You may be referred to a specialist, such as a cardiologist or gastroenterologist. Write down your questions so you remember to ask them during your visits.   Healthy living tips: The following are general healthy guidelines. If your chest pain is caused by a heart problem, your healthcare provider will give you specific guidelines to follow.  •Do not smoke. Nicotine and other chemicals in cigarettes and cigars can cause lung and heart damage. Ask your healthcare provider for information if you currently smoke and need help to quit. E-cigarettes or smokeless tobacco still contain nicotine. Talk to your healthcare provider before you use these products.   •Eat a variety of healthy, low-fat, low-salt foods. Healthy foods include fruits, vegetables, whole-grain breads, low-fat dairy products, beans, lean meats, and fish. Ask for more information about a heart healthy diet.  •Drink plenty of water every day. Your body is made of mostly water. Water helps your body to control temperature and blood pressure. Ask your healthcare provider how much water you should drink every day.  •Ask about activity. Your healthcare provider will tell you which activities to limit or avoid. Ask when you can drive, return to work, and have sex. Ask about the best exercise plan for you.  •Maintain a healthy weight. Ask your healthcare provider how much you should weigh. Ask him or her to help you create a weight loss plan if you are overweight.   If you have a stent:   •Carry your stent card with you at all times.   •Let all healthcare providers know that you have a stent.

## 2021-12-24 NOTE — ED PROVIDER NOTE - CLINICAL SUMMARY MEDICAL DECISION MAKING FREE TEXT BOX
Patient has risk factors for ACS including HTN, HLD, Diabetes, s/p CABG. Labs, troponin, cxr, lipase, pain control. Patient has risk factors for ACS including HTN, HLD, Diabetes, s/p CABG and should have workup for ACS. Pain is reproducible on palpation of the chest wall. Labs, troponin, cxr, lipase, pain control. If troponin is low, patient can follow up with cardiologist for further work up. Patient has risk factors for ACS including HTN, HLD, Diabetes, s/p CABG and should have workup for ACS. Pain is reproducible on palpation of the chest wall. Labs, troponin, cxr, lipase, pain control. If troponin is low, patient can follow up with PCP or cardiologist for further work up.

## 2021-12-24 NOTE — ED PROVIDER NOTE - PHYSICAL EXAMINATION
gen: well appearing  Mentation: AAO x 3  psych: mood appropriate  HEENT: airway patent, conjunctivae clear bilaterally  Cardio: RRR, no m/r/g  Resp: normal BS b/l  GI: soft/nondistended/nontender  : no CVA tenderness  Neuro: sensation and motor function grossly intact  Skin: No evidence of rash  MSK: Tender to palpation on left chest wall  Lymph/Vasc: no LE edema

## 2021-12-24 NOTE — ED ADULT NURSE REASSESSMENT NOTE - NS ED NURSE REASSESS COMMENT FT1
Report received from JONATHAN Beavers Y   Pt AAOx4, NAD, resp nonlabored, skin warm/dry, resting comfortably in bed . Pt c/o cp (7/10) and "little" sob. Pt denies headache, dizziness, chest pain, palpitations, SOB, abd pain, n/v/d, urinary symptoms, fevers, chills, weakness at this time. Pt awaiting for results.  Safety maintained with call bell within reach. Report received from JONATHAN Beavers Y   Pt AAOx4, NAD, resp nonlabored, skin warm/dry, resting comfortably in bed . Pt c/o cp (7/10) and "little" sob. Pt denies headache, dizziness, palpitations,, abd pain, n/v/d, urinary symptoms, fevers, chills, weakness at this time. Pt awaiting for results.  Safety maintained with call bell within reach.

## 2021-12-24 NOTE — ED PROVIDER NOTE - PATIENT PORTAL LINK FT
You can access the FollowMyHealth Patient Portal offered by [facility] by registering at the following website: [email or web address]. By joining [facility]’s FollowMyHealth portal, you will also be able to view your health information using other applications (apps) compatible with our system.

## 2021-12-24 NOTE — ED ADULT NURSE NOTE - OBJECTIVE STATEMENT
66y male with hx of chronic pancreatitis, htn, type 1 DM, htn, triple bypass presents to the ER c/o chest pain. pt is alert and oriented x 4 and speaking coherently. pt states that he has had cp x3 days, but attributed it to heavy lifting. pt states the cp is worse with movement, relieved with rest. pt states today the pain became more constant and severe. described as left sided cp 8/10 and sharp. pain sometimes r/t abdomen. pt reports nausea with eating. +sob with cp and feels hot. pt vss, ekg completed. pt on cm. will reassess.

## 2021-12-24 NOTE — ED PROVIDER NOTE - ATTENDING CONTRIBUTION TO CARE
Pt with reproducible L chest wall pain, not just worse with exertion but also with touch and movement.  Pt has known CAD.  EKG unchanged from previous.  Pt appears well, pulses equal b/l, RRR, clear lungs.  Pt deemed high risk for CAD but this episode appears more MSK, will perform serial troponin r/o acute MI, refer to cardiologist for urgent re-eval given risk factors.

## 2021-12-24 NOTE — ED ADULT NURSE REASSESSMENT NOTE - NS ED NURSE REASSESS COMMENT FT1
Rock RN, pt verbalizes understanding to f/u with PCP and return to ED for any worsening symptoms. Patient d/c home w/ written and verbal instructions. Pt verbalized understanding. IV d/c - No redness or swelling.

## 2021-12-24 NOTE — ED PROVIDER NOTE - OBJECTIVE STATEMENT
66 year old male with a PMHx of Chronic pancreatitis, HTN, HLD, Diabetes type 1, hypothyroidism, s/p CABG in March presenting with CP. Chest pain began 3 days ago while the patient was relaxing at home. Pain was intermittent. Today, the patient woke up with constant chest pain, located in left side of chest, non-radiating. CP is worse with exertion and associated SOB with exertion. Denies SOB at rest, nausea, vomiting, diaphoresis.

## 2021-12-24 NOTE — ED ADULT NURSE NOTE - NS ED PATIENT SAFETY CONCERN
----- Message from Peg Cai MD sent at 10/18/2020  6:47 PM CDT -----  Regarding: please help patient make appt  Transgender patient from Dr. Sanabria.   Needs appt for abnormal uterine bleeding, please.   Goes by [place]. Uses male pronouns.   Thanks!    
No

## 2021-12-24 NOTE — ED ADULT NURSE REASSESSMENT NOTE - NS ED NURSE REASSESS COMMENT FT1
Pt received from [facility] in [place], pt Aox4 breathing even unlabored spontaneously, NAD, reports partial improvement of chest pain. repeat troponin drawn and sent. Pending MD Dispo.

## 2021-12-29 ENCOUNTER — NON-APPOINTMENT (OUTPATIENT)
Age: 66
End: 2021-12-29

## 2021-12-29 ENCOUNTER — OUTPATIENT (OUTPATIENT)
Dept: OUTPATIENT SERVICES | Facility: HOSPITAL | Age: 66
LOS: 1 days | End: 2021-12-29
Payer: COMMERCIAL

## 2021-12-29 VITALS
SYSTOLIC BLOOD PRESSURE: 149 MMHG | HEART RATE: 71 BPM | OXYGEN SATURATION: 100 % | DIASTOLIC BLOOD PRESSURE: 66 MMHG | RESPIRATION RATE: 17 BRPM

## 2021-12-29 VITALS
DIASTOLIC BLOOD PRESSURE: 70 MMHG | WEIGHT: 179.9 LBS | HEART RATE: 66 BPM | OXYGEN SATURATION: 98 % | HEIGHT: 69 IN | TEMPERATURE: 98 F | RESPIRATION RATE: 16 BRPM | SYSTOLIC BLOOD PRESSURE: 159 MMHG

## 2021-12-29 DIAGNOSIS — Z98.890 OTHER SPECIFIED POSTPROCEDURAL STATES: Chronic | ICD-10-CM

## 2021-12-29 DIAGNOSIS — Z90.49 ACQUIRED ABSENCE OF OTHER SPECIFIED PARTS OF DIGESTIVE TRACT: Chronic | ICD-10-CM

## 2021-12-29 DIAGNOSIS — Z95.1 PRESENCE OF AORTOCORONARY BYPASS GRAFT: Chronic | ICD-10-CM

## 2021-12-29 DIAGNOSIS — I25.10 ATHEROSCLEROTIC HEART DISEASE OF NATIVE CORONARY ARTERY WITHOUT ANGINA PECTORIS: ICD-10-CM

## 2021-12-29 LAB
ANION GAP SERPL CALC-SCNC: 9 MMOL/L — SIGNIFICANT CHANGE UP (ref 5–17)
BUN SERPL-MCNC: 17 MG/DL — SIGNIFICANT CHANGE UP (ref 7–23)
CALCIUM SERPL-MCNC: 9.6 MG/DL — SIGNIFICANT CHANGE UP (ref 8.4–10.5)
CHLORIDE SERPL-SCNC: 105 MMOL/L — SIGNIFICANT CHANGE UP (ref 96–108)
CO2 SERPL-SCNC: 25 MMOL/L — SIGNIFICANT CHANGE UP (ref 22–31)
CREAT SERPL-MCNC: 0.93 MG/DL — SIGNIFICANT CHANGE UP (ref 0.5–1.3)
GLUCOSE BLDC GLUCOMTR-MCNC: 130 MG/DL — HIGH (ref 70–99)
GLUCOSE BLDC GLUCOMTR-MCNC: 167 MG/DL — HIGH (ref 70–99)
GLUCOSE SERPL-MCNC: 179 MG/DL — HIGH (ref 70–99)
HCT VFR BLD CALC: 46.8 % — SIGNIFICANT CHANGE UP (ref 39–50)
HGB BLD-MCNC: 15.1 G/DL — SIGNIFICANT CHANGE UP (ref 13–17)
MCHC RBC-ENTMCNC: 27.5 PG — SIGNIFICANT CHANGE UP (ref 27–34)
MCHC RBC-ENTMCNC: 32.3 GM/DL — SIGNIFICANT CHANGE UP (ref 32–36)
MCV RBC AUTO: 85.2 FL — SIGNIFICANT CHANGE UP (ref 80–100)
NRBC # BLD: 0 /100 WBCS — SIGNIFICANT CHANGE UP (ref 0–0)
PLATELET # BLD AUTO: 257 K/UL — SIGNIFICANT CHANGE UP (ref 150–400)
POTASSIUM SERPL-MCNC: 4.5 MMOL/L — SIGNIFICANT CHANGE UP (ref 3.5–5.3)
POTASSIUM SERPL-SCNC: 4.5 MMOL/L — SIGNIFICANT CHANGE UP (ref 3.5–5.3)
RBC # BLD: 5.49 M/UL — SIGNIFICANT CHANGE UP (ref 4.2–5.8)
RBC # FLD: 13.1 % — SIGNIFICANT CHANGE UP (ref 10.3–14.5)
SODIUM SERPL-SCNC: 139 MMOL/L — SIGNIFICANT CHANGE UP (ref 135–145)
WBC # BLD: 10.79 K/UL — HIGH (ref 3.8–10.5)
WBC # FLD AUTO: 10.79 K/UL — HIGH (ref 3.8–10.5)

## 2021-12-29 PROCEDURE — C9600: CPT | Mod: LM

## 2021-12-29 PROCEDURE — 85027 COMPLETE CBC AUTOMATED: CPT

## 2021-12-29 PROCEDURE — 93459 L HRT ART/GRFT ANGIO: CPT | Mod: 59

## 2021-12-29 PROCEDURE — 93459 L HRT ART/GRFT ANGIO: CPT | Mod: 26,59

## 2021-12-29 PROCEDURE — 92928 PRQ TCAT PLMT NTRAC ST 1 LES: CPT | Mod: LM

## 2021-12-29 PROCEDURE — C1874: CPT

## 2021-12-29 PROCEDURE — 93010 ELECTROCARDIOGRAM REPORT: CPT

## 2021-12-29 PROCEDURE — C1725: CPT

## 2021-12-29 PROCEDURE — 93005 ELECTROCARDIOGRAM TRACING: CPT

## 2021-12-29 PROCEDURE — C1769: CPT

## 2021-12-29 PROCEDURE — 99152 MOD SED SAME PHYS/QHP 5/>YRS: CPT

## 2021-12-29 PROCEDURE — C1887: CPT

## 2021-12-29 PROCEDURE — 82962 GLUCOSE BLOOD TEST: CPT

## 2021-12-29 PROCEDURE — 99153 MOD SED SAME PHYS/QHP EA: CPT

## 2021-12-29 PROCEDURE — C1894: CPT

## 2021-12-29 PROCEDURE — 80048 BASIC METABOLIC PNL TOTAL CA: CPT

## 2021-12-29 RX ORDER — INSULIN LISPRO 100/ML
VIAL (ML) SUBCUTANEOUS AT BEDTIME
Refills: 0 | Status: DISCONTINUED | OUTPATIENT
Start: 2021-12-29 | End: 2022-01-12

## 2021-12-29 RX ORDER — GLUCAGON INJECTION, SOLUTION 0.5 MG/.1ML
1 INJECTION, SOLUTION SUBCUTANEOUS ONCE
Refills: 0 | Status: DISCONTINUED | OUTPATIENT
Start: 2021-12-29 | End: 2022-01-12

## 2021-12-29 RX ORDER — CLOPIDOGREL BISULFATE 75 MG/1
1 TABLET, FILM COATED ORAL
Qty: 90 | Refills: 3
Start: 2021-12-29 | End: 2022-12-23

## 2021-12-29 RX ORDER — ASPIRIN/CALCIUM CARB/MAGNESIUM 324 MG
1 TABLET ORAL
Qty: 0 | Refills: 0 | DISCHARGE
Start: 2021-12-29

## 2021-12-29 RX ORDER — GABAPENTIN 400 MG/1
2 CAPSULE ORAL
Qty: 0 | Refills: 0 | DISCHARGE

## 2021-12-29 RX ORDER — DEXTROSE 50 % IN WATER 50 %
25 SYRINGE (ML) INTRAVENOUS ONCE
Refills: 0 | Status: DISCONTINUED | OUTPATIENT
Start: 2021-12-29 | End: 2022-01-12

## 2021-12-29 RX ORDER — ATORVASTATIN CALCIUM 80 MG/1
1 TABLET, FILM COATED ORAL
Qty: 0 | Refills: 0 | DISCHARGE
Start: 2021-12-29

## 2021-12-29 RX ORDER — CLOPIDOGREL BISULFATE 75 MG/1
75 TABLET, FILM COATED ORAL DAILY
Refills: 0 | Status: DISCONTINUED | OUTPATIENT
Start: 2021-12-30 | End: 2022-01-12

## 2021-12-29 RX ORDER — LEVOTHYROXINE SODIUM 125 MCG
1 TABLET ORAL
Qty: 0 | Refills: 0 | DISCHARGE
Start: 2021-12-29

## 2021-12-29 RX ORDER — METOPROLOL TARTRATE 50 MG
1 TABLET ORAL
Qty: 0 | Refills: 0 | DISCHARGE
Start: 2021-12-29

## 2021-12-29 RX ORDER — SODIUM CHLORIDE 9 MG/ML
1000 INJECTION, SOLUTION INTRAVENOUS
Refills: 0 | Status: DISCONTINUED | OUTPATIENT
Start: 2021-12-29 | End: 2022-01-12

## 2021-12-29 RX ORDER — DEXTROSE 50 % IN WATER 50 %
15 SYRINGE (ML) INTRAVENOUS ONCE
Refills: 0 | Status: DISCONTINUED | OUTPATIENT
Start: 2021-12-29 | End: 2022-01-12

## 2021-12-29 RX ORDER — METOPROLOL TARTRATE 50 MG
75 TABLET ORAL
Refills: 0 | Status: DISCONTINUED | OUTPATIENT
Start: 2021-12-29 | End: 2022-01-12

## 2021-12-29 RX ORDER — LATANOPROST 0.05 MG/ML
1 SOLUTION/ DROPS OPHTHALMIC; TOPICAL
Qty: 0 | Refills: 0 | DISCHARGE

## 2021-12-29 RX ORDER — LEVOTHYROXINE SODIUM 125 MCG
1 TABLET ORAL
Qty: 0 | Refills: 0 | DISCHARGE

## 2021-12-29 RX ORDER — LISINOPRIL 2.5 MG/1
5 TABLET ORAL DAILY
Refills: 0 | Status: DISCONTINUED | OUTPATIENT
Start: 2021-12-29 | End: 2022-01-12

## 2021-12-29 RX ORDER — DEXTROSE 50 % IN WATER 50 %
12.5 SYRINGE (ML) INTRAVENOUS ONCE
Refills: 0 | Status: DISCONTINUED | OUTPATIENT
Start: 2021-12-29 | End: 2022-01-12

## 2021-12-29 RX ORDER — ASPIRIN/CALCIUM CARB/MAGNESIUM 324 MG
81 TABLET ORAL DAILY
Refills: 0 | Status: DISCONTINUED | OUTPATIENT
Start: 2021-12-29 | End: 2022-01-12

## 2021-12-29 RX ORDER — LEVOTHYROXINE SODIUM 125 MCG
100 TABLET ORAL DAILY
Refills: 0 | Status: DISCONTINUED | OUTPATIENT
Start: 2021-12-29 | End: 2022-01-12

## 2021-12-29 RX ORDER — SODIUM CHLORIDE 9 MG/ML
3 INJECTION INTRAMUSCULAR; INTRAVENOUS; SUBCUTANEOUS EVERY 8 HOURS
Refills: 0 | Status: DISCONTINUED | OUTPATIENT
Start: 2021-12-29 | End: 2022-01-12

## 2021-12-29 RX ORDER — INSULIN LISPRO 100/ML
VIAL (ML) SUBCUTANEOUS
Refills: 0 | Status: DISCONTINUED | OUTPATIENT
Start: 2021-12-29 | End: 2022-01-12

## 2021-12-29 RX ADMIN — SODIUM CHLORIDE 3 MILLILITER(S): 9 INJECTION INTRAMUSCULAR; INTRAVENOUS; SUBCUTANEOUS at 15:05

## 2021-12-29 NOTE — H&P CARDIOLOGY - HISTORY OF PRESENT ILLNESS
65M with PMH of HTN, T2DM, HLD, hypothyroidism, chronic pancreatitis, who on 3/12/2021 underwent a CABG x 3 (pre op IABP). Post op course complicated by PCA pump for pain management. Small amount of serous fluid from mid sternal incision. with dermabond applied now p/w c/o chest pain Seen & evaluated by & now for [facility].   65M with PMH of HTN, T2DM, HLD, hypothyroidism, chronic pancreatitis, who on 3/12/2021 underwent a CABG x 3 (pre op IABP). Post op course complicated by PCA pump for pain management. Small amount of serous fluid from mid sternal incision. with dermabond applied now p/w c/o chest pain had abnormal stress test Seen & evaluated by & now for [facility].   65M with PMH of HTN, T2DM, HLD, hypothyroidism, chronic pancreatitis, who on 3/12/2021 underwent a CABG x 3 (pre op IABP). Post op course complicated by PCA pump for pain management. Small amount of serous fluid from mid sternal incision. with dermabond applied now p/w c/o chest pain had abnormal stress test Seen & evaluated by Dr Hitesh rubio & now for [facility].

## 2021-12-29 NOTE — CHART NOTE - NSCHARTNOTEFT_GEN_A_CORE
Removal of Femoral Sheath:    Pulses in the right lower extremity were assessed prior to sheath pull and are palpable. The patient was placed in the supine position. The insertion site was identified and the sutures were removed per protocol. The femoral sheath was then removed. Direct pressure was applied for 30 minutes. Monitoring of the right groin and both lower extremities including neuro-vascular checks were done. Vital signs every 15 minutes x 4, then every 30 minutes x 2, then every 1 hour were ordered.    Complications: None    Comments: Site remains soft and non tender with no evidence of hematoma or bleeding. Femoral artery auscultated with no evidence of bruit. Instructions reviewed with patient. Will continue to monitor.

## 2021-12-29 NOTE — ASU DISCHARGE PLAN (ADULT/PEDIATRIC) - NS MD DC FALL RISK RISK
For information on Fall & Injury Prevention, visit: [email or web address] OR  [email or web address] OR  https://www.cdc.gov/steadi/patient.html

## 2021-12-29 NOTE — H&P CARDIOLOGY - NSICDXPASTSURGICALHX_GEN_ALL_CORE_FT
PAST SURGICAL HISTORY:  No significant past surgical history      PAST SURGICAL HISTORY:  History of cholecystectomy     History of pancreatic surgery     S/P CABG x 3

## 2021-12-29 NOTE — ASU PATIENT PROFILE, ADULT - FALL HARM RISK - UNIVERSAL INTERVENTIONS
Bed in lowest position, wheels locked, appropriate side rails in place/Call bell, personal items and telephone in reach/Instruct patient to call for assistance before getting out of bed or chair/Non-slip footwear when patient is out of bed/[place] to call system/Physically safe environment - no spills, clutter or unnecessary equipment/Purposeful Proactive Rounding/Room/bathroom lighting operational, light cord in reach

## 2021-12-29 NOTE — ASU DISCHARGE PLAN (ADULT/PEDIATRIC) - ASU DC SPECIAL INSTRUCTIONSFT

## 2021-12-29 NOTE — H&P CARDIOLOGY - TIME:
Patient is calling because last night she experience that it was hard to breath she did use her monitor for oxygen level and it show that they were at 97/51 at 11:30 pm and within a little more time after is was at 98/60. She wanted to inform  and wanted to know if she could have some advice. She states that this is not the first time this happens to her and that  is aware in the past     07:11

## 2021-12-29 NOTE — ASU DISCHARGE PLAN (ADULT/PEDIATRIC) - CARE PROVIDER_API CALL
Hitesh Fraga J  CARDIOVASCULAR DISEASE  [phone] [place] [address]  Phone: [phone]  Fax: [phone]  Established Patient  Follow Up Time: 2 weeks

## 2023-12-08 NOTE — PROGRESS NOTE ADULT - SUBJECTIVE AND OBJECTIVE BOX
Patient is a 65y old  Male who presents with a chief complaint of chest pain (17 Mar 2021 11:21)      SUBJECTIVE / OVERNIGHT EVENTS: Comfortable without new complaints.   Review of Systems  chest pain no  palpitations no  sob no  nausea no  headache no    MEDICATIONS  (STANDING):  acetaminophen  IVPB .. 1000 milliGRAM(s) IV Intermittent once  aspirin enteric coated 81 milliGRAM(s) Oral daily  atorvastatin 40 milliGRAM(s) Oral at bedtime  chlorhexidine 2% Cloths 1 Application(s) Topical <User Schedule>  famotidine    Tablet 20 milliGRAM(s) Oral daily  heparin   Injectable 5000 Unit(s) SubCutaneous every 8 hours  insulin glargine Injectable (LANTUS) 45 Unit(s) SubCutaneous at bedtime  insulin lispro (ADMELOG) corrective regimen sliding scale   SubCutaneous three times a day before meals  insulin lispro (ADMELOG) corrective regimen sliding scale   SubCutaneous at bedtime  insulin lispro Injectable (ADMELOG) 10 Unit(s) SubCutaneous three times a day before meals  latanoprost 0.005% Ophthalmic Solution 1 Drop(s) Both EYES at bedtime  levothyroxine 100 MICROGram(s) Oral daily  lisinopril 5 milliGRAM(s) Oral daily  metoprolol tartrate 75 milliGRAM(s) Oral two times a day  polyethylene glycol 3350 17 Gram(s) Oral daily    MEDICATIONS  (PRN):  BACItracin   Ointment 1 Application(s) Topical every 12 hours PRN Pain/itching  HYDROmorphone   Tablet 2 milliGRAM(s) Oral every 3 hours PRN Moderate Pain (4 - 6)  HYDROmorphone   Tablet 4 milliGRAM(s) Oral every 4 hours PRN Severe Pain (7 - 10)  naloxone Injectable 0.1 milliGRAM(s) IV Push every 3 minutes PRN For ANY of the following changes in patient status:  A. RR LESS THAN 10 breaths per minute, B. Oxygen saturation LESS THAN 90%, C. Sedation score of 6  ondansetron Injectable 4 milliGRAM(s) IV Push every 6 hours PRN Nausea      Vital Signs Last 24 Hrs  T(C): 37.2 (17 Mar 2021 11:18), Max: 37.3 (17 Mar 2021 07:42)  T(F): 98.9 (17 Mar 2021 11:18), Max: 99.2 (17 Mar 2021 07:42)  HR: 76 (17 Mar 2021 11:18) (76 - 89)  BP: 114/63 (17 Mar 2021 11:18) (108/56 - 133/65)  BP(mean): 81 (16 Mar 2021 20:16) (81 - 81)  RR: 17 (17 Mar 2021 11:18) (17 - 18)  SpO2: 96% (17 Mar 2021 11:18) (94% - 97%)    PHYSICAL EXAM:  GENERAL: NAD, well-developed  HEAD:  Atraumatic, Normocephalic  EYES: EOMI, PERRLA, conjunctiva and sclera clear  NECK: Supple, No JVD  CHEST/LUNG: Clear to auscultation bilaterally; No wheeze Sternal wound healing   HEART: Regular rate and rhythm; No murmurs, rubs, or gallops  ABDOMEN: Soft, Nontender, Nondistended; Bowel sounds present  EXTREMITIES:  2+ Peripheral Pulses, No clubbing, cyanosis, or edema  PSYCH: AAOx3  NEUROLOGY: non-focal  SKIN: No rashes or lesions    LABS:                        9.5    15.46 )-----------( 255      ( 17 Mar 2021 06:17 )             29.4     03-17    136  |  100  |  15  ----------------------------<  77  4.2   |  24  |  0.98    Ca    9.2      17 Mar 2021 06:11  Phos  1.6     03-16  Mg     2.5     03-16    TPro  6.0  /  Alb  3.3  /  TBili  0.8  /  DBili  x   /  AST  38  /  ALT  33  /  AlkPhos  82  03-16                RADIOLOGY & ADDITIONAL TESTS:    Imaging Personally Reviewed:    Consultant(s) Notes Reviewed:      Care Discussed with Consultants/Other Providers:   BMP/CBC/HCG/UCG/EKG

## 2024-02-05 NOTE — DIETITIAN INITIAL EVALUATION ADULT. - PROBLEM SELECTOR PLAN 5
----- Message from Ulises Rosen sent at 2/5/2024  7:36 AM EST -----  Regarding: Refill prescription needed  Contact: [phone]  Dr. Cortes:    Please provide a refill for Ketoconazole shampoo for Ulises Rosen.  Our pharmacy on file is the [facility] at [address] [place] [address].    Thank you.    Briana Rosen  [phone]   pt with chronic abd pain, lipase mildly elevated  c/t monitor  c/w pain control

## 2024-11-04 NOTE — ASU PATIENT PROFILE, ADULT - MEDICATION HERBAL REMEDIES, PROFILE
A (STENT EVERFLEX ENTRUST 8MM 40MM 120CM SELF EXPAND DLV SYS) self-expanding bare metal  stent was deployed in the left external iliac artery.   The stent was deployed at 11/4/2024 4:11 PM. no

## 2024-12-25 NOTE — DISCHARGE NOTE PROVIDER - CARE PROVIDERS DIRECT ADDRESSES
,[email or web address],DirectAddress_Unknown no chest pain, no cough, and no shortness of breath. ,[email or web address],DirectAddress_Unknown,[email or web address],DirectAddress_Unknown